# Patient Record
Sex: FEMALE | Race: WHITE | NOT HISPANIC OR LATINO | Employment: FULL TIME | URBAN - METROPOLITAN AREA
[De-identification: names, ages, dates, MRNs, and addresses within clinical notes are randomized per-mention and may not be internally consistent; named-entity substitution may affect disease eponyms.]

---

## 2017-01-22 NOTE — PROGRESS NOTES
HPI  Cinthya Waite is a 60-year-old woman with a history of stage IIA, T2 N0 M0, ER positive, IL positive and HER-2 negative invasive ductal carcinoma of the left breast diagnosed in 2007.   She is status post lumpectomy 08/05/2007 followed by 4 cycles of Taxotere and Cyclophosphamide. Patient completed radiation therapy in 01/2008. She initiated Tamoxifen and was on it for one year and when she became post-menopausal, she was changed to Arimidex in 08/2008There is a small seroma at the site of the lumpectomy just above the incision.  It is also possible there could be some fat necrosis there.  This area of firmness has been stable.  She has been followed by our clinic every 6 months and we will alternate followup with Michelle Cole.  She has been followed by yearly mammographic surveillance.         URI No gynecological symptoms.    INTERVAL HISTORY:  Cinthya Waite returns to clinic for routine followup.  She has been doing quite well.  She has no pain, fatigue, depression or anxiety.        REVIEW OF SYSTEMS:  She has a 10-point review of systems that is entirely negative.      She did have an allergy with some hives on her breasts about 2 months ago, but this was not accompanied by any upper respiratory or eye redness and may be food related.  This has not repeated, but I discussed with her that it may be food related and she could see an allergist if it recurs.  She is off hormonal therapy, having completed 7-1/2 years.  By her choice, she wanted to stop because she felt she was intolerant of aromatase inhibitors with joint discomfort and also had some fatigue on tamoxifen.      PHYSICAL EXAMINATION:   VITAL SIGNS:  Blood pressure 137/66, temperature 98.1, pulse 85, respirations 18, O2 sat 97% on room air, height 1.6 meters and weight 79.9 kg, which is up by a kilogram since over the past year.   GENERAL:  Cinthya appeared generally well.     HEENT:  She has no alopecia.  Examination of the oropharynx is  without lesions.   LYMPH:  There is no palpable cervical, supraclavicular, subclavicular or axillary lymphadenopathy.   BREASTS:  The right breast has an inverted nipple, no masses in the right breast.  The left breast has an inverted nipple, no masses in the left breast.  There is a left breast incision located about 5 fingerbreadths above the nipple-areolar complex at the 12 o'clock position, which is well-healed without erythema or masses.  There is some slight firmness above the incision, which may be related to a seroma which is, if anything, less than on previous visit.  The left nipple is inverted.  The left axillary incision is well-healed without erythema or masses.   LUNGS:  Clear to percussion and auscultation.   HEART:  There is a regular rate and rhythm, S1, S2.   ABDOMEN:  Soft and nontender, without hepatosplenomegaly.   EXTREMITIES:  Without edema.   PSYCHIATRIC:  Mood and affect were normal.      LABORATORY DATA:  The CBC and CMP were within normal limits except for a nonfasting glucose of 129.  DEXA scan is pending.  Mammogram from 08/25/2016 was BI-RADS 2, scattered fibroglandular densities and breast-conserving changes on the left.      ASSESSMENT AND PLAN:   1.  Cinthya Waite is a 61-year-old woman with a history of a stage IIA, T2 N0 M0, ER positive, SD positive and HER-2 negative invasive ductal carcinoma of the left breast diagnosed in 2007.  There is a small seroma at the site of the lumpectomy just above the incision.  There also could be some fat necrosis of the site.  This area of firmness has been stable; if anything, is a little bit decreased compared to previous visit.  Our plan is to continue every 6-month followup alternating with SOLANGE Saleem.  We will continue with surveillance screening mammography only.  She is due for a mammogram this August.  She did have a mammogram in 08/2016, which was BI-RADS 2.   2.  Hormonal therapy. Cinthya decided that she wanted to discontinue  hormonal therapy last February.  She had completed 7-1/2 years of hormonal therapy.  She was concerned about joint discomfort related to the aromatase inhibitors and did have some vaginal bleeding on tamoxifen, which resolved.  She did have some loss of energy on tamoxifen.  She decided that overall she felt better off tamoxifen and wanted to discontinue hormonal therapy.  Given that she had had 7-1/2 years, I think this is a reasonable personal decision on her part.   3.  Osteopenia.  DEXA scan is pending.  Last DEXA scan showed a most valid negative T-score of -1.5.  We will contact her with results.   4.  Continue with calcium and vitamin D.   5.  We do recommend 150 minutes of exercise a week and she has been exercising very vigorously.  We commended her on these efforts.   6.  Followup.  She will see Michelle Cole in followup in 6 months and me in a year.  Mammogram will be in 08/2017. Follow up with Michelle Cole July 25 with tomomammogram, CBC, CMP.         Thank you for allowing us to continue to participate in Cinthya Waite's care.   Sincerely,    Johnathan Herrera M.D.      Division of Hematology, Oncology, Transplant   Department of Medicine   [employer]   [phone]   ADDENDUM:  New diagnosis of osteoporosis.  We will call.  Prolia could be an option.     I spent 20 minutes with the patient more than 50% of which was in counseling and coordination of care.

## 2017-01-24 ENCOUNTER — ONCOLOGY VISIT (OUTPATIENT)
Dept: ONCOLOGY | Facility: CLINIC | Age: 62
End: 2017-01-24
Attending: INTERNAL MEDICINE
Payer: COMMERCIAL

## 2017-01-24 ENCOUNTER — APPOINTMENT (OUTPATIENT)
Dept: LAB | Facility: CLINIC | Age: 62
End: 2017-01-24
Attending: INTERNAL MEDICINE
Payer: COMMERCIAL

## 2017-01-24 ENCOUNTER — RECORDS - HEALTHEAST (OUTPATIENT)
Dept: ADMINISTRATIVE | Facility: OTHER | Age: 62
End: 2017-01-24

## 2017-01-24 VITALS
OXYGEN SATURATION: 97 % | DIASTOLIC BLOOD PRESSURE: 66 MMHG | HEIGHT: 64 IN | WEIGHT: 176.15 LBS | RESPIRATION RATE: 18 BRPM | SYSTOLIC BLOOD PRESSURE: 137 MMHG | HEART RATE: 85 BPM | BODY MASS INDEX: 30.07 KG/M2 | TEMPERATURE: 98.1 F

## 2017-01-24 DIAGNOSIS — C50.912 INFILTRATING DUCTAL CARCINOMA OF BREAST, LEFT (H): ICD-10-CM

## 2017-01-24 LAB
ALBUMIN SERPL-MCNC: 3.5 G/DL (ref 3.4–5)
ALP SERPL-CCNC: 83 U/L (ref 40–150)
ALT SERPL W P-5'-P-CCNC: 23 U/L (ref 0–50)
ANION GAP SERPL CALCULATED.3IONS-SCNC: 6 MMOL/L (ref 3–14)
AST SERPL W P-5'-P-CCNC: 16 U/L (ref 0–45)
BASOPHILS # BLD AUTO: 0.1 10E9/L (ref 0–0.2)
BASOPHILS NFR BLD AUTO: 0.7 %
BILIRUB SERPL-MCNC: 1 MG/DL (ref 0.2–1.3)
BUN SERPL-MCNC: 9 MG/DL (ref 7–30)
CALCIUM SERPL-MCNC: 8.7 MG/DL (ref 8.5–10.1)
CHLORIDE SERPL-SCNC: 108 MMOL/L (ref 94–109)
CO2 SERPL-SCNC: 28 MMOL/L (ref 20–32)
CREAT SERPL-MCNC: 0.82 MG/DL (ref 0.52–1.04)
DIFFERENTIAL METHOD BLD: NORMAL
EOSINOPHIL # BLD AUTO: 0.3 10E9/L (ref 0–0.7)
EOSINOPHIL NFR BLD AUTO: 4 %
ERYTHROCYTE [DISTWIDTH] IN BLOOD BY AUTOMATED COUNT: 12.5 % (ref 10–15)
GFR SERPL CREATININE-BSD FRML MDRD: 70 ML/MIN/1.7M2
GLUCOSE SERPL-MCNC: 129 MG/DL (ref 70–99)
HCT VFR BLD AUTO: 41.8 % (ref 35–47)
HGB BLD-MCNC: 14.5 G/DL (ref 11.7–15.7)
IMM GRANULOCYTES # BLD: 0 10E9/L (ref 0–0.4)
IMM GRANULOCYTES NFR BLD: 0.2 %
LYMPHOCYTES # BLD AUTO: 1 10E9/L (ref 0.8–5.3)
LYMPHOCYTES NFR BLD AUTO: 12.9 %
MCH RBC QN AUTO: 31.7 PG (ref 26.5–33)
MCHC RBC AUTO-ENTMCNC: 34.7 G/DL (ref 31.5–36.5)
MCV RBC AUTO: 91 FL (ref 78–100)
MONOCYTES # BLD AUTO: 0.6 10E9/L (ref 0–1.3)
MONOCYTES NFR BLD AUTO: 7.8 %
NEUTROPHILS # BLD AUTO: 6 10E9/L (ref 1.6–8.3)
NEUTROPHILS NFR BLD AUTO: 74.4 %
NRBC # BLD AUTO: 0 10*3/UL
NRBC BLD AUTO-RTO: 0 /100
PLATELET # BLD AUTO: 191 10E9/L (ref 150–450)
POTASSIUM SERPL-SCNC: 3.4 MMOL/L (ref 3.4–5.3)
PROT SERPL-MCNC: 6.6 G/DL (ref 6.8–8.8)
RBC # BLD AUTO: 4.58 10E12/L (ref 3.8–5.2)
SODIUM SERPL-SCNC: 141 MMOL/L (ref 133–144)
WBC # BLD AUTO: 8 10E9/L (ref 4–11)

## 2017-01-24 PROCEDURE — 80053 COMPREHEN METABOLIC PANEL: CPT | Performed by: PHYSICIAN ASSISTANT

## 2017-01-24 PROCEDURE — 99212 OFFICE O/P EST SF 10 MIN: CPT

## 2017-01-24 PROCEDURE — 36415 COLL VENOUS BLD VENIPUNCTURE: CPT

## 2017-01-24 PROCEDURE — 99213 OFFICE O/P EST LOW 20 MIN: CPT | Mod: ZP | Performed by: INTERNAL MEDICINE

## 2017-01-24 PROCEDURE — 85025 COMPLETE CBC W/AUTO DIFF WBC: CPT | Performed by: PHYSICIAN ASSISTANT

## 2017-01-24 ASSESSMENT — ENCOUNTER SYMPTOMS
POOR WOUND HEALING: 0
SKIN CHANGES: 0
NAIL CHANGES: 0

## 2017-01-24 ASSESSMENT — PAIN SCALES - GENERAL: PAINLEVEL: NO PAIN (0)

## 2017-01-24 NOTE — Clinical Note
1/24/2017       RE: Cinthya Waite  [address]  [place] [address]     Dear Colleague,    Thank you for referring your patient, Cinthya Waite, to the [facility]. Please see a copy of my visit note below.    HPI  Cinthya Waite is a 60-year-old woman with a history of stage IIA, T2 N0 M0, ER positive, [place] positive and HER-2 negative invasive ductal carcinoma of the left breast diagnosed in 2007.   She is status post lumpectomy 08/05/2007 followed by 4 cycles of Taxotere and Cyclophosphamide. Patient completed radiation therapy in 01/2008. She initiated Tamoxifen and was on it for one year and when she became post-menopausal, she was changed to Arimidex in 08/2008There is a small seroma at the site of the lumpectomy just above the incision.  It is also possible there could be some fat necrosis there.  This area of firmness has been stable.  She has been followed by our clinic every 6 months and we will alternate followup with Michelle Cole.  She has been followed by yearly mammographic surveillance.         URI No gynecological symptoms.    INTERVAL HISTORY:  Cinthya Waite returns to clinic for routine followup.  She has been doing quite well.  She has no pain, fatigue, depression or anxiety.        REVIEW OF SYSTEMS:  She has a 10-point review of systems that is entirely negative.      She did have an allergy with some hives on her breasts about 2 months ago, but this was not accompanied by any upper respiratory or eye redness and may be food related.  This has not repeated, but I discussed with her that it may be food related and she could see an allergist if it recurs.  She is off hormonal therapy, having completed 7-1/2 years.  By her choice, she wanted to stop because she felt she was intolerant of aromatase inhibitors with joint discomfort and also had some fatigue on tamoxifen.      PHYSICAL EXAMINATION:   VITAL SIGNS:  Blood pressure 137/66, temperature  98.1, pulse 85, respirations 18, O2 sat 97% on room air, height 1.6 meters and weight 79.9 kg, which is up by a kilogram since over the past year.   GENERAL:  Cinthya appeared generally well.     HEENT:  She has no alopecia.  Examination of the oropharynx is without lesions.   LYMPH:  There is no palpable cervical, supraclavicular, subclavicular or axillary lymphadenopathy.   BREASTS:  The right breast has an inverted nipple, no masses in the right breast.  The left breast has an inverted nipple, no masses in the left breast.  There is a left breast incision located about 5 fingerbreadths above the nipple-areolar complex at the 12 o'clock position, which is well-healed without erythema or masses.  There is some slight firmness above the incision, which may be related to a seroma which is, if anything, less than on previous visit.  The left nipple is inverted.  The left axillary incision is well-healed without erythema or masses.   LUNGS:  Clear to percussion and auscultation.   HEART:  There is a regular rate and rhythm, S1, S2.   ABDOMEN:  Soft and nontender, without hepatosplenomegaly.   EXTREMITIES:  Without edema.   PSYCHIATRIC:  Mood and affect were normal.      LABORATORY DATA:  The CBC and CMP were within normal limits except for a nonfasting glucose of 129.  DEXA scan is pending.  Mammogram from 08/25/2016 was BI-RADS 2, scattered fibroglandular densities and breast-conserving changes on the left.      ASSESSMENT AND PLAN:   1.  Cinthya Waite is a 61-year-old woman with a history of a stage IIA, T2 N0 M0, ER positive, [place] positive and HER-2 negative invasive ductal carcinoma of the left breast diagnosed in 2007.  There is a small seroma at the site of the lumpectomy just above the incision.  There also could be some fat necrosis of the site.  This area of firmness has been stable; if anything, is a little bit decreased compared to previous visit.  Our plan is to continue every 6-month followup alternating  with SOLANGE Saleem.  We will continue with surveillance screening mammography only.  She is due for a mammogram this August.  She did have a mammogram in 08/2016, which was BI-RADS 2.   2.  Hormonal therapy. Cinthya decided that she wanted to discontinue hormonal therapy last February.  She had completed 7-1/2 years of hormonal therapy.  She was concerned about joint discomfort related to the aromatase inhibitors and did have some vaginal bleeding on tamoxifen, which resolved.  She did have some loss of energy on tamoxifen.  She decided that overall she felt better off tamoxifen and wanted to discontinue hormonal therapy.  Given that she had had 7-1/2 years, I think this is a reasonable personal decision on her part.   3.  Osteopenia.  DEXA scan is pending.  Last DEXA scan showed a most valid negative T-score of -1.5.  We will contact her with results.   4.  Continue with calcium and vitamin D.   5.  We do recommend 150 minutes of exercise a week and she has been exercising very vigorously.  We commended her on these efforts.   6.  Followup.  She will see Michelle Cole in followup in 6 months and me in a year.  Mammogram will be in 08/2017. Follow up with Michelle Cole July 25 with tomomammogram, CBC, CMP.         Thank you for allowing us to continue to participate in Cinthya Waite's care.   Sincerely,    Johnathan Herrera M.D.      Division of Hematology, Oncology, Transplant   Department of Medicine   [employer] [facility] [employer]   [phone]   ADDENDUM:  New diagnosis of osteoporosis.  We will call.  Prolia could be an option.     I spent 20 minutes with the patient more than 50% of which was in counseling and coordination of care.     Again, thank you for allowing me to participate in the care of your patient.      Sincerely,    Johnathan Herrera MD

## 2017-01-24 NOTE — NURSING NOTE
"Cinthya Waite is a 61 year old female who presents for:  Chief Complaint   Patient presents with     Blood Draw     labs drawn from right arm and vitals taken by [facility]      Oncology Clinic Visit     Patient is seeing provider for post op        Initial Vitals:  /66 mmHg  Pulse 85  Temp(Src) 98.1  F (36.7  C) (Oral)  Resp 18  Ht 1.626 m (5' 4\")  Wt 79.9 kg (176 lb 2.4 oz)  BMI 30.22 kg/m2  SpO2 97%  LMP 06/07/2007 Estimated body mass index is 30.22 kg/(m^2) as calculated from the following:    Height as of this encounter: 1.626 m (5' 4\").    Weight as of this encounter: 79.9 kg (176 lb 2.4 oz).. Body surface area is 1.90 meters squared. BP completed using cuff size: NA (Not Taken)  No Pain (0) Patient's last menstrual period was 06/07/2007. Allergies and medications reviewed.     Medications: Medication refills not needed today.  Pharmacy name entered into [employer]:    [id number] [place], [place] - [address] [facility] - [place], [place] - [address]    Comments: Patient is not in any pain today. Vitals taken in lab today    6 minutes for nursing intake (face to face time)   Megha Mcpherson LPN        "

## 2017-01-24 NOTE — NURSING NOTE
Chief Complaint   Patient presents with     Blood Draw     labs drawn from right arm and vitals taken by [facility]      Regular size cuff was used for blood pressure check.   Carmen Rosario CMA January 24, 2017

## 2017-02-16 ENCOUNTER — TELEPHONE (OUTPATIENT)
Dept: ONCOLOGY | Facility: CLINIC | Age: 62
End: 2017-02-16

## 2017-02-16 NOTE — TELEPHONE ENCOUNTER
Patient called to discuss DEXA scan result and Dr Herrera's recommendation to start denosumab. Patient requesting result to be released to [facility] and was done as requested to take to patient's PMD to discuss and manage. Will send message to Dr Herrera for update of patient's request.  Answered all patient's questions and verbalized understanding. Kaleigh Mccray RN, BSN.

## 2017-02-19 ENCOUNTER — COMMUNICATION - HEALTHEAST (OUTPATIENT)
Dept: INTERNAL MEDICINE | Facility: CLINIC | Age: 62
End: 2017-02-19

## 2017-02-19 DIAGNOSIS — E03.9 UNSPECIFIED HYPOTHYROIDISM: ICD-10-CM

## 2017-02-24 ENCOUNTER — COMMUNICATION - HEALTHEAST (OUTPATIENT)
Dept: INTERNAL MEDICINE | Facility: CLINIC | Age: 62
End: 2017-02-24

## 2017-02-24 DIAGNOSIS — I10 UNSPECIFIED ESSENTIAL HYPERTENSION: ICD-10-CM

## 2017-03-14 ENCOUNTER — OFFICE VISIT - HEALTHEAST (OUTPATIENT)
Dept: INTERNAL MEDICINE | Facility: CLINIC | Age: 62
End: 2017-03-14

## 2017-03-14 ENCOUNTER — AMBULATORY - HEALTHEAST (OUTPATIENT)
Dept: INTERNAL MEDICINE | Facility: CLINIC | Age: 62
End: 2017-03-14

## 2017-03-14 ENCOUNTER — COMMUNICATION - HEALTHEAST (OUTPATIENT)
Dept: INTERNAL MEDICINE | Facility: CLINIC | Age: 62
End: 2017-03-14

## 2017-03-14 DIAGNOSIS — Z51.81 MEDICATION MONITORING ENCOUNTER: ICD-10-CM

## 2017-03-14 DIAGNOSIS — M81.8 OSTEOPOROSIS DUE TO AROMATASE INHIBITOR: ICD-10-CM

## 2017-03-14 DIAGNOSIS — C50.919 BREAST CANCER (H): ICD-10-CM

## 2017-03-14 DIAGNOSIS — E78.00 HYPERCHOLESTEROLEMIA: ICD-10-CM

## 2017-03-14 DIAGNOSIS — E03.9 ACQUIRED HYPOTHYROIDISM: ICD-10-CM

## 2017-03-14 DIAGNOSIS — T38.6X5A OSTEOPOROSIS DUE TO AROMATASE INHIBITOR: ICD-10-CM

## 2017-03-14 DIAGNOSIS — Z00.00 PREVENTATIVE HEALTH CARE: ICD-10-CM

## 2017-03-14 DIAGNOSIS — I10 ESSENTIAL HYPERTENSION WITH GOAL BLOOD PRESSURE LESS THAN 140/90: ICD-10-CM

## 2017-03-14 DIAGNOSIS — M81.0 OSTEOPOROSIS: ICD-10-CM

## 2017-03-14 DIAGNOSIS — E03.9 UNSPECIFIED HYPOTHYROIDISM: ICD-10-CM

## 2017-03-14 LAB
CHOLEST SERPL-MCNC: 233 MG/DL
FASTING STATUS PATIENT QL REPORTED: YES
HDLC SERPL-MCNC: 54 MG/DL
LDLC SERPL CALC-MCNC: 142 MG/DL
TRIGL SERPL-MCNC: 187 MG/DL

## 2017-03-14 ASSESSMENT — MIFFLIN-ST. JEOR: SCORE: 1318.9

## 2017-03-15 ENCOUNTER — COMMUNICATION - HEALTHEAST (OUTPATIENT)
Dept: INTERNAL MEDICINE | Facility: CLINIC | Age: 62
End: 2017-03-15

## 2017-03-16 ENCOUNTER — COMMUNICATION - HEALTHEAST (OUTPATIENT)
Dept: INTERNAL MEDICINE | Facility: CLINIC | Age: 62
End: 2017-03-16

## 2017-03-17 LAB
HPV INTERPRETATION - HISTORICAL: NORMAL
HPV INTERPRETER - HISTORICAL: NORMAL

## 2017-03-20 ENCOUNTER — AMBULATORY - HEALTHEAST (OUTPATIENT)
Dept: INTERNAL MEDICINE | Facility: CLINIC | Age: 62
End: 2017-03-20

## 2017-03-20 ENCOUNTER — COMMUNICATION - HEALTHEAST (OUTPATIENT)
Dept: INTERNAL MEDICINE | Facility: CLINIC | Age: 62
End: 2017-03-20

## 2017-03-20 ENCOUNTER — AMBULATORY - HEALTHEAST (OUTPATIENT)
Dept: LAB | Facility: CLINIC | Age: 62
End: 2017-03-20

## 2017-03-20 DIAGNOSIS — M81.0 OSTEOPOROSIS: ICD-10-CM

## 2017-03-20 DIAGNOSIS — E21.3 HYPERPARATHYROIDISM (H): ICD-10-CM

## 2017-03-22 LAB
BKR LAB AP ABNORMAL BLEEDING: NO
BKR LAB AP BIRTH CONTROL/HORMONES: NORMAL
BKR LAB AP CERVICAL APPEARANCE: NORMAL
BKR LAB AP GYN ADEQUACY: NORMAL
BKR LAB AP GYN INTERPRETATION: NORMAL
BKR LAB AP HPV REFLEX: NORMAL
BKR LAB AP LMP: NORMAL
BKR LAB AP PATIENT STATUS: NORMAL
BKR LAB AP PREVIOUS ABNORMAL: NORMAL
BKR LAB AP PREVIOUS NORMAL: 2014
HIGH RISK?: NO
PATH REPORT.COMMENTS IMP SPEC: NORMAL
RESULT FLAG (HE HISTORICAL CONVERSION): NORMAL

## 2017-03-23 ENCOUNTER — COMMUNICATION - HEALTHEAST (OUTPATIENT)
Dept: INTERNAL MEDICINE | Facility: CLINIC | Age: 62
End: 2017-03-23

## 2017-04-04 ENCOUNTER — TELEPHONE (OUTPATIENT)
Dept: ONCOLOGY | Facility: CLINIC | Age: 62
End: 2017-04-04

## 2017-04-04 NOTE — TELEPHONE ENCOUNTER
Patient called in reporting new swollen lymph under left armpit noticed a few days ago. Pt describes it as about a pea size with some bruising surrounding the area. No redness, warmth or tenderness noted to the area. Pt has had no noted fevers or recent illnesses. No swollen lymphs noted elsewhere. Per Kaleigh Mccray RN pt to see PA in clinic for follow up. Pt scheduled to see SOLANGE Saleem on 4/11/17.

## 2017-04-06 ENCOUNTER — ONCOLOGY VISIT (OUTPATIENT)
Dept: ONCOLOGY | Facility: CLINIC | Age: 62
End: 2017-04-06
Attending: PHYSICIAN ASSISTANT
Payer: COMMERCIAL

## 2017-04-06 VITALS
BODY MASS INDEX: 30.4 KG/M2 | WEIGHT: 177.1 LBS | OXYGEN SATURATION: 98 % | RESPIRATION RATE: 16 BRPM | DIASTOLIC BLOOD PRESSURE: 80 MMHG | SYSTOLIC BLOOD PRESSURE: 131 MMHG | TEMPERATURE: 97.6 F | HEART RATE: 92 BPM

## 2017-04-06 DIAGNOSIS — C50.912 INFILTRATING DUCTAL CARCINOMA OF BREAST, LEFT (H): Primary | ICD-10-CM

## 2017-04-06 DIAGNOSIS — R59.1 LYMPHADENOPATHY: ICD-10-CM

## 2017-04-06 DIAGNOSIS — R22.30 AXILLARY FULLNESS: ICD-10-CM

## 2017-04-06 DIAGNOSIS — R91.1 PULMONARY NODULE: ICD-10-CM

## 2017-04-06 PROCEDURE — 99214 OFFICE O/P EST MOD 30 MIN: CPT | Mod: ZP | Performed by: PHYSICIAN ASSISTANT

## 2017-04-06 PROCEDURE — 99212 OFFICE O/P EST SF 10 MIN: CPT

## 2017-04-06 ASSESSMENT — PAIN SCALES - GENERAL: PAINLEVEL: NO PAIN (0)

## 2017-04-06 NOTE — PROGRESS NOTES
DIAGNOSIS:  Stage IIB (T2N0M0), ER positive, MI positive, HER-2/[place] negative, left breast carcinoma. Status post lumpectomy on 08/13/2007 followed by 4 cycles of Taxotere and Cyclophosphamide. Patient completed radiation therapy with all treatment completed 01/2008. She was initiated on Tamoxifen but then when she became post-menopausal, changed to Arimidex in 10/2008 and took until 08/2013. She was restarted on the Tamoxifen in 08/2013 with a plan to take for 4 years to complete a total of 10 years of a hormonal therapy. She stopped the Tamoxifen in 09/2015 with an episode of vaginal bleeding. She had a D&C in October 2015 with benign pathology. Decision was made to not restart the Tamoxifen.    INTERVAL HISTORY:  Ms. Waite comes into the clinic today for an unscheduled visit.  About a week ago, she noted a swollen area in her left armpit region.  She states that it felt achy with any kind of movement, such as raising her arm.  She denies any trauma or injury to the area.  She denies any change in her breast exam.  She is planning on seeing an endocrinologist on Monday for apparent hyperparathyroidism.  She is otherwise eating and drinking okay.  She denies any chest pain, shortness of breath, cough, nasal congestion, nausea, vomiting, abdominal pain, new bone pains or change in her headaches.     MEDICATIONS:   Current Outpatient Prescriptions   Medication Sig Dispense Refill     aspirin 81 MG tablet Take 81 mg by mouth daily       VITAMIN D, CHOLECALCIFEROL, PO Take 5,000 Units by mouth daily       buPROPion (BUPROBAN) 150 MG 12 hr tablet Take 150 mg by mouth 2 times daily.        busPIRone (BUSPAR) 10 MG tablet Take 10 mg by mouth daily.       atorvastatin (LIPITOR) 40 MG tablet Take 40 mg by mouth daily.        hydrochlorothiazide (HYDRODIURIL) 25 MG tablet Take 1 tablet by mouth daily.       levothyroxine (SYNTHROID, LEVOTHROID) 125 MCG tablet 165 mcg daily            ALLERGIES:    Allergies   Allergen  Reactions     Diclofenac      Elevation of LFTs.       PHYSICAL EXAMINATION:  Vitals: /80  Pulse 92  Temp 97.6  F (36.4  C) (Oral)  Resp 16  Wt 80.3 kg (177 lb 1.6 oz)  LMP 06/07/2007  SpO2 98%  BMI 30.4 kg/m2  GENERAL:  A pleasant person in no acute distress.   HEENT:  Sclerae are nonicteric.     NECK:  Supple.   LYMPH NODES:  1 cm left supraclavicular lymph node.  No peripheral lymphadenopathy noted in the axillary, right supraclavicular, or cervical regions.   LUNGS:  Clear to auscultation bilaterally.   HEART:  Regular rate and rhythm, with no murmur appreciated.   ABDOMEN:  Bowel sounds are active.  Soft and nontender.  No hepatosplenomegaly or other masses appreciated.  LOWER EXTREMITIES:  Without pitting edema to the knees bilaterally.   NEUROLOGICAL:  Alert/orientated/able to answer all questions.  CN grossly intact.  BREAST: Bilateral nipple inversion. stable. Right breast, no dominant masses. Left breast notable for well healed surgical incision in upper inner quadrant. There is a palpable firmness in the area of the incision (known seroma) about 2 cm. No other masses.  Left axillary fullness, no adenopathy.  Proximal LUE lymphedema.       IMPRESSION/PLAN:   1.  Stage IIB (T2 N0 M0) left breast cancer, ER/MI-positive and HER2/[place]-negative.  Ms. Waite came into the clinic today for an unscheduled visit secondary to left axillary edema.  I am unable to palpate any axillary adenopathy, but she does have fullness in the area, and I question whether this could be lymphedema.  On exam, she does have a 1 cm left supraclavicular lymph node that is palpable.  No current upper respiratory infection symptoms.  We will plan further evaluation with a CT scan of the chest and neck.  Ms. Waite is in agreement with this plan, and we will obtain the CT scans as soon as possible and contact her with those results.  We will plan to keep her already scheduled appointment, and she will see Dr. Herrera with  her bilateral mammogram in August.   2.  Left axillary fullness/left supraclavicular lymph node.  Please see above.  We will plan further evaluation with a CT scan of the neck and chest.  We will plan to contact the patient with those results.      If there are no interval concerns, the patient will follow up in August.

## 2017-04-06 NOTE — NURSING NOTE
"Cinthya Waite is a 61 year old female who presents for:  Chief Complaint   Patient presents with     Oncology Clinic Visit     Breast Cancer        Initial Vitals:  /80  Pulse 92  Temp 97.6  F (36.4  C) (Oral)  Resp 16  Wt 80.3 kg (177 lb 1.6 oz)  LMP 06/07/2007  SpO2 98%  BMI 30.4 kg/m2 Estimated body mass index is 30.4 kg/(m^2) as calculated from the following:    Height as of 1/24/17: 1.626 m (5' 4\").    Weight as of this encounter: 80.3 kg (177 lb 1.6 oz).. Body surface area is 1.9 meters squared. BP completed using cuff size: regular  No Pain (0) Patient's last menstrual period was 06/07/2007. Allergies and medications reviewed.     Medications: Medication refills not needed today.  Pharmacy name entered into [employer]:    [id number] [place], [place] - [address] [facility] - [place], [place] - [address]    Comments: Patient is not having any pain today.     6 minutes for nursing intake (face to face time)   Delilah Mcgowan CMA       "

## 2017-04-06 NOTE — MR AVS SNAPSHOT
After Visit Summary   4/6/2017    Cinthya Waite    MRN: [id number]           Patient Information     Date Of Birth          1955        Visit Information        Provider Department      4/6/2017 9:30 AM Michelle Cole PA-C [facility]        Today's Diagnoses     Infiltrating ductal carcinoma of breast, left (H)    -  1    Lymphadenopathy        Axillary fullness           Follow-ups after your visit        Your next 10 appointments already scheduled     Apr 06, 2017 12:40 PM CDT   (Arrive by 12:25 PM)   CT SOFT TISSUE NECK W CONTRAST with UCCT1   [facility] [place] [facility] ([facility])    [address]  1st Floor  [place] [facility] [address] [phone]           Please bring any scans or X-rays taken at other hospitals, if similar tests were done. Also bring a list of your medicines, including vitamins, minerals and over-the-counter drugs. It is safest to leave personal items at home.  Be sure to tell your doctor:   If you have any allergies.   If there s any chance you are pregnant.   If you are breastfeeding.   If you have any special needs.  You will have contrast for this exam. To prepare:   Do not eat or drink for 2 hours before your exam. If you need to take medicine, you may take it with small sips of water. (We may ask you to take liquid medicine as well.)   The day before your exam, drink extra fluids at least six 8-ounce glasses (unless your doctor tells you to restrict your fluids).  Patients over 70 or patients with diabetes or kidney problems:   If you haven t had a blood test (creatinine test) within the last 30 days, go to your clinic or Diagnostic Imaging Department for this test.  If you have diabetes:   If your kidney function is normal, continue taking your metformin (Avandamet, Glucophage, Glucovance, Metaglip) on the day of your exam.   If your kidney function is abnormal, wait 48 hours before restarting this  medicine.  Please wear loose clothing, such as a sweat suit or jogging clothes. Avoid snaps, zippers and other metal. We may ask you to undress and put on a hospital gown.  If you have any questions, please call the Imaging Department where you will have your exam.            Apr 06, 2017  1:00 PM CDT   (Arrive by 12:45 PM)   CT CHEST W CONTRAST with UCCT1   [facility] CT ([facility] [place])    [address]  1st Floor  [place] [facility] [address] [phone]           Please bring any scans or X-rays taken at other hospitals, if similar tests were done. Also bring a list of your medicines, including vitamins, minerals and over-the-counter drugs. It is safest to leave personal items at home.  Be sure to tell your doctor:   If you have any allergies.   If there s any chance you are pregnant.   If you are breastfeeding.   If you have any special needs.  You will have contrast for this exam. To prepare:   Do not eat or drink for 2 hours before your exam. If you need to take medicine, you may take it with small sips of water. (We may ask you to take liquid medicine as well.)   The day before your exam, drink extra fluids at least six 8-ounce glasses (unless your doctor tells you to restrict your fluids).  Patients over 70 or patients with diabetes or kidney problems:   If you haven t had a blood test (creatinine test) within the last 30 days, go to your clinic or Diagnostic Imaging Department for this test.  If you have diabetes:   If your kidney function is normal, continue taking your metformin (Avandamet, Glucophage, Glucovance, Metaglip) on the day of your exam.   If your kidney function is abnormal, wait 48 hours before restarting this medicine.  Please wear loose clothing, such as a sweat suit or jogging clothes. Avoid snaps, zippers and other metal. We may ask you to undress and put on a hospital gown.  If you have any questions, please call the Imaging Department where you  will have your exam.            Aug 01, 2017 10:00 AM CDT   [facility] Lab Draw with  [facility] LAB DRAW   [facility] ([place] [facility])    [address] [place] [address] [phone]            Aug 01, 2017 10:30 AM CDT   (Arrive by 10:15 AM)   MA DIAGNOSTIC DIGITAL BILATERAL with [email or web address]   [facility] [place] [facility] ([place] [facility])    [address] [place] [address] [phone]           Do not use any powder, lotion or deodorant under your arms or on your breast. If you do, we will ask you to remove it before your exam.  Wear comfortable, two-piece clothing.  If you have any allergies, tell your care team.  Bring any previous mammograms from other facilities or have them mailed to the breast center.            Aug 01, 2017 11:00 AM CDT   (Arrive by 10:45 AM)   Return Visit with Michelle Cole PA-C   [facility] ([place] [facility])    [address] [place] [address] [phone]              Future tests that were ordered for you today     Open Future Orders        Priority Expected Expires Ordered    CT Soft tissue neck w contrast* ASAP  4/6/2018 4/6/2017    CT Chest w contrast ASAP  4/6/2018 4/6/2017            Who to contact     If you have questions or need follow up information about today's clinic visit or your schedule please contact [facility] directly at [phone].  Normal or non-critical lab and imaging results will be communicated to you by MyChart, letter or phone within 4 business days after the clinic has received the results. If you do not hear from us within 7 days, please contact the clinic through MyChart or phone. If you have a critical or abnormal lab result, we will notify you by phone as soon as possible.  Submit refill requests through [employer] or call your pharmacy and they will forward  the refill request to us. Please allow 3 business days for your refill to be completed.          Additional Information About Your Visit        [employer] Information     [employer] gives you secure access to your electronic health record. If you see a primary care provider, you can also send messages to your care team and make appointments. If you have questions, please call your primary care clinic.  If you do not have a primary care provider, please call [phone] and they will assist you.        Care EveryWhere ID     This is your Care EveryWhere ID. This could be used by other organizations to access your [place] medical records  [id number]        Your Vitals Were     Pulse Temperature Respirations Last Period Pulse Oximetry BMI (Body Mass Index)    92 97.6  F (36.4  C) (Oral) 16 06/07/2007 98% 30.4 kg/m2       Blood Pressure from Last 3 Encounters:   04/06/17 131/80   01/24/17 137/66   08/25/16 126/78    Weight from Last 3 Encounters:   04/06/17 80.3 kg (177 lb 1.6 oz)   01/24/17 79.9 kg (176 lb 2.4 oz)   08/25/16 78.3 kg (172 lb 11.2 oz)               Primary Care Provider Office Phone # Fax #    Heidi Valencia [phone]       [place] [id number]        Thank you!     Thank you for choosing [facility]  for your care. Our goal is always to provide you with excellent care. Hearing back from our patients is one way we can continue to improve our services. Please take a few minutes to complete the written survey that you may receive in the mail after your visit with us. Thank you!             Your Updated Medication List - Protect others around you: Learn how to safely use, store and throw away your medicines at www.disposemymeds.org.          This list is accurate as of: 4/6/17 10:35 AM.  Always use your most recent med list.                   Brand Name Dispense Instructions for use    aspirin 81 MG tablet      Take 81 mg by  mouth daily       BUPROBAN 150 MG 12 hr tablet   Generic drug:  buPROPion      Take 150 mg by mouth 2 times daily.       BUSPAR 10 MG tablet   Generic drug:  busPIRone      Take 10 mg by mouth daily.       hydrochlorothiazide 25 MG tablet    HYDRODIURIL     Take 1 tablet by mouth daily.       levothyroxine 125 MCG tablet    SYNTHROID/LEVOTHROID     165 mcg daily       LIPITOR 40 MG tablet   Generic drug:  atorvastatin      Take 40 mg by mouth daily.       VITAMIN D (CHOLECALCIFEROL) PO      Take 5,000 Units by mouth daily

## 2017-04-10 ENCOUNTER — HOSPITAL ENCOUNTER (OUTPATIENT)
Dept: ULTRASOUND IMAGING | Facility: CLINIC | Age: 62
Discharge: HOME | End: 2017-04-10

## 2017-04-10 ENCOUNTER — RECORDS - HEALTHEAST (OUTPATIENT)
Dept: ADMINISTRATIVE | Facility: OTHER | Age: 62
End: 2017-04-10

## 2017-04-10 DIAGNOSIS — E03.9 HYPOTHYROID: ICD-10-CM

## 2017-04-10 DIAGNOSIS — E21.0 PRIMARY HYPERPARATHYROIDISM (H): ICD-10-CM

## 2017-04-11 ENCOUNTER — COMMUNICATION - HEALTHEAST (OUTPATIENT)
Dept: INTERNAL MEDICINE | Facility: CLINIC | Age: 62
End: 2017-04-11

## 2017-04-11 ENCOUNTER — AMBULATORY - HEALTHEAST (OUTPATIENT)
Dept: LAB | Facility: CLINIC | Age: 62
End: 2017-04-11

## 2017-04-11 DIAGNOSIS — E21.0 HYPERPARATHYROIDISM, PRIMARY (H): ICD-10-CM

## 2017-04-12 ENCOUNTER — AMBULATORY - HEALTHEAST (OUTPATIENT)
Dept: LAB | Facility: CLINIC | Age: 62
End: 2017-04-12

## 2017-04-12 DIAGNOSIS — E21.0 HYPERPARATHYROIDISM, PRIMARY (H): ICD-10-CM

## 2017-04-14 ENCOUNTER — COMMUNICATION - HEALTHEAST (OUTPATIENT)
Dept: INTERNAL MEDICINE | Facility: CLINIC | Age: 62
End: 2017-04-14

## 2017-05-05 ENCOUNTER — COMMUNICATION - HEALTHEAST (OUTPATIENT)
Dept: INTERNAL MEDICINE | Facility: CLINIC | Age: 62
End: 2017-05-05

## 2017-05-25 ENCOUNTER — COMMUNICATION - HEALTHEAST (OUTPATIENT)
Dept: INTERNAL MEDICINE | Facility: CLINIC | Age: 62
End: 2017-05-25

## 2017-05-25 DIAGNOSIS — I10 UNSPECIFIED ESSENTIAL HYPERTENSION: ICD-10-CM

## 2017-06-30 ENCOUNTER — RECORDS - HEALTHEAST (OUTPATIENT)
Dept: ADMINISTRATIVE | Facility: OTHER | Age: 62
End: 2017-06-30

## 2017-07-06 ENCOUNTER — TELEPHONE (OUTPATIENT)
Dept: ONCOLOGY | Facility: CLINIC | Age: 62
End: 2017-07-06

## 2017-07-07 ENCOUNTER — TELEPHONE (OUTPATIENT)
Dept: ONCOLOGY | Facility: CLINIC | Age: 62
End: 2017-07-07

## 2017-07-08 NOTE — TELEPHONE ENCOUNTER
I called Cinthya and reassured her that there was a recommendation of no biopsy now, follow up CT at 3 months which could be spiral CT.  I discussed that while she's committed to a 2 year follow up with CT, the risk is low and we are optimistic that this is not lung or metastatic breast cancer.  Her smoking history is about 1 pk year in 1975.  All of her questions were answered.     Johnathan Herrera MD

## 2017-07-29 ENCOUNTER — HEALTH MAINTENANCE LETTER (OUTPATIENT)
Age: 62
End: 2017-07-29

## 2017-08-01 ENCOUNTER — RADIANT APPOINTMENT (OUTPATIENT)
Dept: MAMMOGRAPHY | Facility: CLINIC | Age: 62
End: 2017-08-01
Attending: INTERNAL MEDICINE

## 2017-08-01 ENCOUNTER — APPOINTMENT (OUTPATIENT)
Dept: LAB | Facility: CLINIC | Age: 62
End: 2017-08-01
Attending: INTERNAL MEDICINE
Payer: COMMERCIAL

## 2017-08-01 ENCOUNTER — ONCOLOGY VISIT (OUTPATIENT)
Dept: ONCOLOGY | Facility: CLINIC | Age: 62
End: 2017-08-01
Attending: INTERNAL MEDICINE
Payer: COMMERCIAL

## 2017-08-01 ENCOUNTER — RECORDS - HEALTHEAST (OUTPATIENT)
Dept: ADMINISTRATIVE | Facility: OTHER | Age: 62
End: 2017-08-01

## 2017-08-01 VITALS
BODY MASS INDEX: 30.3 KG/M2 | WEIGHT: 176.5 LBS | SYSTOLIC BLOOD PRESSURE: 129 MMHG | OXYGEN SATURATION: 98 % | TEMPERATURE: 97.5 F | HEART RATE: 79 BPM | RESPIRATION RATE: 16 BRPM | DIASTOLIC BLOOD PRESSURE: 84 MMHG

## 2017-08-01 DIAGNOSIS — C50.912 INFILTRATING DUCTAL CARCINOMA OF BREAST, LEFT (H): ICD-10-CM

## 2017-08-01 DIAGNOSIS — R91.1 PULMONARY NODULE: Primary | ICD-10-CM

## 2017-08-01 DIAGNOSIS — Z85.3 HX OF BREAST CANCER: ICD-10-CM

## 2017-08-01 LAB
ALBUMIN SERPL-MCNC: 3.7 G/DL (ref 3.4–5)
ALP SERPL-CCNC: 67 U/L (ref 40–150)
ALT SERPL W P-5'-P-CCNC: 27 U/L (ref 0–50)
ANION GAP SERPL CALCULATED.3IONS-SCNC: 8 MMOL/L (ref 3–14)
AST SERPL W P-5'-P-CCNC: 17 U/L (ref 0–45)
BASOPHILS # BLD AUTO: 0.1 10E9/L (ref 0–0.2)
BASOPHILS NFR BLD AUTO: 0.7 %
BILIRUB SERPL-MCNC: 0.8 MG/DL (ref 0.2–1.3)
BUN SERPL-MCNC: 14 MG/DL (ref 7–30)
CALCIUM SERPL-MCNC: 8.7 MG/DL (ref 8.5–10.1)
CHLORIDE SERPL-SCNC: 106 MMOL/L (ref 94–109)
CO2 SERPL-SCNC: 27 MMOL/L (ref 20–32)
CREAT SERPL-MCNC: 0.87 MG/DL (ref 0.52–1.04)
DIFFERENTIAL METHOD BLD: NORMAL
EOSINOPHIL # BLD AUTO: 0.4 10E9/L (ref 0–0.7)
EOSINOPHIL NFR BLD AUTO: 5.2 %
ERYTHROCYTE [DISTWIDTH] IN BLOOD BY AUTOMATED COUNT: 12.3 % (ref 10–15)
GFR SERPL CREATININE-BSD FRML MDRD: 66 ML/MIN/1.7M2
GLUCOSE SERPL-MCNC: 88 MG/DL (ref 70–99)
HCT VFR BLD AUTO: 45.5 % (ref 35–47)
HGB BLD-MCNC: 15.2 G/DL (ref 11.7–15.7)
IMM GRANULOCYTES # BLD: 0 10E9/L (ref 0–0.4)
IMM GRANULOCYTES NFR BLD: 0.4 %
LYMPHOCYTES # BLD AUTO: 1.6 10E9/L (ref 0.8–5.3)
LYMPHOCYTES NFR BLD AUTO: 23.5 %
MCH RBC QN AUTO: 30.8 PG (ref 26.5–33)
MCHC RBC AUTO-ENTMCNC: 33.4 G/DL (ref 31.5–36.5)
MCV RBC AUTO: 92 FL (ref 78–100)
MONOCYTES # BLD AUTO: 0.6 10E9/L (ref 0–1.3)
MONOCYTES NFR BLD AUTO: 9.3 %
NEUTROPHILS # BLD AUTO: 4.1 10E9/L (ref 1.6–8.3)
NEUTROPHILS NFR BLD AUTO: 60.9 %
NRBC # BLD AUTO: 0 10*3/UL
NRBC BLD AUTO-RTO: 0 /100
PLATELET # BLD AUTO: 253 10E9/L (ref 150–450)
POTASSIUM SERPL-SCNC: 3.7 MMOL/L (ref 3.4–5.3)
PROT SERPL-MCNC: 6.9 G/DL (ref 6.8–8.8)
RBC # BLD AUTO: 4.94 10E12/L (ref 3.8–5.2)
SODIUM SERPL-SCNC: 141 MMOL/L (ref 133–144)
WBC # BLD AUTO: 6.8 10E9/L (ref 4–11)

## 2017-08-01 PROCEDURE — 85025 COMPLETE CBC W/AUTO DIFF WBC: CPT | Performed by: INTERNAL MEDICINE

## 2017-08-01 PROCEDURE — 36415 COLL VENOUS BLD VENIPUNCTURE: CPT

## 2017-08-01 PROCEDURE — 99214 OFFICE O/P EST MOD 30 MIN: CPT | Mod: ZP | Performed by: PHYSICIAN ASSISTANT

## 2017-08-01 PROCEDURE — 99212 OFFICE O/P EST SF 10 MIN: CPT | Mod: ZF

## 2017-08-01 PROCEDURE — 80053 COMPREHEN METABOLIC PANEL: CPT | Performed by: INTERNAL MEDICINE

## 2017-08-01 ASSESSMENT — PAIN SCALES - GENERAL: PAINLEVEL: NO PAIN (0)

## 2017-08-01 NOTE — NURSING NOTE
Chief Complaint   Patient presents with     Blood Draw     Labs drawn by RN from VPT. VS taken.      Labs drawn from  AC.  Serina Friend RN

## 2017-08-01 NOTE — MR AVS SNAPSHOT
After Visit Summary   8/1/2017    Cinthya Waite    MRN: [id number]           Patient Information     Date Of Birth          1955        Visit Information        Provider Department      8/1/2017 11:00 AM Michelle Cole PA-C [facility]        Today's Diagnoses     Pulmonary nodule    -  1    Infiltrating ductal carcinoma of breast, left (H)           Follow-ups after your visit        Your next 10 appointments already scheduled     Sep 28, 2017  7:40 AM CDT   (Arrive by 7:25 AM)   CT CHEST W CONTRAST with UCCT1   [facility] [place] [facility] ([facility])    [address] [address] [phone]           Please bring any scans or X-rays taken at other hospitals, if similar tests were done. Also bring a list of your medicines, including vitamins, minerals and over-the-counter drugs. It is safest to leave personal items at home.  Be sure to tell your doctor:   If you have any allergies.   If there s any chance you are pregnant.   If you are breastfeeding.   If you have any special needs.  You will have contrast for this exam. To prepare:   Do not eat or drink for 2 hours before your exam. If you need to take medicine, you may take it with small sips of water. (We may ask you to take liquid medicine as well.)   The day before your exam, drink extra fluids at least six 8-ounce glasses (unless your doctor tells you to restrict your fluids).  Patients over 70 or patients with diabetes or kidney problems:   If you haven t had a blood test (creatinine test) within the last 30 days, go to your clinic or Diagnostic Imaging Department for this test.  If you have diabetes:   If your kidney function is normal, continue taking your metformin (Avandamet, Glucophage, Glucovance, Metaglip) on the day of your exam.   If your kidney function is abnormal, wait 48 hours before restarting this medicine.  Please wear loose clothing,  such as a sweat suit or jogging clothes. Avoid snaps, zippers and other metal. We may ask you to undress and put on a hospital gown.  If you have any questions, please call the Imaging Department where you will have your exam.            Sep 28, 2017 10:00 AM CDT   (Arrive by 9:45 AM)   Return Visit with Johnathan Herrera MD   [facility] ([facility] [place])    [address]  2nd [address]  [facility] [address] [phone]              Future tests that were ordered for you today     Open Future Orders        Priority Expected Expires Ordered    CT Chest w contrast Routine 8/1/2017 8/1/2018 8/1/2017            Who to contact     If you have questions or need follow up information about today's clinic visit or your schedule please contact [facility] directly at [phone].  Normal or non-critical lab and imaging results will be communicated to you by MyChart, letter or phone within 4 business days after the clinic has received the results. If you do not hear from us within 7 days, please contact the clinic through [employer] or phone. If you have a critical or abnormal lab result, we will notify you by phone as soon as possible.  Submit refill requests through [employer] or call your pharmacy and they will forward the refill request to us. Please allow 3 business days for your refill to be completed.          Additional Information About Your Visit        [employer] Information     [employer] gives you secure access to your electronic health record. If you see a primary care provider, you can also send messages to your care team and make appointments. If you have questions, please call your primary care clinic.  If you do not have a primary care provider, please call [phone] and they will assist you.        Care EveryWhere ID     This is your Care EveryWhere ID. This could be used by other organizations to access your [facility]  records  [id number]        Your Vitals Were     Pulse Temperature Respirations Last Period Pulse Oximetry BMI (Body Mass Index)    79 97.5  F (36.4  C) (Oral) 16 06/07/2007 98% 30.3 kg/m2       Blood Pressure from Last 3 Encounters:   08/01/17 129/84   04/06/17 131/80   01/24/17 137/66    Weight from Last 3 Encounters:   08/01/17 80.1 kg (176 lb 8 oz)   04/06/17 80.3 kg (177 lb 1.6 oz)   01/24/17 79.9 kg (176 lb 2.4 oz)              We Performed the Following     CBC with platelets differential     Comprehensive metabolic panel        Primary Care Provider Office Phone # Fax #    Heidi Valencia [phone]       [facility] [address] [place] [id number]        Equal Access to Services     YASEMIN [facility] CenterFELIBERTO : Hadii gurdeep Taveras, waaxda luqadaha, qaybta kaalmada adejean pierre, veda valentin . So [facility] [phone].    ATENCIÓN: Si habla español, tiene a ames disposición servicios gratuitos de asistencia lingüística. Llame al [phone].    We comply with applicable federal civil rights laws and [place] laws. We do not discriminate on the basis of race, color, national origin, age, disability sex, sexual orientation or gender identity.            Thank you!     Thank you for choosing [facility]  for your care. Our goal is always to provide you with excellent care. Hearing back from our patients is one way we can continue to improve our services. Please take a few minutes to complete the written survey that you may receive in the mail after your visit with us. Thank you!             Your Updated Medication List - Protect others around you: Learn how to safely use, store and throw away your medicines at www.disposemymeds.org.          This list is accurate as of: 8/1/17 11:59 PM.  Always use your most recent med list.                   Brand Name Dispense Instructions for use Diagnosis    aspirin 81 MG tablet      Take 81 mg  by mouth daily        BUPROBAN 150 MG 12 hr tablet   Generic drug:  buPROPion      Take 150 mg by mouth 2 times daily.        BUSPAR 10 MG tablet   Generic drug:  busPIRone      Take 10 mg by mouth daily.        hydrochlorothiazide 25 MG tablet    HYDRODIURIL     Take 1 tablet by mouth daily.        levothyroxine 125 MCG tablet    SYNTHROID/LEVOTHROID     165 mcg daily        LIPITOR 40 MG tablet   Generic drug:  atorvastatin      Take 40 mg by mouth daily.        VITAMIN D (CHOLECALCIFEROL) PO      Take 5,000 Units by mouth daily

## 2017-08-01 NOTE — LETTER
8/1/2017      RE: Cinthya Waite  [address]  [place] [address]       DIAGNOSIS:  Stage IIB (T2N0M0), ER positive, CO positive, HER-2/[place] negative, left breast carcinoma. Status post lumpectomy on 08/13/2007 followed by 4 cycles of Taxotere and Cyclophosphamide. Patient completed radiation therapy with all treatment completed 01/2008. She was initiated on Tamoxifen but then when she became post-menopausal, changed to Arimidex in 10/2008 and took until 08/2013. She was restarted on the Tamoxifen in 08/2013 with a plan to take for 4 years to complete a total of 10 years of a hormonal therapy. She stopped the Tamoxifen in 09/2015 with an episode of vaginal bleeding. She had a D&C in October 2015 with benign pathology. Decision was made to not restart the Tamoxifen.    INTERVAL HISTORY:  Cinthya returns to the clinic today for followup of her breast carcinoma.  She continues to do well at this time.  She has an intermittent dry cough.  She is eating and drinking well.  She is trying to lose weight.  She is exercising 120 minutes of cardiovascular exercise per week.  She denies any chest pain, shortness of breath, nausea, vomiting, abdominal pain, new bone pains, change in vision or headaches.     MEDICATIONS:   Current Outpatient Prescriptions   Medication Sig Dispense Refill     aspirin 81 MG tablet Take 81 mg by mouth daily       VITAMIN D, CHOLECALCIFEROL, PO Take 5,000 Units by mouth daily       buPROPion (BUPROBAN) 150 MG 12 hr tablet Take 150 mg by mouth 2 times daily.        busPIRone (BUSPAR) 10 MG tablet Take 10 mg by mouth daily.       atorvastatin (LIPITOR) 40 MG tablet Take 40 mg by mouth daily.        hydrochlorothiazide (HYDRODIURIL) 25 MG tablet Take 1 tablet by mouth daily.       levothyroxine (SYNTHROID, LEVOTHROID) 125 MCG tablet 165 mcg daily            ALLERGIES:    Allergies   Allergen Reactions     Diclofenac Unknown     Elevation of LFTs.  Elevation of LFTs.       PHYSICAL  EXAMINATION:  Vitals: /84 (BP Location: Right arm, Patient Position: Chair, Cuff Size: Adult Regular)  Pulse 79  Temp 97.5  F (36.4  C) (Oral)  Resp 16  Wt 80.1 kg (176 lb 8 oz)  LMP 06/07/2007  SpO2 98%  BMI 30.3 kg/m2  GENERAL:  A pleasant person in no acute distress.   HEENT:  Sclerae are nonicteric.    NECK:  Supple.   LYMPH NODES:  No peripheral lymphadenopathy noted in the axillary, supraclavicular, or cervical regions.   LUNGS:  Clear to auscultation bilaterally.   HEART:  Regular rate and rhythm, with no murmur appreciated.   ABDOMEN:  Bowel sounds are active.  Soft and nontender.  No hepatosplenomegaly or other masses appreciated.  LOWER EXTREMITIES:  Without pitting edema to the knees bilaterally.   NEUROLOGICAL:  Alert/orientated/able to answer all questions.  CN grossly intact.  BREAST: Bilateral nipple inversion, stable. Right breast, no dominant masses. Left breast notable for well healed surgical incision in upper inner quadrant. There is a palpable firmness in the area of the incision (known seroma) about 2 cm. No other masses.     LAB:      8/1/2017 10:32   Sodium 141   Potassium 3.7   Chloride 106   Carbon Dioxide 27   Urea Nitrogen 14   Creatinine 0.87   GFR Estimate 66   GFR Estimate If Black 79   Calcium 8.7   Anion Gap 8   Albumin 3.7   Protein Total 6.9   Bilirubin Total 0.8   Alkaline Phosphatase 67   ALT 27   AST 17   Glucose 88   WBC 6.8   Hemoglobin 15.2   Hematocrit 45.5   Platelet Count 253   RBC Count 4.94   MCV 92   MCH 30.8   MCHC 33.4   RDW 12.3   Diff Method Automated Method   % Neutrophils 60.9   % Lymphocytes 23.5   % Monocytes 9.3   % Eosinophils 5.2   % Basophils 0.7   % Immature Granulocytes 0.4   Nucleated RBCs 0   Absolute Neutrophil 4.1   Absolute Lymphocytes 1.6   Absolute Monocytes 0.6   Absolute Eosinophils 0.4   Absolute Basophils 0.1   Abs Immature Granulocytes 0.0   Absolute Nucleated RBC 0.0       RADIOLOGY:  Bilateral mammogram today: preliminary, no  cancer.    IMPRESSION/PLAN:   1.  Stage IIB (T2 N0 M0) left breast carcinoma, ER/MT-positive, HER2/[place]-negative.  Cinthya continues to do well at this time.  She is not have any signs or symptoms that would suggest recurrence of her breast carcinoma.  Her bilateral mammogram results are pending, but preliminary shows no cancer.  We will plan to review those results and release them to the patient.  I will plan for short interval followup due to the pulmonary nodule; see below.   2.  Pulmonary nodule.  CT scan of the chest done in 04/2017 showed a patchy nodular consolidation in the right lobe.  Recommendations were made for a followup CT.  Followup CT scan on 06/30 had shown a slightly increased area in the right lobe.  I did have the CT reviewed at the [facility], and recommendations were for a followup CT scan in 3 months.  I will plan for the patient to have the CT scan in the end of September or early October and will see Dr. Herrera at that same visit.   3.  Bone health.  She had a bone density in 01/2017 which was consistent with a T-score of -2.6 and osteoporosis.  She did see her endocrinologist locally and there is questionable parathyroid dysfunction.  She continues to follow with Endocrinology and made the decision to start the Prolia, which she is now getting every 6 months locally through their office.      If there are no interval concerns, the patient will follow up as stated above.     ADDENDUM:    Examination: MA DIAGNOSTIC BILATERAL with tomosynthesis, 8/1/2017  10:57 AM      Comparison: 8/25/2016, 8/20/2015, 8/18/2014, 8/15/2013, 9/17/2012     History: History of left breast cancer status post conservation  therapy.     Breast Density: Scattered fibroglandular densities     Findings: No significant change. Posttreatment changes on the left.         Impression: BI-RADS CATEGORY: 2 - Benign Finding(s).     Recommended Follow-up: Annual Mammography.         Michelle Cole PA-C

## 2017-08-01 NOTE — PROGRESS NOTES
DIAGNOSIS:  Stage IIB (T2N0M0), ER positive, AR positive, HER-2/[place] negative, left breast carcinoma. Status post lumpectomy on 08/13/2007 followed by 4 cycles of Taxotere and Cyclophosphamide. Patient completed radiation therapy with all treatment completed 01/2008. She was initiated on Tamoxifen but then when she became post-menopausal, changed to Arimidex in 10/2008 and took until 08/2013. She was restarted on the Tamoxifen in 08/2013 with a plan to take for 4 years to complete a total of 10 years of a hormonal therapy. She stopped the Tamoxifen in 09/2015 with an episode of vaginal bleeding. She had a D&C in October 2015 with benign pathology. Decision was made to not restart the Tamoxifen.    INTERVAL HISTORY:  Cinthya returns to the clinic today for followup of her breast carcinoma.  She continues to do well at this time.  She has an intermittent dry cough.  She is eating and drinking well.  She is trying to lose weight.  She is exercising 120 minutes of cardiovascular exercise per week.  She denies any chest pain, shortness of breath, nausea, vomiting, abdominal pain, new bone pains, change in vision or headaches.     MEDICATIONS:   Current Outpatient Prescriptions   Medication Sig Dispense Refill     aspirin 81 MG tablet Take 81 mg by mouth daily       VITAMIN D, CHOLECALCIFEROL, PO Take 5,000 Units by mouth daily       buPROPion (BUPROBAN) 150 MG 12 hr tablet Take 150 mg by mouth 2 times daily.        busPIRone (BUSPAR) 10 MG tablet Take 10 mg by mouth daily.       atorvastatin (LIPITOR) 40 MG tablet Take 40 mg by mouth daily.        hydrochlorothiazide (HYDRODIURIL) 25 MG tablet Take 1 tablet by mouth daily.       levothyroxine (SYNTHROID, LEVOTHROID) 125 MCG tablet 165 mcg daily            ALLERGIES:    Allergies   Allergen Reactions     Diclofenac Unknown     Elevation of LFTs.  Elevation of LFTs.       PHYSICAL EXAMINATION:  Vitals: /84 (BP Location: Right arm, Patient Position: Chair, Cuff Size:  Adult Regular)  Pulse 79  Temp 97.5  F (36.4  C) (Oral)  Resp 16  Wt 80.1 kg (176 lb 8 oz)  LMP 06/07/2007  SpO2 98%  BMI 30.3 kg/m2  GENERAL:  A pleasant person in no acute distress.   HEENT:  Sclerae are nonicteric.    NECK:  Supple.   LYMPH NODES:  No peripheral lymphadenopathy noted in the axillary, supraclavicular, or cervical regions.   LUNGS:  Clear to auscultation bilaterally.   HEART:  Regular rate and rhythm, with no murmur appreciated.   ABDOMEN:  Bowel sounds are active.  Soft and nontender.  No hepatosplenomegaly or other masses appreciated.  LOWER EXTREMITIES:  Without pitting edema to the knees bilaterally.   NEUROLOGICAL:  Alert/orientated/able to answer all questions.  CN grossly intact.  BREAST: Bilateral nipple inversion, stable. Right breast, no dominant masses. Left breast notable for well healed surgical incision in upper inner quadrant. There is a palpable firmness in the area of the incision (known seroma) about 2 cm. No other masses.     LAB:      8/1/2017 10:32   Sodium 141   Potassium 3.7   Chloride 106   Carbon Dioxide 27   Urea Nitrogen 14   Creatinine 0.87   GFR Estimate 66   GFR Estimate If Black 79   Calcium 8.7   Anion Gap 8   Albumin 3.7   Protein Total 6.9   Bilirubin Total 0.8   Alkaline Phosphatase 67   ALT 27   AST 17   Glucose 88   WBC 6.8   Hemoglobin 15.2   Hematocrit 45.5   Platelet Count 253   RBC Count 4.94   MCV 92   MCH 30.8   MCHC 33.4   RDW 12.3   Diff Method Automated Method   % Neutrophils 60.9   % Lymphocytes 23.5   % Monocytes 9.3   % Eosinophils 5.2   % Basophils 0.7   % Immature Granulocytes 0.4   Nucleated RBCs 0   Absolute Neutrophil 4.1   Absolute Lymphocytes 1.6   Absolute Monocytes 0.6   Absolute Eosinophils 0.4   Absolute Basophils 0.1   Abs Immature Granulocytes 0.0   Absolute Nucleated RBC 0.0       RADIOLOGY:  Bilateral mammogram today: preliminary, no cancer.    IMPRESSION/PLAN:   1.  Stage IIB (T2 N0 M0) left breast carcinoma, ER/NC-positive,  HER2/[place]-negative.  Cinthya continues to do well at this time.  She is not have any signs or symptoms that would suggest recurrence of her breast carcinoma.  Her bilateral mammogram results are pending, but preliminary shows no cancer.  We will plan to review those results and release them to the patient.  I will plan for short interval followup due to the pulmonary nodule; see below.   2.  Pulmonary nodule.  CT scan of the chest done in 04/2017 showed a patchy nodular consolidation in the right lobe.  Recommendations were made for a followup CT.  Followup CT scan on 06/30 had shown a slightly increased area in the right lobe.  I did have the CT reviewed at [facility], and recommendations were for a followup CT scan in 3 months.  I will plan for the patient to have the CT scan in the end of September or early October and will see Dr. Herrera at that same visit.   3.  Bone health.  She had a bone density in 01/2017 which was consistent with a T-score of -2.6 and osteoporosis.  She did see her endocrinologist locally and there is questionable parathyroid dysfunction.  She continues to follow with Endocrinology and made the decision to start the Prolia, which she is now getting every 6 months locally through their office.      If there are no interval concerns, the patient will follow up as stated above.     ADDENDUM:    Examination: MA DIAGNOSTIC BILATERAL with tomosynthesis, 8/1/2017  10:57 AM      Comparison: 8/25/2016, 8/20/2015, 8/18/2014, 8/15/2013, 9/17/2012     History: History of left breast cancer status post conservation  therapy.     Breast Density: Scattered fibroglandular densities     Findings: No significant change. Posttreatment changes on the left.         Impression: BI-RADS CATEGORY: 2 - Benign Finding(s).     Recommended Follow-up: Annual Mammography.

## 2017-08-01 NOTE — NURSING NOTE
"Oncology Rooming Note    August 1, 2017 11:25 AM   Cinthya Waite is a 61 year old female who presents for:    Chief Complaint   Patient presents with     Blood Draw     Labs drawn by RN from VPT. VS taken.      Oncology Clinic Visit     Return-Breast Ca     Initial Vitals: /84 (BP Location: Right arm, Patient Position: Chair, Cuff Size: Adult Regular)  Pulse 79  Temp 97.5  F (36.4  C) (Oral)  Resp 16  Wt 80.1 kg (176 lb 8 oz)  LMP 06/07/2007  SpO2 98%  BMI 30.3 kg/m2 Estimated body mass index is 30.3 kg/(m^2) as calculated from the following:    Height as of 1/24/17: 1.626 m (5' 4\").    Weight as of this encounter: 80.1 kg (176 lb 8 oz). Body surface area is 1.9 meters squared.  No Pain (0) Comment: Data Unavailable   Patient's last menstrual period was 06/07/2007.  Allergies reviewed: Yes  Medications reviewed: Yes    Medications: Medication refills not needed today.  Pharmacy name entered into [employer]:    [id number] [place], [place] - [address] [facility] - [place], [place] - [address]    Clinical concerns: no provider was NOT notified.    6 minutes for nursing intake (face to face time)     Alie Fox LPN            "

## 2017-08-09 ENCOUNTER — COMMUNICATION - HEALTHEAST (OUTPATIENT)
Dept: INTERNAL MEDICINE | Facility: CLINIC | Age: 62
End: 2017-08-09

## 2017-09-18 ENCOUNTER — COMMUNICATION - HEALTHEAST (OUTPATIENT)
Dept: INTERNAL MEDICINE | Facility: CLINIC | Age: 62
End: 2017-09-18

## 2017-09-18 DIAGNOSIS — E03.9 UNSPECIFIED HYPOTHYROIDISM: ICD-10-CM

## 2017-09-27 ENCOUNTER — COMMUNICATION - HEALTHEAST (OUTPATIENT)
Dept: INTERNAL MEDICINE | Facility: CLINIC | Age: 62
End: 2017-09-27

## 2017-09-27 DIAGNOSIS — F34.1 DYSTHYMIA: ICD-10-CM

## 2017-10-01 NOTE — PROGRESS NOTES
BOOKER Waite is a 60-year-old woman with a history of stage IIA, T2 N0 M0, ER positive, NV positive and HER-2 negative invasive ductal carcinoma of the left breast diagnosed in 2007.   She is status post lumpectomy 08/05/2007 followed by 4 cycles of Taxotere and Cyclophosphamide. Patient completed radiation therapy in 01/2008. She initiated Tamoxifen and was on it for one year and when she became post-menopausal, she was changed to Arimidex in 08/2008There is a small seroma at the site of the lumpectomy just above the incision.  It is also possible there could be some fat necrosis there.  This area of firmness has been stable.  She has been followed by our clinic every 6 months and we will alternate followup with Michelle Cole.  She has been followed by yearly mammographic surveillance.            URI No gynecological       INTERVAL HISTORY:  Cinthya returns to clinic.  She has been feeling entirely well.  She has no pain, no fatigue, no depression and no anxiety.      REVIEW OF SYSTEMS:  A 10-point review of systems is entirely negative.  She rode her bike to clinic and has been exercising more than 150 minutes per week and I commended her on her exercise.  She has been eating a healthful diet.  She consumes very little alcohol.  She is taking calcium and vitamin D. although I did recommend that she cut back the vitamin D from 5000 units a day to 2000.  She has been started on Prolia for osteoporosis and she is getting that every 6 months.      PHYSICAL EXAMINATION:   VITAL SIGNS:  Blood pressure 120/79, temperature 97, pulse 98, respirations 16, O2 sat 97% on room air, height 1.6 meters and weight 80 kg.   GENERAL:  Cinthya appeared generally well.  She has no alopecia.   HEENT:  No lesions in the oropharynx.   LYMPH:  There is no palpable cervical, supraclavicular, subclavicular or axillary lymphadenopathy.   BREASTS:  Examination of the right breast is without masses.  There was some fungal infection at  the 5 o'clock position under the right breast and I recommended nystatin powder.  Examination of the left breast revealed a well-healed incision at the 11 o'clock position, 4 fingerbreadths from the nipple-areolar complex, without erythema or masses or seroma.  No masses in the left breast.  The left axillary incision is well-healed without erythema or masses.  Both nipples were inverted.   LUNGS:  Clear to percussion and auscultation.   HEART:  There is a regular rate and rhythm, S1, S2.   ABDOMEN:  Soft and nontender, without hepatosplenomegaly.   EXTREMITIES:  Without edema.   PSYCHIATRIC:  Mood and affect were normal.        LABORATORY DATA:  CBC and CMP were not obtained today, but were within normal limits on 08/01/2017.  She did have a CT scan of the chest with contrast which showed decreased size of the ground-glass nodular opacities in the right lower lobe favoring resolving infectious or inflammatory etiology.  A followup CT scan in 6 months was recommended.  Moderate hiatal hernia and unchanged soft tissue focus of the left lumpectomy site.  Continued yearly mammography was recommended.      ASSESSMENT AND PLAN:     1.  Cinthya Waite is a 61-year-old woman with a history of a stage IIA, T2 N0 M0, ER positive, [place] positive and HER-2 negative invasive ductal carcinoma of the left breast diagnosed in 2007.  There is a small seroma at the lumpectomy site just above the incision, which is nearly completely resolved.  Our plan is to continue with every 6-month followup alternating with an HALEY.  We will continue with surveillance screening mammography only and she is due for a mammogram in a year.   2.  Hormonal therapy.  Cinthya is now off hormonal therapy after having completed 7-1/2 years.   3.  Small ground-glass nodular opacities in the right lower lobe, favoring resolving infection.  It is recommended to obtain a followup chest CT scan at 6 months.   4.  Continue with calcium and vitamin D, but decreased  the vitamin D dose to 2000 international units daily.   5.  Continue with 150 minutes of exercise a week.   6.  Continue with a diet that is low in saturated fat.   7.  Followup.  She will see an HALEY in followup in 6 months with a chest CT and me in 6 months with a chest CT and a mammogram. Follow up with HALEY 4-5 with chest CT 4-4 and with me 10-4-18 with tomomammogram with CBC, CMP both visits.        Thank you for allowing us to continue to participate in Cinthya Ravindra's care.     Sincerely,    Johnathan Herrera M.D.      Division of Hematology, Oncology, Transplant   Department of Medicine   [employer] [facility] [employer]   [phone]           I spent 20 minutes with the patient more than 50% of which was in counseling and coordination of care.

## 2017-10-03 ENCOUNTER — COMMUNICATION - HEALTHEAST (OUTPATIENT)
Dept: INTERNAL MEDICINE | Facility: CLINIC | Age: 62
End: 2017-10-03

## 2017-10-03 DIAGNOSIS — I10 HYPERTENSION: ICD-10-CM

## 2017-10-05 ENCOUNTER — ONCOLOGY VISIT (OUTPATIENT)
Dept: ONCOLOGY | Facility: CLINIC | Age: 62
End: 2017-10-05
Attending: INTERNAL MEDICINE
Payer: COMMERCIAL

## 2017-10-05 ENCOUNTER — RECORDS - HEALTHEAST (OUTPATIENT)
Dept: ADMINISTRATIVE | Facility: OTHER | Age: 62
End: 2017-10-05

## 2017-10-05 VITALS
TEMPERATURE: 97 F | DIASTOLIC BLOOD PRESSURE: 79 MMHG | HEART RATE: 98 BPM | RESPIRATION RATE: 16 BRPM | BODY MASS INDEX: 29.94 KG/M2 | WEIGHT: 175.4 LBS | SYSTOLIC BLOOD PRESSURE: 120 MMHG | HEIGHT: 64 IN | OXYGEN SATURATION: 97 %

## 2017-10-05 DIAGNOSIS — C50.212 MALIGNANT NEOPLASM OF UPPER-INNER QUADRANT OF LEFT BREAST IN FEMALE, ESTROGEN RECEPTOR POSITIVE (H): Primary | ICD-10-CM

## 2017-10-05 DIAGNOSIS — Z17.0 MALIGNANT NEOPLASM OF UPPER-INNER QUADRANT OF LEFT BREAST IN FEMALE, ESTROGEN RECEPTOR POSITIVE (H): Primary | ICD-10-CM

## 2017-10-05 PROCEDURE — 99213 OFFICE O/P EST LOW 20 MIN: CPT | Mod: ZP | Performed by: INTERNAL MEDICINE

## 2017-10-05 PROCEDURE — 99212 OFFICE O/P EST SF 10 MIN: CPT | Mod: ZF

## 2017-10-05 ASSESSMENT — PAIN SCALES - GENERAL: PAINLEVEL: NO PAIN (0)

## 2017-10-05 NOTE — NURSING NOTE
"Oncology Rooming Note    October 5, 2017 12:16 PM   Cinthya Waite is a 61 year old female who presents for:    Chief Complaint   Patient presents with     Oncology Clinic Visit     Return: Breast CA     Initial Vitals: /79  Pulse 98  Temp 97  F (36.1  C) (Oral)  Resp 16  Ht 1.626 m (5' 4.02\")  Wt 79.6 kg (175 lb 6.4 oz)  LMP 06/07/2007  SpO2 97%  BMI 30.09 kg/m2 Estimated body mass index is 30.09 kg/(m^2) as calculated from the following:    Height as of this encounter: 1.626 m (5' 4.02\").    Weight as of this encounter: 79.6 kg (175 lb 6.4 oz). Body surface area is 1.9 meters squared.  No Pain (0) Comment: Data Unavailable   Patient's last menstrual period was 06/07/2007.  Allergies reviewed: Yes  Medications reviewed: Yes    Medications: Medication refills not needed today.  Pharmacy name entered into [employer]:    [id number] [place], [place] - [address] [facility] - [place], [place] - [address] [facility]    Clinical concerns: no new concerns     6 minutes for nursing intake (face to face time)     Kat Harvey CMA    Injectable Influenza Immunization Documentation    1.  Has the patient received the information for the injectable influenza vaccine? YES     2. Is the patient 6 months of age or older? YES     3. Does the patient have any of the following contraindications?         Severe allergy to eggs?  No     Severe allergic reaction to previous influenza vaccines?  No   Severe allergy to latex?  No       History of Guillain-[place] syndrome?  No     Currently have a temperature greater than 100.4F?  No        4.  Severely egg allergic patients should have flu vaccine eligibility assessed by an MD, RN, or pharmacist, and those who received flu vaccine should be observed for 15 min by an MD, RN, Pharmacist, Medical Technician, or member of clinic staff.\": NO    5. Latex-allergic patients should be given latex-free influenza vaccine No. Please reference the " Vaccine latex table to determine if your clinic s product is latex-containing.

## 2017-10-05 NOTE — LETTER
10/5/2017       RE: Cinthya Waite  [address]  [place] [address]     Dear Colleague,    Thank you for referring your patient, Cinthya Waite, to the [facility]. Please see a copy of my visit note below.    HPI  Cinthya Waite is a 60-year-old woman with a history of stage IIA, T2 N0 M0, ER positive, [place] positive and HER-2 negative invasive ductal carcinoma of the left breast diagnosed in 2007.   She is status post lumpectomy 08/05/2007 followed by 4 cycles of Taxotere and Cyclophosphamide. Patient completed radiation therapy in 01/2008. She initiated Tamoxifen and was on it for one year and when she became post-menopausal, she was changed to Arimidex in 08/2008There is a small seroma at the site of the lumpectomy just above the incision.  It is also possible there could be some fat necrosis there.  This area of firmness has been stable.  She has been followed by our clinic every 6 months and we will alternate followup with Michelle Cole.  She has been followed by yearly mammographic surveillance.            URI No gynecological       INTERVAL HISTORY:  Cinthya returns to clinic.  She has been feeling entirely well.  She has no pain, no fatigue, no depression and no anxiety.      REVIEW OF SYSTEMS:  A 10-point review of systems is entirely negative.  She rode her bike to clinic and has been exercising more than 150 minutes per week and I commended her on her exercise.  She has been eating a healthful diet.  She consumes very little alcohol.  She is taking calcium and vitamin D. although I did recommend that she cut back the vitamin D from 5000 units a day to 2000.  She has been started on Prolia for osteoporosis and she is getting that every 6 months.      PHYSICAL EXAMINATION:   VITAL SIGNS:  Blood pressure 120/79, temperature 97, pulse 98, respirations 16, O2 sat 97% on room air, height 1.6 meters and weight 80 kg.   GENERAL:  Cinthya appeared generally well.  She  has no alopecia.   HEENT:  No lesions in the oropharynx.   LYMPH:  There is no palpable cervical, supraclavicular, subclavicular or axillary lymphadenopathy.   BREASTS:  Examination of the right breast is without masses.  There was some fungal infection at the 5 o'clock position under the right breast and I recommended nystatin powder.  Examination of the left breast revealed a well-healed incision at the 11 o'clock position, 4 fingerbreadths from the nipple-areolar complex, without erythema or masses or seroma.  No masses in the left breast.  The left axillary incision is well-healed without erythema or masses.  Both nipples were inverted.   LUNGS:  Clear to percussion and auscultation.   HEART:  There is a regular rate and rhythm, S1, S2.   ABDOMEN:  Soft and nontender, without hepatosplenomegaly.   EXTREMITIES:  Without edema.   PSYCHIATRIC:  Mood and affect were normal.        LABORATORY DATA:  CBC and CMP were not obtained today, but were within normal limits on 08/01/2017.  She did have a CT scan of the chest with contrast which showed decreased size of the ground-glass nodular opacities in the right lower lobe favoring resolving infectious or inflammatory etiology.  A followup CT scan in 6 months was recommended.  Moderate hiatal hernia and unchanged soft tissue focus of the left lumpectomy site.  Continued yearly mammography was recommended.      ASSESSMENT AND PLAN:     1.  Cinthya Waite is a 61-year-old woman with a history of a stage IIA, T2 N0 M0, ER positive, [place] positive and HER-2 negative invasive ductal carcinoma of the left breast diagnosed in 2007.  There is a small seroma at the lumpectomy site just above the incision, which is nearly completely resolved.  Our plan is to continue with every 6-month followup alternating with an HALEY.  We will continue with surveillance screening mammography only and she is due for a mammogram in a year.   2.  Hormonal therapy.  Cinthya is now off hormonal therapy  after having completed 7-1/2 years.   3.  Small ground-glass nodular opacities in the right lower lobe, favoring resolving infection.  It is recommended to obtain a followup chest CT scan at 6 months.   4.  Continue with calcium and vitamin D, but decreased the vitamin D dose to 2000 international units daily.   5.  Continue with 150 minutes of exercise a week.   6.  Continue with a diet that is low in saturated fat.   7.  Followup.  She will see an HALEY in followup in 6 months with a chest CT and me in 6 months with a chest CT and a mammogram. Follow up with HALEY 4-5 with chest CT 4-4 and with me 10-4-18 with tomomammogram with CBC, CMP both visits.        Thank you for allowing us to continue to participate in Cinthya Waite's care.     Sincerely,    Johnathan Herrera M.D.      Division of Hematology, Oncology, Transplant   Department of Medicine   [employer] [facility] [employer]   [phone]           I spent 20 minutes with the patient more than 50% of which was in counseling and coordination of care.     Again, thank you for allowing me to participate in the care of your patient.      Sincerely,    Johnathan Herrera MD

## 2017-10-05 NOTE — MR AVS SNAPSHOT
After Visit Summary   10/5/2017    Cinthya Waite    MRN: [id number]           Patient Information     Date Of Birth          1955        Visit Information        Provider Department      10/5/2017 12:30 PM Johnathan Herrera MD [facility]        Today's Diagnoses     Malignant neoplasm of upper-inner quadrant of left breast in female, estrogen receptor positive (H)    -  1       Follow-ups after your visit        Follow-up notes from your care team     Return in about 1 year (around 10/4/2018).      Who to contact     If you have questions or need follow up information about today's clinic visit or your schedule please contact [facility] directly at [phone].  Normal or non-critical lab and imaging results will be communicated to you by [employer], letter or phone within 4 business days after the clinic has received the results. If you do not hear from us within 7 days, please contact the clinic through [employer] or phone. If you have a critical or abnormal lab result, we will notify you by phone as soon as possible.  Submit refill requests through [employer] or call your pharmacy and they will forward the refill request to us. Please allow 3 business days for your refill to be completed.          Additional Information About Your Visit        MyChart Information     [employer] gives you secure access to your electronic health record. If you see a primary care provider, you can also send messages to your care team and make appointments. If you have questions, please call your primary care clinic.  If you do not have a primary care provider, please call [phone] and they will assist you.        Care EveryWhere ID     This is your Care EveryWhere ID. This could be used by other organizations to access your [place] medical records  [id number]        Your Vitals Were     Pulse Temperature Respirations Height Last Period Pulse Oximetry    98 97  F (36.1  " C) (Oral) 16 1.626 m (5' 4.02\") 06/07/2007 97%    BMI (Body Mass Index)                   30.09 kg/m2            Blood Pressure from Last 3 Encounters:   10/05/17 120/79   08/01/17 129/84   04/06/17 131/80    Weight from Last 3 Encounters:   10/05/17 79.6 kg (175 lb 6.4 oz)   08/01/17 80.1 kg (176 lb 8 oz)   04/06/17 80.3 kg (177 lb 1.6 oz)              Today, you had the following     No orders found for display       Primary Care Provider Office Phone # Fax #    Heidi Valencia [phone]       [facility] [address] [address]        Equal Access to Services     ABELINO REYEZ : Makeda Taveras, waaxlamont luqadaha, qaybta kaalmada belle, veda valentin . So [facility] [phone].    ATENCIÓN: Si habla español, tiene a ames disposición servicios gratuitos de asistencia lingüística. Trevon al [phone].    We comply with applicable federal civil rights laws and [place] laws. We do not discriminate on the basis of race, color, national origin, age, disability, sex, sexual orientation, or gender identity.            Thank you!     Thank you for choosing [facility]  for your care. Our goal is always to provide you with excellent care. Hearing back from our patients is one way we can continue to improve our services. Please take a few minutes to complete the written survey that you may receive in the mail after your visit with us. Thank you!             Your Updated Medication List - Protect others around you: Learn how to safely use, store and throw away your medicines at www.disposemymeds.org.          This list is accurate as of: 10/5/17 11:59 PM.  Always use your most recent med list.                   Brand Name Dispense Instructions for use Diagnosis    aspirin 81 MG tablet      Take 81 mg by mouth daily        BUPROBAN 150 MG 12 hr tablet   Generic drug:  buPROPion      Take 150 mg by mouth 2 times " daily.        BUSPAR 10 MG tablet   Generic drug:  busPIRone      Take 10 mg by mouth daily.        hydrochlorothiazide 25 MG tablet    HYDRODIURIL     Take 1 tablet by mouth daily.        levothyroxine 125 MCG tablet    SYNTHROID/LEVOTHROID     165 mcg daily        LIPITOR 40 MG tablet   Generic drug:  atorvastatin      Take 40 mg by mouth daily.        PROLIA SC           VITAMIN D (CHOLECALCIFEROL) PO      Take 5,000 Units by mouth daily

## 2017-10-07 PROBLEM — Z17.0 MALIGNANT NEOPLASM OF UPPER-INNER QUADRANT OF LEFT BREAST IN FEMALE, ESTROGEN RECEPTOR POSITIVE (H): Status: ACTIVE | Noted: 2017-10-07

## 2017-10-07 PROBLEM — C50.212 MALIGNANT NEOPLASM OF UPPER-INNER QUADRANT OF LEFT BREAST IN FEMALE, ESTROGEN RECEPTOR POSITIVE (H): Status: ACTIVE | Noted: 2017-10-07

## 2017-10-18 ENCOUNTER — COMMUNICATION - HEALTHEAST (OUTPATIENT)
Dept: LAB | Facility: CLINIC | Age: 62
End: 2017-10-18

## 2017-10-18 ENCOUNTER — AMBULATORY - HEALTHEAST (OUTPATIENT)
Dept: INTERNAL MEDICINE | Facility: CLINIC | Age: 62
End: 2017-10-18

## 2017-10-18 DIAGNOSIS — Z51.81 MEDICATION MONITORING ENCOUNTER: ICD-10-CM

## 2017-10-20 ENCOUNTER — AMBULATORY - HEALTHEAST (OUTPATIENT)
Dept: LAB | Facility: CLINIC | Age: 62
End: 2017-10-20

## 2017-10-20 DIAGNOSIS — Z23 NEED FOR INFLUENZA VACCINATION: ICD-10-CM

## 2017-10-20 DIAGNOSIS — Z51.81 MEDICATION MONITORING ENCOUNTER: ICD-10-CM

## 2017-10-20 LAB
CHOLEST SERPL-MCNC: 231 MG/DL
FASTING STATUS PATIENT QL REPORTED: YES
HDLC SERPL-MCNC: 56 MG/DL
LDLC SERPL CALC-MCNC: 146 MG/DL
TRIGL SERPL-MCNC: 143 MG/DL

## 2017-10-24 ENCOUNTER — COMMUNICATION - HEALTHEAST (OUTPATIENT)
Dept: INTERNAL MEDICINE | Facility: CLINIC | Age: 62
End: 2017-10-24

## 2017-10-27 ENCOUNTER — COMMUNICATION - HEALTHEAST (OUTPATIENT)
Dept: INTERNAL MEDICINE | Facility: CLINIC | Age: 62
End: 2017-10-27

## 2017-10-27 ENCOUNTER — AMBULATORY - HEALTHEAST (OUTPATIENT)
Dept: INTERNAL MEDICINE | Facility: CLINIC | Age: 62
End: 2017-10-27

## 2017-10-27 DIAGNOSIS — E03.9 ACQUIRED HYPOTHYROIDISM: ICD-10-CM

## 2018-01-16 DIAGNOSIS — R91.8 PULMONARY NODULES: Primary | ICD-10-CM

## 2018-02-28 ENCOUNTER — COMMUNICATION - HEALTHEAST (OUTPATIENT)
Dept: INTERNAL MEDICINE | Facility: CLINIC | Age: 63
End: 2018-02-28

## 2018-03-08 ENCOUNTER — COMMUNICATION - HEALTHEAST (OUTPATIENT)
Dept: INTERNAL MEDICINE | Facility: CLINIC | Age: 63
End: 2018-03-08

## 2018-03-08 DIAGNOSIS — I10 HYPERTENSION: ICD-10-CM

## 2018-03-09 ENCOUNTER — RECORDS - HEALTHEAST (OUTPATIENT)
Dept: ADMINISTRATIVE | Facility: OTHER | Age: 63
End: 2018-03-09

## 2018-03-10 ENCOUNTER — COMMUNICATION - HEALTHEAST (OUTPATIENT)
Dept: INTERNAL MEDICINE | Facility: CLINIC | Age: 63
End: 2018-03-10

## 2018-03-14 ENCOUNTER — OFFICE VISIT - HEALTHEAST (OUTPATIENT)
Dept: INTERNAL MEDICINE | Facility: CLINIC | Age: 63
End: 2018-03-14

## 2018-03-14 DIAGNOSIS — E03.9 ACQUIRED HYPOTHYROIDISM: ICD-10-CM

## 2018-03-14 DIAGNOSIS — D25.9 UTERINE FIBROID: ICD-10-CM

## 2018-03-14 DIAGNOSIS — E78.00 HYPERCHOLESTEROLEMIA: ICD-10-CM

## 2018-03-14 DIAGNOSIS — Z01.818 PREOP EXAM FOR INTERNAL MEDICINE: ICD-10-CM

## 2018-03-14 DIAGNOSIS — I44.7 LEFT BUNDLE BRANCH BLOCK (LBBB) ON ELECTROCARDIOGRAM: ICD-10-CM

## 2018-03-14 DIAGNOSIS — M81.0 OSTEOPOROSIS: ICD-10-CM

## 2018-03-14 LAB
ALBUMIN SERPL-MCNC: 3.9 G/DL (ref 3.5–5)
ALP SERPL-CCNC: 60 U/L (ref 45–120)
ALT SERPL W P-5'-P-CCNC: 25 U/L (ref 0–45)
ANION GAP SERPL CALCULATED.3IONS-SCNC: 12 MMOL/L (ref 5–18)
AST SERPL W P-5'-P-CCNC: 20 U/L (ref 0–40)
ATRIAL RATE - MUSE: 74 BPM
BILIRUB SERPL-MCNC: 1 MG/DL (ref 0–1)
BUN SERPL-MCNC: 13 MG/DL (ref 8–22)
CALCIUM SERPL-MCNC: 9.4 MG/DL (ref 8.5–10.5)
CALCIUM SERPL-MCNC: 9.5 MG/DL (ref 8.5–10.5)
CHLORIDE BLD-SCNC: 105 MMOL/L (ref 98–107)
CHOLEST SERPL-MCNC: 219 MG/DL
CO2 SERPL-SCNC: 26 MMOL/L (ref 22–31)
CREAT SERPL-MCNC: 0.81 MG/DL (ref 0.6–1.1)
CREAT SERPL-MCNC: 0.82 MG/DL (ref 0.6–1.1)
DIASTOLIC BLOOD PRESSURE - MUSE: NORMAL MMHG
ERYTHROCYTE [DISTWIDTH] IN BLOOD BY AUTOMATED COUNT: 11.5 % (ref 11–14.5)
FASTING STATUS PATIENT QL REPORTED: ABNORMAL
GFR SERPL CREATININE-BSD FRML MDRD: >60 ML/MIN/1.73M2
GFR SERPL CREATININE-BSD FRML MDRD: >60 ML/MIN/1.73M2
GLUCOSE BLD-MCNC: 90 MG/DL (ref 70–125)
HCT VFR BLD AUTO: 45 % (ref 35–47)
HDLC SERPL-MCNC: 53 MG/DL
HGB BLD-MCNC: 15.4 G/DL (ref 12–16)
INTERPRETATION ECG - MUSE: NORMAL
LDLC SERPL CALC-MCNC: 135 MG/DL
MCH RBC QN AUTO: 31.6 PG (ref 27–34)
MCHC RBC AUTO-ENTMCNC: 34.3 G/DL (ref 32–36)
MCV RBC AUTO: 92 FL (ref 80–100)
P AXIS - MUSE: 48 DEGREES
PHOSPHATE SERPL-MCNC: 2.6 MG/DL (ref 2.5–4.5)
PLATELET # BLD AUTO: 202 THOU/UL (ref 140–440)
PMV BLD AUTO: 8.2 FL (ref 7–10)
POTASSIUM BLD-SCNC: 4.3 MMOL/L (ref 3.5–5)
PR INTERVAL - MUSE: 126 MS
PROT SERPL-MCNC: 6.6 G/DL (ref 6–8)
PTH-INTACT SERPL-MCNC: 77 PG/ML (ref 10–86)
QRS DURATION - MUSE: 132 MS
QT - MUSE: 432 MS
QTC - MUSE: 479 MS
R AXIS - MUSE: -17 DEGREES
RBC # BLD AUTO: 4.88 MILL/UL (ref 3.8–5.4)
SODIUM SERPL-SCNC: 143 MMOL/L (ref 136–145)
SYSTOLIC BLOOD PRESSURE - MUSE: NORMAL MMHG
T AXIS - MUSE: 89 DEGREES
TRIGL SERPL-MCNC: 154 MG/DL
TSH SERPL DL<=0.005 MIU/L-ACNC: 0.4 UIU/ML (ref 0.3–5)
VENTRICULAR RATE- MUSE: 74 BPM
WBC: 6.3 THOU/UL (ref 4–11)

## 2018-03-14 ASSESSMENT — MIFFLIN-ST. JEOR: SCORE: 1328.53

## 2018-03-15 LAB
25(OH)D3 SERPL-MCNC: 55.5 NG/ML (ref 30–80)
25(OH)D3 SERPL-MCNC: 55.5 NG/ML (ref 30–80)

## 2018-03-16 ENCOUNTER — COMMUNICATION - HEALTHEAST (OUTPATIENT)
Dept: INTERNAL MEDICINE | Facility: CLINIC | Age: 63
End: 2018-03-16

## 2018-03-20 ENCOUNTER — AMBULATORY - HEALTHEAST (OUTPATIENT)
Dept: INTERNAL MEDICINE | Facility: CLINIC | Age: 63
End: 2018-03-20

## 2018-03-20 DIAGNOSIS — E03.9 ACQUIRED HYPOTHYROIDISM: ICD-10-CM

## 2018-03-23 ENCOUNTER — RECORDS - HEALTHEAST (OUTPATIENT)
Dept: ADMINISTRATIVE | Facility: OTHER | Age: 63
End: 2018-03-23

## 2018-03-30 ENCOUNTER — HOSPITAL ENCOUNTER (OUTPATIENT)
Dept: ULTRASOUND IMAGING | Facility: CLINIC | Age: 63
Discharge: HOME | End: 2018-03-30
Attending: INTERNAL MEDICINE

## 2018-03-30 ENCOUNTER — RECORDS - HEALTHEAST (OUTPATIENT)
Dept: ADMINISTRATIVE | Facility: OTHER | Age: 63
End: 2018-03-30

## 2018-03-30 DIAGNOSIS — D25.9 UTERINE FIBROID: ICD-10-CM

## 2018-04-02 ENCOUNTER — AMBULATORY - HEALTHEAST (OUTPATIENT)
Dept: INTERNAL MEDICINE | Facility: CLINIC | Age: 63
End: 2018-04-02

## 2018-04-02 DIAGNOSIS — R93.89 ENDOMETRIAL THICKENING ON ULTRA SOUND: ICD-10-CM

## 2018-04-03 ENCOUNTER — COMMUNICATION - HEALTHEAST (OUTPATIENT)
Dept: INTERNAL MEDICINE | Facility: CLINIC | Age: 63
End: 2018-04-03

## 2018-04-05 ENCOUNTER — RADIANT APPOINTMENT (OUTPATIENT)
Dept: CT IMAGING | Facility: CLINIC | Age: 63
End: 2018-04-05
Attending: INTERNAL MEDICINE
Payer: COMMERCIAL

## 2018-04-05 ENCOUNTER — ONCOLOGY VISIT (OUTPATIENT)
Dept: ONCOLOGY | Facility: CLINIC | Age: 63
End: 2018-04-05
Attending: PHYSICIAN ASSISTANT
Payer: COMMERCIAL

## 2018-04-05 VITALS
BODY MASS INDEX: 31.29 KG/M2 | RESPIRATION RATE: 18 BRPM | OXYGEN SATURATION: 97 % | TEMPERATURE: 98.1 F | HEIGHT: 64 IN | DIASTOLIC BLOOD PRESSURE: 81 MMHG | WEIGHT: 183.3 LBS | HEART RATE: 86 BPM | SYSTOLIC BLOOD PRESSURE: 125 MMHG

## 2018-04-05 DIAGNOSIS — R91.8 PULMONARY NODULES: ICD-10-CM

## 2018-04-05 DIAGNOSIS — C50.212 MALIGNANT NEOPLASM OF UPPER-INNER QUADRANT OF LEFT BREAST IN FEMALE, ESTROGEN RECEPTOR POSITIVE (H): Primary | ICD-10-CM

## 2018-04-05 DIAGNOSIS — Z17.0 MALIGNANT NEOPLASM OF UPPER-INNER QUADRANT OF LEFT BREAST IN FEMALE, ESTROGEN RECEPTOR POSITIVE (H): Primary | ICD-10-CM

## 2018-04-05 DIAGNOSIS — Z17.0 MALIGNANT NEOPLASM OF UPPER-INNER QUADRANT OF LEFT BREAST IN FEMALE, ESTROGEN RECEPTOR POSITIVE (H): ICD-10-CM

## 2018-04-05 DIAGNOSIS — C50.212 MALIGNANT NEOPLASM OF UPPER-INNER QUADRANT OF LEFT BREAST IN FEMALE, ESTROGEN RECEPTOR POSITIVE (H): ICD-10-CM

## 2018-04-05 LAB
ALBUMIN SERPL-MCNC: 3.4 G/DL (ref 3.4–5)
ALP SERPL-CCNC: 80 U/L (ref 40–150)
ALT SERPL W P-5'-P-CCNC: 26 U/L (ref 0–50)
ANION GAP SERPL CALCULATED.3IONS-SCNC: 5 MMOL/L (ref 3–14)
AST SERPL W P-5'-P-CCNC: 16 U/L (ref 0–45)
BASOPHILS # BLD AUTO: 0.1 10E9/L (ref 0–0.2)
BASOPHILS NFR BLD AUTO: 0.8 %
BILIRUB SERPL-MCNC: 0.6 MG/DL (ref 0.2–1.3)
BUN SERPL-MCNC: 16 MG/DL (ref 7–30)
CALCIUM SERPL-MCNC: 8.8 MG/DL (ref 8.5–10.1)
CHLORIDE SERPL-SCNC: 109 MMOL/L (ref 94–109)
CO2 SERPL-SCNC: 27 MMOL/L (ref 20–32)
CREAT SERPL-MCNC: 0.73 MG/DL (ref 0.52–1.04)
DIFFERENTIAL METHOD BLD: ABNORMAL
EOSINOPHIL # BLD AUTO: 0.9 10E9/L (ref 0–0.7)
EOSINOPHIL NFR BLD AUTO: 11.8 %
ERYTHROCYTE [DISTWIDTH] IN BLOOD BY AUTOMATED COUNT: 12.2 % (ref 10–15)
GFR SERPL CREATININE-BSD FRML MDRD: 80 ML/MIN/1.7M2
GLUCOSE SERPL-MCNC: 103 MG/DL (ref 70–99)
HCT VFR BLD AUTO: 44.1 % (ref 35–47)
HGB BLD-MCNC: 15.2 G/DL (ref 11.7–15.7)
IMM GRANULOCYTES # BLD: 0 10E9/L (ref 0–0.4)
IMM GRANULOCYTES NFR BLD: 0.5 %
LYMPHOCYTES # BLD AUTO: 2 10E9/L (ref 0.8–5.3)
LYMPHOCYTES NFR BLD AUTO: 26.1 %
MCH RBC QN AUTO: 31.7 PG (ref 26.5–33)
MCHC RBC AUTO-ENTMCNC: 34.5 G/DL (ref 31.5–36.5)
MCV RBC AUTO: 92 FL (ref 78–100)
MONOCYTES # BLD AUTO: 0.6 10E9/L (ref 0–1.3)
MONOCYTES NFR BLD AUTO: 7.5 %
NEUTROPHILS # BLD AUTO: 4 10E9/L (ref 1.6–8.3)
NEUTROPHILS NFR BLD AUTO: 53.3 %
NRBC # BLD AUTO: 0 10*3/UL
NRBC BLD AUTO-RTO: 0 /100
PLATELET # BLD AUTO: 228 10E9/L (ref 150–450)
POTASSIUM SERPL-SCNC: 3.5 MMOL/L (ref 3.4–5.3)
PROT SERPL-MCNC: 6.7 G/DL (ref 6.8–8.8)
RBC # BLD AUTO: 4.8 10E12/L (ref 3.8–5.2)
SODIUM SERPL-SCNC: 141 MMOL/L (ref 133–144)
WBC # BLD AUTO: 7.5 10E9/L (ref 4–11)

## 2018-04-05 PROCEDURE — 85025 COMPLETE CBC W/AUTO DIFF WBC: CPT | Performed by: PHYSICIAN ASSISTANT

## 2018-04-05 PROCEDURE — 36415 COLL VENOUS BLD VENIPUNCTURE: CPT

## 2018-04-05 PROCEDURE — 80053 COMPREHEN METABOLIC PANEL: CPT | Performed by: PHYSICIAN ASSISTANT

## 2018-04-05 PROCEDURE — G0463 HOSPITAL OUTPT CLINIC VISIT: HCPCS | Mod: ZF

## 2018-04-05 PROCEDURE — 99213 OFFICE O/P EST LOW 20 MIN: CPT | Mod: ZP | Performed by: PHYSICIAN ASSISTANT

## 2018-04-05 RX ORDER — MULTIVIT-MIN/IRON/FOLIC ACID/K 18-600-40
2000 CAPSULE ORAL DAILY
COMMUNITY
Start: 2018-04-05 | End: 2022-02-16

## 2018-04-05 ASSESSMENT — PAIN SCALES - GENERAL: PAINLEVEL: NO PAIN (0)

## 2018-04-05 NOTE — NURSING NOTE
Chief Complaint   Patient presents with     Blood Draw     Labs only     Vitals done, labs drawn by venipuncture.  See doc flow sheets for details.  Destini Price CMA

## 2018-04-05 NOTE — NURSING NOTE
"Oncology Rooming Note    April 5, 2018 10:24 AM   Cinthya Waite is a 62 year old female who presents for:    Chief Complaint   Patient presents with     Oncology Clinic Visit     f/u Breast ca     Initial Vitals: /81  Pulse 86  Temp 98.1  F (36.7  C) (Oral)  Resp 18  Ht 1.626 m (5' 4.02\")  Wt 83.1 kg (183 lb 4.8 oz)  LMP 06/07/2007  SpO2 97%  BMI 31.45 kg/m2 Estimated body mass index is 31.45 kg/(m^2) as calculated from the following:    Height as of this encounter: 1.626 m (5' 4.02\").    Weight as of this encounter: 83.1 kg (183 lb 4.8 oz). Body surface area is 1.94 meters squared.  No Pain (0) Comment: Data Unavailable   Patient's last menstrual period was 06/07/2007.  Allergies reviewed: Yes  Medications reviewed: Yes    Medications: Medication refills not needed today.  Pharmacy name entered into [facility]:    [id number] - [address] [place], [place] - [address] [facility] - [place], [place] - [address]    Clinical concerns: No new concerns. Provider was notified.    10 minutes for nursing intake (face to face time)     Denia Gimenez LPN            "

## 2018-04-05 NOTE — LETTER
4/5/2018      RE: Cinthya Waite  [address]  [place] [address]       Oncology/Hematology Visit Note  Apr 5, 2018    DIAGNOSIS:  Stage IIB (T2N0M0), ER positive, [place] positive, HER-2/[place] negative, left breast carcinoma. Status post lumpectomy on 08/13/2007 followed by 4 cycles of Taxotere and Cyclophosphamide. Patient completed radiation therapy with all treatment completed 01/2008. She was initiated on Tamoxifen but then when she became post-menopausal, changed to Arimidex in 10/2008 and took until 08/2013. She was restarted on the Tamoxifen in 08/2013 with a plan to take for 4 years to complete a total of 10 years of a hormonal therapy. She stopped the Tamoxifen in 09/2015 with an episode of vaginal bleeding. She had a D&C in October 2015 with benign pathology. Decision was made to not restart the Tamoxifen.    Interval History:  Cinthya returns to clinic today for follow up. She had a follow up chest CT this morning which showed decrease of ground glass opacities which are likely the sequela of a prior infection. She was happy to hear this. She has had no cough, dyspnea or other respiratory symptoms. She did have a URI over the winter, but symptoms have fully resolved. She had surgery of left hand for trigger finger involving the left middle finger and has been recovering. She has not been exercising recently because of this. She also had a vaginal US at end of March at [facility] and will see gynecologist, Dr. Wyman next week for follow up of fibroids and thickened endometrium. She has not had any vaginal bleeding since 2015. She has otherwise been feeling well.     ROS: Denies pain, fatigue, headaches, dizziness, SOB, chest pain, N/V, abdominal pain, changes in bowel or bladder habits. She has a PCP and tells me she is up to date on immunizations and colonoscopy.    Current Outpatient Prescriptions   Medication Sig Dispense Refill     Calcium Citrate 200 MG TABS Take 1 tablet by mouth  "daily       Denosumab (PROLIA SC)        aspirin 81 MG tablet Take 81 mg by mouth daily       VITAMIN D, CHOLECALCIFEROL, PO Take 5,000 Units by mouth daily       buPROPion (BUPROBAN) 150 MG 12 hr tablet Take 150 mg by mouth 2 times daily.        busPIRone (BUSPAR) 10 MG tablet Take 10 mg by mouth daily.       atorvastatin (LIPITOR) 40 MG tablet Take 40 mg by mouth daily.        hydrochlorothiazide (HYDRODIURIL) 25 MG tablet Take 1 tablet by mouth daily.       levothyroxine (SYNTHROID, LEVOTHROID) 125 MCG tablet 165 mcg daily          Physical Examination:  General: The patient is a pleasant female in no acute distress.  /81  Pulse 86  Temp 98.1  F (36.7  C) (Oral)  Resp 18  Ht 1.626 m (5' 4.02\")  Wt 83.1 kg (183 lb 4.8 oz)  LMP 06/07/2007  SpO2 97%  BMI 31.45 kg/m2  Wt Readings from Last 10 Encounters:   04/05/18 83.1 kg (183 lb 4.8 oz)   10/05/17 79.6 kg (175 lb 6.4 oz)   08/01/17 80.1 kg (176 lb 8 oz)   04/06/17 80.3 kg (177 lb 1.6 oz)   01/24/17 79.9 kg (176 lb 2.4 oz)   08/25/16 78.3 kg (172 lb 11.2 oz)   02/23/16 77.7 kg (171 lb 3.2 oz)   11/11/15 78 kg (172 lb)   10/28/15 79.6 kg (175 lb 7.8 oz)   10/06/15 78.9 kg (174 lb)     HEENT: EOMI, PERRL. Sclerae are anicteric. Oral mucosa is pink and moist with no lesions or thrush.   Lymph: No lymphadenopathy in the cervical, supraclavicular, subclavicular or axillary areas.   Heart: Regular rate and rhythm.   Lungs: Clear to auscultation bilaterally.   Breast: Bilateral nipple inversion. Right breast with no suspicious masses or lesions, no skin changes. Left breast with well healed surgical incision in upper inner quadrant with palpable scar tissue. No suspicious masses or lesions.  Abdomen: Bowel sounds present, soft, nontender with no palpable hepatosplenomegaly or masses.   Extremities: No lower extremity edema noted bilaterally.   Neuro: Cranial nerves II through XII are grossly intact.  Skin: No rashes, petechiae, or bruising noted on exposed " skin.    Laboratory Data:  Results for orders placed or performed in visit on 04/05/18 (from the past 24 hour(s))   *CBC with platelets differential   Result Value Ref Range    WBC 7.5 4.0 - 11.0 10e9/L    RBC Count 4.80 3.8 - 5.2 10e12/L    Hemoglobin 15.2 11.7 - 15.7 g/dL    Hematocrit 44.1 35.0 - 47.0 %    MCV 92 78 - 100 fl    MCH 31.7 26.5 - 33.0 pg    MCHC 34.5 31.5 - 36.5 g/dL    RDW 12.2 10.0 - 15.0 %    Platelet Count 228 150 - 450 10e9/L    Diff Method Automated Method     % Neutrophils 53.3 %    % Lymphocytes 26.1 %    % Monocytes 7.5 %    % Eosinophils 11.8 %    % Basophils 0.8 %    % Immature Granulocytes 0.5 %    Nucleated RBCs 0 0 /100    Absolute Neutrophil 4.0 1.6 - 8.3 10e9/L    Absolute Lymphocytes 2.0 0.8 - 5.3 10e9/L    Absolute Monocytes 0.6 0.0 - 1.3 10e9/L    Absolute Eosinophils 0.9 (H) 0.0 - 0.7 10e9/L    Absolute Basophils 0.1 0.0 - 0.2 10e9/L    Abs Immature Granulocytes 0.0 0 - 0.4 10e9/L    Absolute Nucleated RBC 0.0    Comprehensive metabolic panel   Result Value Ref Range    Sodium 141 133 - 144 mmol/L    Potassium 3.5 3.4 - 5.3 mmol/L    Chloride 109 94 - 109 mmol/L    Carbon Dioxide 27 20 - 32 mmol/L    Anion Gap 5 3 - 14 mmol/L    Glucose 103 (H) 70 - 99 mg/dL    Urea Nitrogen 16 7 - 30 mg/dL    Creatinine 0.73 0.52 - 1.04 mg/dL    GFR Estimate 80 >60 mL/min/1.7m2    GFR Estimate If Black >90 >60 mL/min/1.7m2    Calcium 8.8 8.5 - 10.1 mg/dL    Bilirubin Total 0.6 0.2 - 1.3 mg/dL    Albumin 3.4 3.4 - 5.0 g/dL    Protein Total 6.7 (L) 6.8 - 8.8 g/dL    Alkaline Phosphatase 80 40 - 150 U/L    ALT 26 0 - 50 U/L    AST 16 0 - 45 U/L      IMAGING:     EXAMINATION: Chest CT  4/5/2018 9:28 AM     CLINICAL HISTORY: ; Pulmonary nodules     COMPARISON: 10/4/2017.     TECHNIQUE: CT imaging obtained through the chest without contrast.  Coronal and axial MIP reformatted images obtained.      FINDINGS:  Heart size is normal. No pericardial effusion.  Normal thoracic  vasculature. No thoracic  lymphadenopathy. Central tracheobronchial  tree is patent.      Azygos lobe present. Mild reticulation in the superior right lower  lobe and the medial right upper lobe. Progressive decrease in ground  glass opacities in the superior aspect of the right upper lobe. There  is a 6 mm nodular opacity in the lingula along the major fissure  (series 4 image 212), unchanged. There is a 3 mm pulmonary nodule in  the left lower lobe along the major fissure (series 4 image 127),  stable no new or enlarging pulmonary nodules.. No pleural effusion or  pneumothorax.     Bones and soft tissues: No suspicious bone findings. Unchanged 1.7 cm  nodule in the left breast with biopsy clip present.     Partially imaged upper abdomen: Paraesophageal hiatal hernia.         IMPRESSION:   1. Progressive decrease in groundglass opacities in the superior  aspect of the right upper lobe with continued mild reticulation.  Likely sequela of prior infection.  2. Moderate paraesophageal hiatal hernia.  3. Unchanged nodular soft tissue in the left breast with biopsy marker  present. Continue annual mammography.     I have personally reviewed the examination and initial interpretation  and I agree with the findings.     PAL POLO MD    Assessment and Plan:    1.  Cinthya Waite is a 62-year-old woman with a history of a stage IIA, T2 N0 M0, ER positive, [place] positive and HER-2 negative invasive ductal carcinoma of the left breast diagnosed in 2007.  No signs or symptoms that suggest recurrence of her breast carcinoma.   --She will see Dr. Herrera in October 2018 with annual surveillance screening mammography.   2.  Hormonal therapy.  Cinthya is now off hormonal therapy after having completed 7-1/2 years. She is following up with gynecologist next week regarding thickened endometrium on US 3/30/18.  3.  Small ground-glass nodular opacities in the right lower lobe, favoring resolving infection.   Followup chest CT scan at 6 months with decrease in  opacities as above. Likely sequela of prior infection.  4.  Continue with calcium and vitamin D. Decreased the vitamin D dose to 2000 international units daily last visit.   5.   Recommended she resume 150 minutes of exercise a week.   6.  Continue with a diet that is low in saturated fat.        Alba Dewey PA-C  [facility]  [address], [place] [address] [phone]

## 2018-04-05 NOTE — PROGRESS NOTES
Oncology/Hematology Visit Note  Apr 5, 2018    DIAGNOSIS:  Stage IIB (T2N0M0), ER positive, [place] positive, HER-2/[place] negative, left breast carcinoma. Status post lumpectomy on 08/13/2007 followed by 4 cycles of Taxotere and Cyclophosphamide. Patient completed radiation therapy with all treatment completed 01/2008. She was initiated on Tamoxifen but then when she became post-menopausal, changed to Arimidex in 10/2008 and took until 08/2013. She was restarted on the Tamoxifen in 08/2013 with a plan to take for 4 years to complete a total of 10 years of a hormonal therapy. She stopped the Tamoxifen in 09/2015 with an episode of vaginal bleeding. She had a D&C in October 2015 with benign pathology. Decision was made to not restart the Tamoxifen.    Interval History:  Cinthya returns to clinic today for follow up. She had a follow up chest CT this morning which showed decrease of ground glass opacities which are likely the sequela of a prior infection. She was happy to hear this. She has had no cough, dyspnea or other respiratory symptoms. She did have a URI over the winter, but symptoms have fully resolved. She had surgery of left hand for trigger finger involving the left middle finger and has been recovering. She has not been exercising recently because of this. She also had a vaginal US at end of March at [facility] and will see gynecologist, Dr. Wyman next week for follow up of fibroids and thickened endometrium. She has not had any vaginal bleeding since 2015. She has otherwise been feeling well.     ROS: Denies pain, fatigue, headaches, dizziness, SOB, chest pain, N/V, abdominal pain, changes in bowel or bladder habits. She has a PCP and tells me she is up to date on immunizations and colonoscopy.    Current Outpatient Prescriptions   Medication Sig Dispense Refill     Calcium Citrate 200 MG TABS Take 1 tablet by mouth daily       Denosumab (PROLIA SC)        aspirin 81 MG tablet Take 81 mg by mouth daily        "VITAMIN D, CHOLECALCIFEROL, PO Take 5,000 Units by mouth daily       buPROPion (BUPROBAN) 150 MG 12 hr tablet Take 150 mg by mouth 2 times daily.        busPIRone (BUSPAR) 10 MG tablet Take 10 mg by mouth daily.       atorvastatin (LIPITOR) 40 MG tablet Take 40 mg by mouth daily.        hydrochlorothiazide (HYDRODIURIL) 25 MG tablet Take 1 tablet by mouth daily.       levothyroxine (SYNTHROID, LEVOTHROID) 125 MCG tablet 165 mcg daily          Physical Examination:  General: The patient is a pleasant female in no acute distress.  /81  Pulse 86  Temp 98.1  F (36.7  C) (Oral)  Resp 18  Ht 1.626 m (5' 4.02\")  Wt 83.1 kg (183 lb 4.8 oz)  LMP 06/07/2007  SpO2 97%  BMI 31.45 kg/m2  Wt Readings from Last 10 Encounters:   04/05/18 83.1 kg (183 lb 4.8 oz)   10/05/17 79.6 kg (175 lb 6.4 oz)   08/01/17 80.1 kg (176 lb 8 oz)   04/06/17 80.3 kg (177 lb 1.6 oz)   01/24/17 79.9 kg (176 lb 2.4 oz)   08/25/16 78.3 kg (172 lb 11.2 oz)   02/23/16 77.7 kg (171 lb 3.2 oz)   11/11/15 78 kg (172 lb)   10/28/15 79.6 kg (175 lb 7.8 oz)   10/06/15 78.9 kg (174 lb)     HEENT: EOMI, PERRL. Sclerae are anicteric. Oral mucosa is pink and moist with no lesions or thrush.   Lymph: No lymphadenopathy in the cervical, supraclavicular, subclavicular or axillary areas.   Heart: Regular rate and rhythm.   Lungs: Clear to auscultation bilaterally.   Breast: Bilateral nipple inversion. Right breast with no suspicious masses or lesions, no skin changes. Left breast with well healed surgical incision in upper inner quadrant with palpable scar tissue. No suspicious masses or lesions.  Abdomen: Bowel sounds present, soft, nontender with no palpable hepatosplenomegaly or masses.   Extremities: No lower extremity edema noted bilaterally.   Neuro: Cranial nerves II through XII are grossly intact.  Skin: No rashes, petechiae, or bruising noted on exposed skin.    Laboratory Data:  Results for orders placed or performed in visit on 04/05/18 (from the " past 24 hour(s))   *CBC with platelets differential   Result Value Ref Range    WBC 7.5 4.0 - 11.0 10e9/L    RBC Count 4.80 3.8 - 5.2 10e12/L    Hemoglobin 15.2 11.7 - 15.7 g/dL    Hematocrit 44.1 35.0 - 47.0 %    MCV 92 78 - 100 fl    MCH 31.7 26.5 - 33.0 pg    MCHC 34.5 31.5 - 36.5 g/dL    RDW 12.2 10.0 - 15.0 %    Platelet Count 228 150 - 450 10e9/L    Diff Method Automated Method     % Neutrophils 53.3 %    % Lymphocytes 26.1 %    % Monocytes 7.5 %    % Eosinophils 11.8 %    % Basophils 0.8 %    % Immature Granulocytes 0.5 %    Nucleated RBCs 0 0 /100    Absolute Neutrophil 4.0 1.6 - 8.3 10e9/L    Absolute Lymphocytes 2.0 0.8 - 5.3 10e9/L    Absolute Monocytes 0.6 0.0 - 1.3 10e9/L    Absolute Eosinophils 0.9 (H) 0.0 - 0.7 10e9/L    Absolute Basophils 0.1 0.0 - 0.2 10e9/L    Abs Immature Granulocytes 0.0 0 - 0.4 10e9/L    Absolute Nucleated RBC 0.0    Comprehensive metabolic panel   Result Value Ref Range    Sodium 141 133 - 144 mmol/L    Potassium 3.5 3.4 - 5.3 mmol/L    Chloride 109 94 - 109 mmol/L    Carbon Dioxide 27 20 - 32 mmol/L    Anion Gap 5 3 - 14 mmol/L    Glucose 103 (H) 70 - 99 mg/dL    Urea Nitrogen 16 7 - 30 mg/dL    Creatinine 0.73 0.52 - 1.04 mg/dL    GFR Estimate 80 >60 mL/min/1.7m2    GFR Estimate If Black >90 >60 mL/min/1.7m2    Calcium 8.8 8.5 - 10.1 mg/dL    Bilirubin Total 0.6 0.2 - 1.3 mg/dL    Albumin 3.4 3.4 - 5.0 g/dL    Protein Total 6.7 (L) 6.8 - 8.8 g/dL    Alkaline Phosphatase 80 40 - 150 U/L    ALT 26 0 - 50 U/L    AST 16 0 - 45 U/L      IMAGING:     EXAMINATION: Chest CT  4/5/2018 9:28 AM     CLINICAL HISTORY: ; Pulmonary nodules     COMPARISON: 10/4/2017.     TECHNIQUE: CT imaging obtained through the chest without contrast.  Coronal and axial MIP reformatted images obtained.      FINDINGS:  Heart size is normal. No pericardial effusion.  Normal thoracic  vasculature. No thoracic lymphadenopathy. Central tracheobronchial  tree is patent.      Azygos lobe present. Mild  reticulation in the superior right lower  lobe and the medial right upper lobe. Progressive decrease in ground  glass opacities in the superior aspect of the right upper lobe. There  is a 6 mm nodular opacity in the lingula along the major fissure  (series 4 image 212), unchanged. There is a 3 mm pulmonary nodule in  the left lower lobe along the major fissure (series 4 image 127),  stable no new or enlarging pulmonary nodules.. No pleural effusion or  pneumothorax.     Bones and soft tissues: No suspicious bone findings. Unchanged 1.7 cm  nodule in the left breast with biopsy clip present.     Partially imaged upper abdomen: Paraesophageal hiatal hernia.         IMPRESSION:   1. Progressive decrease in groundglass opacities in the superior  aspect of the right upper lobe with continued mild reticulation.  Likely sequela of prior infection.  2. Moderate paraesophageal hiatal hernia.  3. Unchanged nodular soft tissue in the left breast with biopsy marker  present. Continue annual mammography.     I have personally reviewed the examination and initial interpretation  and I agree with the findings.     PAL POLO MD    Assessment and Plan:    1.  Cinthya Waite is a 62-year-old woman with a history of a stage IIA, T2 N0 M0, ER positive, [place] positive and HER-2 negative invasive ductal carcinoma of the left breast diagnosed in 2007.  No signs or symptoms that suggest recurrence of her breast carcinoma.   --She will see Dr. Herrera in October 2018 with annual surveillance screening mammography.   2.  Hormonal therapy.  Cinthya is now off hormonal therapy after having completed 7-1/2 years. She is following up with gynecologist next week regarding thickened endometrium on US 3/30/18.  3.  Small ground-glass nodular opacities in the right lower lobe, favoring resolving infection.  Followup chest CT scan at 6 months with decrease in opacities as above. Likely sequela of prior infection.  4.  Continue with calcium and  vitamin D. Decreased the vitamin D dose to 2000 international units daily last visit.   5.  Recommended she resume 150 minutes of exercise a week.   6.  Continue with a diet that is low in saturated fat.        Alba Dewey PA-C  [facility] [id number] [place] [address] [phone]

## 2018-04-05 NOTE — MR AVS SNAPSHOT
"              After Visit Summary   4/5/2018    Cinthya Waite    MRN: [id number]           Patient Information     Date Of Birth          1955        Visit Information        Provider Department      4/5/2018 10:30 AM Alba Dewey PA [facility]        Today's Diagnoses     Malignant neoplasm of upper-inner quadrant of left breast in female, estrogen receptor positive (H)    -  1       Follow-ups after your visit        Your next 10 appointments already scheduled     Apr 10, 2018  7:45 AM CDT   Return Visit with Cyndee Wyman MD   [facility] ([facility])    [facility] [address]  [address]  [facility] [place] [address]   [phone]            Oct 04, 2018  9:30 AM CDT   [facility] Lab Draw with  [employer] LAB DRAW   [facility] Masonic Lab Draw ([facility])    [address]  [address]  [facility] [place] [address] [phone]            Oct 04, 2018 10:00 AM CDT   (Arrive by 9:45 AM)   MA SCREENING BILATERAL W/ CADE with UCBCMA1   [facility] ([facility])    [address]  2nd Floor  [facility] [place] [address] [phone]           Three-dimensional (3D) mammograms are available at [place] locations in [facility], [place], [place], [facility], [place], [place], and [place]. [facility] locations include [place] and [facility] in [place]. Benefits of 3D mammograms include: - Improved rate of cancer detection - Decreases your chance of having to go back for more tests, which means fewer: - \"False-positive\" results (This means that there is an abnormal area but it isn't cancer.) - Invasive testing procedures, such as a biopsy or surgery - Can provide clearer images of the breast if you have dense breast tissue. 3D mammography is an optional exam that anyone can have with a 2D mammogram. It doesn't replace " "or take the place of a 2D mammogram. 2D mammograms remain an effective screening test for all women.  Not all insurance companies cover the cost of a 3D mammogram. Check with your insurance.            Oct 04, 2018 10:30 AM CDT   (Arrive by 10:15 AM)   Return Visit with Johnathan Herrera MD   [facility] ([facility])    [address]  [address]  [facility] [address] [phone]              Who to contact     If you have questions or need follow up information about today's clinic visit or your schedule please contact [facility] directly at [phone].  Normal or non-critical lab and imaging results will be communicated to you by MyChart, letter or phone within 4 business days after the clinic has received the results. If you do not hear from us within 7 days, please contact the clinic through [employer] or phone. If you have a critical or abnormal lab result, we will notify you by phone as soon as possible.  Submit refill requests through [employer] or call your pharmacy and they will forward the refill request to us. Please allow 3 business days for your refill to be completed.          Additional Information About Your Visit        [employer] Information     [employer] gives you secure access to your electronic health record. If you see a primary care provider, you can also send messages to your care team and make appointments. If you have questions, please call your primary care clinic.  If you do not have a primary care provider, please call [phone] and they will assist you.        Care EveryWhere ID     This is your Care EveryWhere ID. This could be used by other organizations to access your [place] medical records  [id number]        Your Vitals Were     Pulse Temperature Respirations Height Last Period Pulse Oximetry    86 98.1  F (36.7  C) (Oral) 18 1.626 m (5' 4.02\") 06/07/2007 97%    BMI (Body Mass Index)                   " 31.45 kg/m2            Blood Pressure from Last 3 Encounters:   04/05/18 125/81   10/05/17 120/79   08/01/17 129/84    Weight from Last 3 Encounters:   04/05/18 83.1 kg (183 lb 4.8 oz)   10/05/17 79.6 kg (175 lb 6.4 oz)   08/01/17 80.1 kg (176 lb 8 oz)                 Today's Medication Changes          These changes are accurate as of 4/5/18  1:12 PM.  If you have any questions, ask your nurse or doctor.               These medicines have changed or have updated prescriptions.        Dose/Directions    Vitamin D (Cholecalciferol) 1000 UNITS Tabs   This may have changed:    - medication strength  - how much to take   Changed by:  Alba Dewey, PA        Dose:  2000 Units   Take 2,000 Units by mouth daily   Refills:  0                Primary Care Provider Office Phone # Fax #    Heidi Valencia [phone]       [place] [address]        Equal Access to Services     YASEMIN REYEZ : Hadii gurdeep ku hadasho Soomaali, waaxda luqadaha, qaybta kaalmada adeegyada, waxcolton caldwellin aisha valentin . So [facility] [phone].    ATENCIÓN: Si habla español, tiene a ames disposición servicios gratuitos de asistencia lingüística. Llame al [phone].    We comply with applicable federal civil rights laws and [place] laws. We do not discriminate on the basis of race, color, national origin, age, disability, sex, sexual orientation, or gender identity.            Thank you!     Thank you for choosing [facility]  for your care. Our goal is always to provide you with excellent care. Hearing back from our patients is one way we can continue to improve our services. Please take a few minutes to complete the written survey that you may receive in the mail after your visit with us. Thank you!             Your Updated Medication List - Protect others around you: Learn how to safely use, store and throw away your medicines at  www.disposemymeds.org.          This list is accurate as of 4/5/18  1:12 PM.  Always use your most recent med list.                   Brand Name Dispense Instructions for use Diagnosis    aspirin 81 MG tablet      Take 81 mg by mouth daily        BUPROBAN 150 MG 12 hr tablet   Generic drug:  buPROPion      Take 150 mg by mouth 2 times daily.        BUSPAR 10 MG tablet   Generic drug:  busPIRone      Take 10 mg by mouth daily.        Calcium Citrate 200 MG Tabs      Take 1 tablet by mouth daily        hydrochlorothiazide 25 MG tablet    HYDRODIURIL     Take 1 tablet by mouth daily.        levothyroxine 125 MCG tablet    SYNTHROID/LEVOTHROID     165 mcg daily        LIPITOR 40 MG tablet   Generic drug:  atorvastatin      Take 40 mg by mouth daily.        PROLIA SC           Vitamin D (Cholecalciferol) 1000 UNITS Tabs      Take 2,000 Units by mouth daily

## 2018-04-10 ENCOUNTER — OFFICE VISIT (OUTPATIENT)
Dept: OBGYN | Facility: CLINIC | Age: 63
End: 2018-04-10
Attending: OBSTETRICS & GYNECOLOGY
Payer: COMMERCIAL

## 2018-04-10 ENCOUNTER — RECORDS - HEALTHEAST (OUTPATIENT)
Dept: ADMINISTRATIVE | Facility: OTHER | Age: 63
End: 2018-04-10

## 2018-04-10 VITALS
WEIGHT: 185 LBS | HEIGHT: 64 IN | DIASTOLIC BLOOD PRESSURE: 82 MMHG | BODY MASS INDEX: 31.58 KG/M2 | HEART RATE: 80 BPM | SYSTOLIC BLOOD PRESSURE: 122 MMHG

## 2018-04-10 DIAGNOSIS — R93.89 THICKENED ENDOMETRIUM: Primary | ICD-10-CM

## 2018-04-10 ASSESSMENT — PAIN SCALES - GENERAL: PAINLEVEL: NO PAIN (0)

## 2018-04-10 NOTE — LETTER
"4/10/2018       RE: Cinthya Waite  [address]  [place] [address]     Dear Colleague,    Thank you for referring your patient, Cinthya Waite, to the [facility] at [facility]. Please see a copy of my visit note below.    [facility]  Gynecology Visit    HPI:    Cinthya Waite is a 62 year old  with a PMH significant for Stage II B ER+/ID+/HER2 (-) Left breast carcinoma, s/p lumpectomy, chemotherapy and radiation completed in ,presents with thickened EMS.  7 mm on sono on 3/30/18 at [facility]. Patient says her close friend who also had a diagnosis of Breast Ca, recently got diagnosed with endometrial ca. This has caused her a lot of anxiety and concern; and is here to rule out endometrial ca and discuss future options available.   Cinthya denies any h/o vaginal bleed or discharge, abdominal cramping, hot flashes, n/v, night sweats or sleep disturbance.    GYN History  - Menses: Patient's last menstrual period was 2007..       Lab Results   Component Value Date    PAP NIL 2010    PAP NIL 2008    PAP NIL 2007       OBHx  Obstetric History       T2      L2     SAB0   TAB0   Ectopic0   Multiple0   Live Births2       # Outcome Date GA Lbr Franky/2nd Weight Sex Delivery Anes PTL Lv   4  85 40w0d    CS   FAWN   3  10/08/82 40w0d    CS   FAWN   2 Term            1 Term                   ROS: 10-Point ROS negative except as noted in HPI    Physical Exam  /82  Pulse 80  Ht 1.626 m (5' 4\")  Wt 83.9 kg (185 lb)  LMP 2007  Breastfeeding? No  BMI 31.76 kg/m2  Body mass index is 31.76 kg/(m^2).  Gen: Well-appearing, NAD    Assessment/Plan:  Cinthya Waite is a 62 year old  female here for concerns about endometrial carcinoma.   We discussed options regarding Endometrial biopsy vs D&C hysteroscopy vs hysterectomy. Patient expressed interest in getting " a hysterectomy done. This seems like a reasonable option given her h/o fibroids, endothelial thickening of 7mm in last sono, and ER+ Breast carcinoma.  - RTC for hysterectomy consult with one of my colleagues.  -   Roselia Swenson, MS-3 acting as a scribe for Cyndee Wyman MD  Pager # [phone]  The above patient was seen and evaluated with the medical student who acted as my scribe for the above note. Agree with note, changes made as appropriate.    Again, thank you for allowing me to participate in the care of your patient.      Sincerely,    Cyndee Wyman MD

## 2018-04-10 NOTE — MR AVS SNAPSHOT
"              After Visit Summary   4/10/2018    Cinthya Waite    MRN: [id number]           Patient Information     Date Of Birth          1955        Visit Information        Provider Department      4/10/2018 7:45 AM Cyndee Wyman MD [facility]        Today's Diagnoses     Thickened endometrium    -  1       Follow-ups after your visit        Your next 10 appointments already scheduled     Apr 19, 2018  9:15 AM CDT   Return Visit with Cyndee Caballero MD   [facility] ([facility])    [facility] [address]  [address]  [address]  [facility] [address]   [phone]            Oct 04, 2018  9:30 AM CDT   Masonic Lab Draw with  MASONIC LAB DRAW   [facility] Masonic Lab Draw ([facility])    [address]  [address]  [facility] [address] [phone]            Oct 04, 2018 10:00 AM CDT   (Arrive by 9:45 AM)   MA SCREENING BILATERAL W/ CADE with UCBCMA1   [facility] ([facility])    [address]  2nd Floor  [facility] [address] [phone]           Three-dimensional (3D) mammograms are available at [place] locations in [facility], [place], [place], [facility], [place], [place], and [place]. [facility] locations include [place] and [facility] in [place]. Benefits of 3D mammograms include: - Improved rate of cancer detection - Decreases your chance of having to go back for more tests, which means fewer: - \"False-positive\" results (This means that there is an abnormal area but it isn't cancer.) - Invasive testing procedures, such as a biopsy or surgery - Can provide clearer images of the breast if you have dense breast tissue. 3D mammography is an optional exam that anyone can have with a 2D mammogram. It doesn't replace or take the place of a 2D mammogram. 2D mammograms remain an effective " "screening test for all women.  Not all insurance companies cover the cost of a 3D mammogram. Check with your insurance.            Oct 04, 2018 10:30 AM CDT   (Arrive by 10:15 AM)   Return Visit with Johnathan Herrera MD   [facility] ([facility] [place])    [address]  [address]  [facility] [address] [phone]              Who to contact     Please call your clinic at [phone] to:    Ask questions about your health    Make or cancel appointments    Discuss your medicines    Learn about your test results    Speak to your doctor            Additional Information About Your Visit        healthfinch Information     [employer]finch gives you secure access to your electronic health record. If you see a primary care provider, you can also send messages to your care team and make appointments. If you have questions, please call your primary care clinic.  If you do not have a primary care provider, please call [phone] and they will assist you.      healthfinch is an electronic gateway that provides easy, online access to your medical records. With healthfinch, you can request a clinic appointment, read your test results, renew a prescription or communicate with your care team.     To access your existing account, please contact your [facility] or call [phone] for assistance.        Care EveryWhere ID     This is your Care EveryWhere ID. This could be used by other organizations to access your [place] medical records  [id number]        Your Vitals Were     Pulse Height Last Period Breastfeeding? BMI (Body Mass Index)       80 1.626 m (5' 4\") 06/07/2007 No 31.76 kg/m2        Blood Pressure from Last 3 Encounters:   04/10/18 122/82   04/05/18 125/81   10/05/17 120/79    Weight from Last 3 Encounters:   04/10/18 83.9 kg (185 lb)   04/05/18 83.1 kg (183 lb 4.8 oz)   10/05/17 79.6 kg (175 lb 6.4 oz)              Today, you had the following  "    No orders found for display       Primary Care Provider Office Phone # Fax #    Heidi Valencia [phone]       [facility] [address] [place] [address]        Equal Access to Services     ABELINO REYEZ : Hadii aad ku haddio Taveras, waaxda luqadaha, qaybta kaalmada aderadhada, veda varghese barbie richards. So [facility] [phone].    ATENCIÓN: Si habla español, tiene a ames disposición servicios gratuitos de asistencia lingüística. Llame al [phone].    We comply with applicable federal civil rights laws and [place] laws. We do not discriminate on the basis of race, color, national origin, age, disability, sex, sexual orientation, or gender identity.            Thank you!     Thank you for choosing [facility]  for your care. Our goal is always to provide you with excellent care. Hearing back from our patients is one way we can continue to improve our services. Please take a few minutes to complete the written survey that you may receive in the mail after your visit with us. Thank you!             Your Updated Medication List - Protect others around you: Learn how to safely use, store and throw away your medicines at www.disposemymeds.org.          This list is accurate as of 4/10/18 12:12 PM.  Always use your most recent med list.                   Brand Name Dispense Instructions for use Diagnosis    aspirin 81 MG tablet      Take 81 mg by mouth daily        BUPROBAN 150 MG 12 hr tablet   Generic drug:  buPROPion      Take 150 mg by mouth 2 times daily.        BUSPAR 10 MG tablet   Generic drug:  busPIRone      Take 10 mg by mouth daily.        Calcium Citrate 200 MG Tabs      Take 1 tablet by mouth daily        hydrochlorothiazide 25 MG tablet    HYDRODIURIL     Take 1 tablet by mouth daily.        levothyroxine 125 MCG tablet    SYNTHROID/LEVOTHROID     165 mcg daily        LIPITOR 40 MG tablet   Generic drug:  atorvastatin       Take 40 mg by mouth daily.        PROLIA SC           Vitamin D (Cholecalciferol) 1000 UNITS Tabs      Take 2,000 Units by mouth daily

## 2018-04-10 NOTE — PROGRESS NOTES
"[facility]  Gynecology Visit    HPI:    Cinthya Waite is a 62 year old  with a PMH significant for Stage II B ER+/MD+/HER2 (-) Left breast carcinoma, s/p lumpectomy, chemotherapy and radiation completed in ,presents with thickened EMS.  7 mm on sono on 3/30/18 at [facility]. Patient says her close friend who also had a diagnosis of Breast Ca, recently got diagnosed with endometrial ca. This has caused her a lot of anxiety and concern; and is here to rule out endometrial ca and discuss future options available.   Cinthya denies any h/o vaginal bleed or discharge, abdominal cramping, hot flashes, n/v, night sweats or sleep disturbance.    GYN History  - Menses: Patient's last menstrual period was 2007..       Lab Results   Component Value Date    PAP NIL 2010    PAP NIL 2008    PAP NIL 2007       OBHx  Obstetric History       T2      L2     SAB0   TAB0   Ectopic0   Multiple0   Live Births2       # Outcome Date GA Lbr Franky/2nd Weight Sex Delivery Anes PTL Lv   4  85 40w0d    CS   FAWN   3  10/08/82 40w0d    CS   FAWN   2 Term            1 Term                   ROS: 10-Point ROS negative except as noted in HPI    Physical Exam  /82  Pulse 80  Ht 1.626 m (5' 4\")  Wt 83.9 kg (185 lb)  LMP 2007  Breastfeeding? No  BMI 31.76 kg/m2  Body mass index is 31.76 kg/(m^2).  Gen: Well-appearing, NAD    Assessment/Plan:  Cinthya Waite is a 62 year old  female here for concerns about endometrial carcinoma.   We discussed options regarding Endometrial biopsy vs D&C hysteroscopy vs hysterectomy. Patient expressed interest in getting a hysterectomy done. This seems like a reasonable option given her h/o fibroids, endothelial thickening of 7mm in last sono, and ER+ Breast carcinoma.  - RTC for hysterectomy consult with one of my colleagues.  -   Roselia Swenson, MS-3 acting as a scribe for Cynede Wyman MD  Pager # [phone]  The above patient " was seen and evaluated with the medical student who acted as my scribe for the above note. Agree with note, changes made as appropriate.    Cyndee Wyman MD

## 2018-04-10 NOTE — NURSING NOTE
Chief Complaint   Patient presents with     RECHECK     Follow-up Endometriosis   Carline Parks LPN

## 2018-04-19 ENCOUNTER — OFFICE VISIT (OUTPATIENT)
Dept: OBGYN | Facility: CLINIC | Age: 63
End: 2018-04-19
Attending: OBSTETRICS & GYNECOLOGY
Payer: COMMERCIAL

## 2018-04-19 ENCOUNTER — RECORDS - HEALTHEAST (OUTPATIENT)
Dept: ADMINISTRATIVE | Facility: OTHER | Age: 63
End: 2018-04-19

## 2018-04-19 VITALS
WEIGHT: 183 LBS | BODY MASS INDEX: 31.24 KG/M2 | HEIGHT: 64 IN | HEART RATE: 76 BPM | DIASTOLIC BLOOD PRESSURE: 86 MMHG | SYSTOLIC BLOOD PRESSURE: 129 MMHG

## 2018-04-19 DIAGNOSIS — R93.89 THICKENED ENDOMETRIUM: Primary | ICD-10-CM

## 2018-04-19 DIAGNOSIS — Z85.3 PERSONAL HISTORY OF MALIGNANT NEOPLASM OF BREAST: ICD-10-CM

## 2018-04-19 PROCEDURE — 88305 TISSUE EXAM BY PATHOLOGIST: CPT | Performed by: OBSTETRICS & GYNECOLOGY

## 2018-04-19 PROCEDURE — 58100 BIOPSY OF UTERUS LINING: CPT | Mod: ZF | Performed by: OBSTETRICS & GYNECOLOGY

## 2018-04-19 ASSESSMENT — PAIN SCALES - GENERAL: PAINLEVEL: NO PAIN (0)

## 2018-04-19 NOTE — LETTER
4/19/2018       RE: Cinthya Waite  [address]  [facility] [place] [address]     Dear Colleague,    Thank you for referring your patient, Cinthya Waite, to the [facility] at [facility]. Please see a copy of my visit note below.    61 yo  with h/o Stage IIB ER+/UT+/HER2- left breast cancer with thickened endometrial stripe on ultrasound presents to discuss hysterectomy.  The patient has a friend with a similar medical history who went on to develop endometrial cancer.  This prompted her to see Dr. Wyman last week to review her risks.  A pelvic ultrasound showed and endometrial thickness of 7mm (done at [facility]).  She has had no vaginal bleeding.    The patient does have a history of postmenopausal bleeding and had a hysteroscopy D&C in October, 2015.  Pathology from that surgery showed a benign endocervical polyp and benign atrophic endometrium.  She has not had bleeding since and is not currently on any hormonal affecting medications such as aromatase inhibitors or tamoxifen.  She has thought about the options Dr. Wyman present and she has decided she wishes to proceed with hysterectomy.      Patient denies other  symptoms.  She denies bladder symptoms or bowel symptoms.  Denies urinary/fecal incontinence.     ROS: 10 point ROS neg other than the symptoms noted above in the HPI.    Past Medical History:   Diagnosis Date     Abnormal Pap smear      Breast cancer (H) 7/2007    T1N0 left breast cancer     Depressive disorder, not elsewhere classified     Buspar, wellbutrin zoloft  sees Dr. Lim      Lichen sclerosus et atrophicus      Pure hypercholesterolemia     Lipitor     Raynaud's disease      Unspecified essential hypertension      Unspecified hypothyroidism     synthroid     Past Surgical History:   Procedure Laterality Date     ABDOMEN SURGERY      c- section x 2     APPENDECTOMY       BIOPSY       BREAST  "LUMPECTOMY, RT/LT  2007    Breast Lumpectomy /LT     C NONSPECIFIC PROCEDURE  10/82,     x2     C NONSPECIFIC PROCEDURE      Appendicitis     C NONSPECIFIC PROCEDURE      Hysteroscopy, D & C     COLONOSCOPY       DILATION AND CURETTAGE, OPERATIVE HYSTEROSCOPY WITH MORCELLATOR, COMBINED N/A 10/28/2015    Procedure: COMBINED DILATION AND CURETTAGE, OPERATIVE HYSTEROSCOPY WITH MORCELLATOR;  Surgeon: Cyndee Wyman MD;  Location: [facility] OR     Family History   Problem Relation Age of Onset     HEART DISEASE Father      HEART DISEASE Sister      HEART DISEASE Brother      Neurologic Disorder Mother      migraines     Genitourinary Problems Sister      gallstones     Depression Sister      Depression Brother      Depression Sister      Depression Brother      DIABETES Father      DIABETES Brother      Hypertension Father      CEREBROVASCULAR DISEASE Father      Thyroid Disease Father      Breast Cancer Mother      Physical exam:  /86  Pulse 76  Ht 1.626 m (5' 4\")  Wt 83 kg (183 lb)  LMP 2007  Breastfeeding? No  BMI 31.41 kg/m2  Gen'l: appears well, no distress  CV: RRR  Lungs: CTA bilaterally  Abd: soft, NT, ND no palpable masses, well healed incisional scars  Vulva: lichenified appearance with agglutination of bilateral labia minora and clitoral valencia, introitus allow Nora speculum.  Urethra: normal  Vagina: pink, normal rugae, phsyiologic discharge present  Cervix: no lesion, no bleeding, small  Uterus: mobile, NT, midpositioned, normal in size  Adnexa: no palpable masses, NT  Rectum: Anus normal, no rectovaginal exam done    U/s: 3/30/18  Uterus 8.4 x 3.6 x 5.3 cm, 2 myomas: 0.8x1x1.1 cm left intramural, 1x1.4 x1.6 cm in upper uterus,  Endometrium 7mm, smooth  Right ov 3.1 x 2.2 x 3 cm, Left ov 2.6 x 2.1 x 2.8 cm    Assessment/Plan  61 yo with h/o ER/[place] + breast CA  Thickened endometrium in setting of no current PMB    - We discussed the risks and benefits of " "hysterectomy vs biopsy/observation.  Lining is thickened for post-menopausal status but not exceedingly so and given less than 11mm with no bleeding it may be reasonable to observe as well.  Patient is aware of her options and given her anxiety and her friend's experience she wishes to proceed with hysterectomy and she is hoping for a minimally invasive route.  - We discussed routes of hysterectomy and given prior surgical history including 2 c/s and our desire to remove both tubes and ovaries recommend robotic assisted laparoscopic approach.  Risk, benefits and alternatives of surgery including bleeding, infection, damage to nearby structures including but not limited to bladder, ureter, bowel, blood vessels and nerves were all reviewed with the patient.  We also reviewed the details of robotic surgery, the length of the procedure and the positioning required.  -Recommend EMB prior to surgery to evaluate for endometrial malignancy which would require onc referral and patient agreed today in clinic.  - Orders placed for RA-TLH/BSO. Patient agrees to removal of both tubes, ovaries and cervix.  - Patient aware will need pre-op exam with PCP.    Cyndee Caballero MD    Endometrial Biopsy                                                                       Time Out - \"Pause for the Cause\"  Just before the procedure begins, through verbal and active participation of team members, verify:    Initials   Patient Name    mip   Patient Date of Birth mip   Procedure to be performed  mip   Site, laterality, level or multiples, noting patient position   mip   Relevant images or diagnostics available/displayed   mip   Implants, special equipment or special requirements        mip     Consent:  Verbal consent obtained from patient. , Risks, benefits of treatment, and no treatment were discussed.  Patient's questions were elicited and answered.  and Written consent signed and scanned into medical record.    Indication: thickened " endometrium  Faculty: Federico    Using a medium Nora speculum, the cervix was visualized. The cervix was prepped with Betadine.  A single tooth tenaculum was applied to the anterior lip of the cervix. The endometrial pipelle was advanced through the cervix without difficulty and a sample collected. One additional pass was made.  The tenaculum was removed from the cervix and the tenaculum site made hemostatic with pressure.    EBL: scant  Complications:  None apparent, although scant sample  Pathology: EMB sample was sent to pathology.  Tolerance of Procedure:  Patient did tolerate the procedure well.     Patient was instructed to call if she experiences any heavy bleeding, severe cramping, or abnormal vaginal discharge.  May take ibuprofen 400-800 mg PO TID PRN or naproxen 500 mg PO BID for cramping.    Will notify patient of results.    Cyndee Caballero MD

## 2018-04-19 NOTE — MR AVS SNAPSHOT
"              After Visit Summary   4/19/2018    Cinthya Waite    MRN: [id number]           Patient Information     Date Of Birth          1955        Visit Information        Provider Department      4/19/2018 9:15 AM Cyndee Caballero MD [facility]        Today's Diagnoses     Thickened endometrium    -  1    Personal history of malignant neoplasm of breast           Follow-ups after your visit        Your next 10 appointments already scheduled     Oct 04, 2018  9:30 AM CDT   [facility] with  [facility]   [facility] ([facility])    [address]  [address]  [place] [address]   [phone]            Oct 04, 2018 10:00 AM CDT   (Arrive by 9:45 AM)   MA SCREENING BILATERAL W/ CADE with [id number]   [facility] ([facility])    [address]  [address]  [place] [address]   [phone]           Three-dimensional (3D) mammograms are available at [place] locations in [facility] [place], [place], [place], [facility], [place], [place], and [place]. [facility] locations include [place] and [facility] in [place]. Benefits of 3D mammograms include: - Improved rate of cancer detection - Decreases your chance of having to go back for more tests, which means fewer: - \"False-positive\" results (This means that there is an abnormal area but it isn't cancer.) - Invasive testing procedures, such as a biopsy or surgery - Can provide clearer images of the breast if you have dense breast tissue. 3D mammography is an optional exam that anyone can have with a 2D mammogram. It doesn't replace or take the place of a 2D mammogram. 2D mammograms remain an effective screening test for all women.  Not all insurance companies cover the cost of a 3D mammogram. Check with your insurance.            Oct 04, 2018 10:30 AM CDT   (Arrive by 10:15 AM)   Return Visit " "with Johnathan Herrera MD   [facility] ([facility])    [address]  [address]  [place] [address] [phone]              Future tests that were ordered for you today     Open Future Orders        Priority Expected Expires Ordered    CBC with platelets Routine  4/20/2019 4/20/2018            Who to contact     Please call your clinic at [phone] to:    Ask questions about your health    Make or cancel appointments    Discuss your medicines    Learn about your test results    Speak to your doctor            Additional Information About Your Visit        [employer] Information     [employer] gives you secure access to your electronic health record. If you see a primary care provider, you can also send messages to your care team and make appointments. If you have questions, please call your primary care clinic.  If you do not have a primary care provider, please call [phone] and they will assist you.      [employer] is an electronic gateway that provides easy, online access to your medical records. With [employer], you can request a clinic appointment, read your test results, renew a prescription or communicate with your care team.     To access your existing account, please contact your [facility] or call [phone] for assistance.        Care EveryWhere ID     This is your Care EveryWhere ID. This could be used by other organizations to access your [place] medical records  [id number]        Your Vitals Were     Pulse Height Last Period Breastfeeding? BMI (Body Mass Index)       76 1.626 m (5' 4\") 06/07/2007 No 31.41 kg/m2        Blood Pressure from Last 3 Encounters:   04/19/18 129/86   04/10/18 122/82   04/05/18 125/81    Weight from Last 3 Encounters:   04/19/18 83 kg (183 lb)   04/10/18 83.9 kg (185 lb)   04/05/18 83.1 kg (183 lb 4.8 oz)              We Performed the Following     ENDOMETRIAL BIOPSY w/o CERVICAL " DILATION     Domonique-Operative Worksheet (DaVinci Total Laparoscopic Hysterectomy with Bilateral Salpingo-Oophorectomy)     Surgical pathology exam        Primary Care Provider Office Phone # Fax #    Heidi Valencia [phone]       [facility] [address] [id number]        Equal Access to Services     ABELINO REYEZ : Hadii aad ku hadasho Soomaali, waaxda luqadaha, qaybta kaalmada adeegyada, waxay idiin hayaan adeeg kharash la'aan . So [facility] [phone].    ATENCIÓN: Si habla español, tiene a ames disposición servicios gratuitos de asistencia lingüística. Trevon al [phone].    We comply with applicable federal civil rights laws and [place] laws. We do not discriminate on the basis of race, color, national origin, age, disability, sex, sexual orientation, or gender identity.            Thank you!     Thank you for choosing [facility]  for your care. Our goal is always to provide you with excellent care. Hearing back from our patients is one way we can continue to improve our services. Please take a few minutes to complete the written survey that you may receive in the mail after your visit with us. Thank you!             Your Updated Medication List - Protect others around you: Learn how to safely use, store and throw away your medicines at www.disposemymeds.org.          This list is accurate as of 4/19/18 11:59 PM.  Always use your most recent med list.                   Brand Name Dispense Instructions for use Diagnosis    aspirin 81 MG tablet      Take 81 mg by mouth daily        BUPROBAN 150 MG 12 hr tablet   Generic drug:  buPROPion      Take 150 mg by mouth 2 times daily.        BUSPAR 10 MG tablet   Generic drug:  busPIRone      Take 10 mg by mouth daily.        Calcium Citrate 200 MG Tabs      Take 1 tablet by mouth daily        hydrochlorothiazide 25 MG tablet    HYDRODIURIL     Take 1 tablet by mouth daily.        levothyroxine 125  MCG tablet    SYNTHROID/LEVOTHROID     165 mcg daily        LIPITOR 40 MG tablet   Generic drug:  atorvastatin      Take 40 mg by mouth daily.        PROLIA SC           Vitamin D (Cholecalciferol) 1000 units Tabs      Take 2,000 Units by mouth daily

## 2018-04-20 ENCOUNTER — RECORDS - HEALTHEAST (OUTPATIENT)
Dept: ADMINISTRATIVE | Facility: OTHER | Age: 63
End: 2018-04-20

## 2018-04-20 RX ORDER — CEFAZOLIN SODIUM 1 G/3ML
1 INJECTION, POWDER, FOR SOLUTION INTRAMUSCULAR; INTRAVENOUS SEE ADMIN INSTRUCTIONS
Status: CANCELLED | OUTPATIENT
Start: 2018-04-20

## 2018-04-20 RX ORDER — PHENAZOPYRIDINE HYDROCHLORIDE 100 MG/1
200 TABLET, FILM COATED ORAL ONCE
Status: CANCELLED | OUTPATIENT
Start: 2018-04-20 | End: 2018-04-20

## 2018-04-20 RX ORDER — ACETAMINOPHEN 325 MG/1
975 TABLET ORAL ONCE
Status: CANCELLED | OUTPATIENT
Start: 2018-04-20 | End: 2018-04-20

## 2018-04-20 NOTE — PROGRESS NOTES
63 yo  with h/o Stage IIB ER+/LA+/HER2- left breast cancer with thickened endometrial stripe on ultrasound presents to discuss hysterectomy.  The patient has a friend with a similar medical history who went on to develop endometrial cancer.  This prompted her to see Dr. Wyman last week to review her risks.  A pelvic ultrasound showed and endometrial thickness of 7mm (done at [facility]).  She has had no vaginal bleeding.    The patient does have a history of postmenopausal bleeding and had a hysteroscopy D&C in .  Pathology from that surgery showed a benign endocervical polyp and benign atrophic endometrium.  She has not had bleeding since and is not currently on any hormonal affecting medications such as aromatase inhibitors or tamoxifen.  She has thought about the options Dr. Wyman present and she has decided she wishes to proceed with hysterectomy.      Patient denies other  symptoms.  She denies bladder symptoms or bowel symptoms.  Denies urinary/fecal incontinence.     ROS: 10 point ROS neg other than the symptoms noted above in the HPI.    Past Medical History:   Diagnosis Date     Abnormal Pap smear      Breast cancer (H) 2007    T1N0 left breast cancer     Depressive disorder, not elsewhere classified     Buspar, wellbutrin zoloft  sees Dr. Lim      Lichen sclerosus et atrophicus      Pure hypercholesterolemia     Lipitor     Raynaud's disease      Unspecified essential hypertension      Unspecified hypothyroidism     synthroid     Past Surgical History:   Procedure Laterality Date     ABDOMEN SURGERY      c- section x 2     APPENDECTOMY       BIOPSY       BREAST LUMPECTOMY, RT/LT  2007    Breast Lumpectomy /LT     C NONSPECIFIC PROCEDURE  10/82,     x2     C NONSPECIFIC PROCEDURE      Appendicitis     C NONSPECIFIC PROCEDURE      Hysteroscopy, D & C     COLONOSCOPY       DILATION AND CURETTAGE, OPERATIVE HYSTEROSCOPY WITH MORCELLATOR, COMBINED N/A  "10/28/2015    Procedure: COMBINED DILATION AND CURETTAGE, OPERATIVE HYSTEROSCOPY WITH MORCELLATOR;  Surgeon: Cyndee Wyman MD;  Location: [facility] OR     Family History   Problem Relation Age of Onset     HEART DISEASE Father      HEART DISEASE Sister      HEART DISEASE Brother      Neurologic Disorder Mother      migraines     Genitourinary Problems Sister      gallstones     Depression Sister      Depression Brother      Depression Sister      Depression Brother      DIABETES Father      DIABETES Brother      Hypertension Father      CEREBROVASCULAR DISEASE Father      Thyroid Disease Father      Breast Cancer Mother      Physical exam:  /86  Pulse 76  Ht 1.626 m (5' 4\")  Wt 83 kg (183 lb)  LMP 06/07/2007  Breastfeeding? No  BMI 31.41 kg/m2  Gen'l: appears well, no distress  CV: RRR  Lungs: CTA bilaterally  Abd: soft, NT, ND no palpable masses, well healed incisional scars  Vulva: lichenified appearance with agglutination of bilateral labia minora and clitoral valencia, introitus allow Nora speculum.  Urethra: normal  Vagina: pink, normal rugae, phsyiologic discharge present  Cervix: no lesion, no bleeding, small  Uterus: mobile, NT, midpositioned, normal in size  Adnexa: no palpable masses, NT  Rectum: Anus normal, no rectovaginal exam done    U/s: 3/30/18  Uterus 8.4 x 3.6 x 5.3 cm, 2 myomas: 0.8x1x1.1 cm left intramural, 1x1.4 x1.6 cm in upper uterus,  Endometrium 7mm, smooth  Right ov 3.1 x 2.2 x 3 cm, Left ov 2.6 x 2.1 x 2.8 cm    Assessment/Plan  61 yo with h/o ER/[place] + breast CA  Thickened endometrium in setting of no current PMB    - We discussed the risks and benefits of hysterectomy vs biopsy/observation.  Lining is thickened for post-menopausal status but not exceedingly so and given less than 11mm with no bleeding it may be reasonable to observe as well.  Patient is aware of her options and given her anxiety and her friend's experience she wishes to proceed with hysterectomy and she is " "hoping for a minimally invasive route.  - We discussed routes of hysterectomy and given prior surgical history including 2 c/s and our desire to remove both tubes and ovaries recommend robotic assisted laparoscopic approach.  Risk, benefits and alternatives of surgery including bleeding, infection, damage to nearby structures including but not limited to bladder, ureter, bowel, blood vessels and nerves were all reviewed with the patient.  We also reviewed the details of robotic surgery, the length of the procedure and the positioning required.  -Recommend EMB prior to surgery to evaluate for endometrial malignancy which would require onc referral and patient agreed today in clinic.  - Orders placed for RA-TLH/BSO. Patient agrees to removal of both tubes, ovaries and cervix.  - Patient aware will need pre-op exam with PCP.    Cyndee Caballero MD    Endometrial Biopsy                                                                       Time Out - \"Pause for the Cause\"  Just before the procedure begins, through verbal and active participation of team members, verify:    Initials   Patient Name    mip   Patient Date of Birth mip   Procedure to be performed  mip   Site, laterality, level or multiples, noting patient position   mip   Relevant images or diagnostics available/displayed   mip   Implants, special equipment or special requirements        mip     Consent:  Verbal consent obtained from patient. , Risks, benefits of treatment, and no treatment were discussed.  Patient's questions were elicited and answered.  and Written consent signed and scanned into medical record.    Indication: thickened endometrium  Faculty: Federico    Using a medium Nora speculum, the cervix was visualized. The cervix was prepped with Betadine.  A single tooth tenaculum was applied to the anterior lip of the cervix. The endometrial pipelle was advanced through the cervix without difficulty and a sample collected. One additional pass was " made.  The tenaculum was removed from the cervix and the tenaculum site made hemostatic with pressure.    EBL: scant  Complications:  None apparent, although scant sample  Pathology: EMB sample was sent to pathology.  Tolerance of Procedure:  Patient did tolerate the procedure well.     Patient was instructed to call if she experiences any heavy bleeding, severe cramping, or abnormal vaginal discharge.  May take ibuprofen 400-800 mg PO TID PRN or naproxen 500 mg PO BID for cramping.    Will notify patient of results.    Cyndee Caballero MD

## 2018-04-23 ENCOUNTER — COMMUNICATION - HEALTHEAST (OUTPATIENT)
Dept: INTERNAL MEDICINE | Facility: CLINIC | Age: 63
End: 2018-04-23

## 2018-04-23 ENCOUNTER — TELEPHONE (OUTPATIENT)
Dept: OBGYN | Facility: CLINIC | Age: 63
End: 2018-04-23

## 2018-04-23 NOTE — TELEPHONE ENCOUNTER
Confirmed surgery date with patient 8/3/18 with arrival time at 5:30a.m with nothing to eat eight hours before scheduled surgery time and clear liquids up to two hours before scheduled surgery time,informed patient that she will need to sched a pre op physical before surgery and that a map and letter will be mailed out.     to complete the following fields:            CHECKLIST     Google Calendar : Yes     Resident notified: Not Applicable     Clinic schedule blocked: Not Applicable    Patient notified:Yes      Pre op information sent: Yes     Given to patient over the phone.Yes    Comments:

## 2018-04-24 LAB — COPATH REPORT: NORMAL

## 2018-06-03 ENCOUNTER — COMMUNICATION - HEALTHEAST (OUTPATIENT)
Dept: INTERNAL MEDICINE | Facility: CLINIC | Age: 63
End: 2018-06-03

## 2018-06-03 DIAGNOSIS — I10 HYPERTENSION: ICD-10-CM

## 2018-06-26 ENCOUNTER — COMMUNICATION - HEALTHEAST (OUTPATIENT)
Dept: INTERNAL MEDICINE | Facility: CLINIC | Age: 63
End: 2018-06-26

## 2018-06-26 DIAGNOSIS — I10 ESSENTIAL HYPERTENSION: ICD-10-CM

## 2018-06-28 ENCOUNTER — MYC MEDICAL ADVICE (OUTPATIENT)
Dept: OBGYN | Facility: CLINIC | Age: 63
End: 2018-06-28

## 2018-07-06 ENCOUNTER — OFFICE VISIT - HEALTHEAST (OUTPATIENT)
Dept: INTERNAL MEDICINE | Facility: CLINIC | Age: 63
End: 2018-07-06

## 2018-07-06 DIAGNOSIS — N85.02 ENDOMETRIAL HYPERPLASIA WITH ATYPIA: ICD-10-CM

## 2018-07-06 DIAGNOSIS — C50.919 BREAST CANCER (H): ICD-10-CM

## 2018-07-06 DIAGNOSIS — Z12.31 VISIT FOR SCREENING MAMMOGRAM: ICD-10-CM

## 2018-07-06 DIAGNOSIS — Z01.818 PREOP GENERAL PHYSICAL EXAM: ICD-10-CM

## 2018-07-06 DIAGNOSIS — E03.9 ACQUIRED HYPOTHYROIDISM: ICD-10-CM

## 2018-07-06 LAB
ANION GAP SERPL CALCULATED.3IONS-SCNC: 10 MMOL/L (ref 5–18)
BUN SERPL-MCNC: 22 MG/DL (ref 8–22)
CALCIUM SERPL-MCNC: 9.9 MG/DL (ref 8.5–10.5)
CHLORIDE BLD-SCNC: 105 MMOL/L (ref 98–107)
CO2 SERPL-SCNC: 28 MMOL/L (ref 22–31)
CREAT SERPL-MCNC: 0.79 MG/DL (ref 0.6–1.1)
ERYTHROCYTE [DISTWIDTH] IN BLOOD BY AUTOMATED COUNT: 11.2 % (ref 11–14.5)
GFR SERPL CREATININE-BSD FRML MDRD: >60 ML/MIN/1.73M2
GLUCOSE BLD-MCNC: 84 MG/DL (ref 70–125)
HCT VFR BLD AUTO: 47.5 % (ref 35–47)
HGB BLD-MCNC: 16.1 G/DL (ref 12–16)
MCH RBC QN AUTO: 31.8 PG (ref 27–34)
MCHC RBC AUTO-ENTMCNC: 33.9 G/DL (ref 32–36)
MCV RBC AUTO: 94 FL (ref 80–100)
PLATELET # BLD AUTO: 248 THOU/UL (ref 140–440)
PMV BLD AUTO: 8.6 FL (ref 7–10)
POTASSIUM BLD-SCNC: 4 MMOL/L (ref 3.5–5)
RBC # BLD AUTO: 5.07 MILL/UL (ref 3.8–5.4)
SODIUM SERPL-SCNC: 143 MMOL/L (ref 136–145)
TSH SERPL DL<=0.005 MIU/L-ACNC: 0.44 UIU/ML (ref 0.3–5)
WBC: 6.9 THOU/UL (ref 4–11)

## 2018-07-06 ASSESSMENT — MIFFLIN-ST. JEOR: SCORE: 1335.34

## 2018-07-07 ENCOUNTER — COMMUNICATION - HEALTHEAST (OUTPATIENT)
Dept: INTERNAL MEDICINE | Facility: CLINIC | Age: 63
End: 2018-07-07

## 2018-08-02 ENCOUNTER — ANESTHESIA EVENT (OUTPATIENT)
Dept: SURGERY | Facility: CLINIC | Age: 63
End: 2018-08-02
Payer: COMMERCIAL

## 2018-08-03 ENCOUNTER — ANESTHESIA (OUTPATIENT)
Dept: SURGERY | Facility: CLINIC | Age: 63
End: 2018-08-03
Payer: COMMERCIAL

## 2018-08-03 ENCOUNTER — HOSPITAL ENCOUNTER (OUTPATIENT)
Facility: CLINIC | Age: 63
Discharge: HOME | End: 2018-08-04
Attending: OBSTETRICS & GYNECOLOGY | Admitting: OBSTETRICS & GYNECOLOGY
Payer: COMMERCIAL

## 2018-08-03 DIAGNOSIS — Z90.710 S/P LAPAROSCOPIC HYSTERECTOMY: Primary | ICD-10-CM

## 2018-08-03 LAB
ABO + RH BLD: NORMAL
ABO + RH BLD: NORMAL
BLD GP AB SCN SERPL QL: NORMAL
BLOOD BANK CMNT PATIENT-IMP: NORMAL
CREAT SERPL-MCNC: 0.86 MG/DL (ref 0.52–1.04)
GFR SERPL CREATININE-BSD FRML MDRD: 66 ML/MIN/1.7M2
GLUCOSE SERPL-MCNC: 93 MG/DL (ref 70–99)
HGB BLD-MCNC: 14.5 G/DL (ref 11.7–15.7)
INTERPRETATION ECG - MUSE: NORMAL
POTASSIUM SERPL-SCNC: 3.8 MMOL/L (ref 3.4–5.3)
SPECIMEN EXP DATE BLD: NORMAL

## 2018-08-03 PROCEDURE — 85018 HEMOGLOBIN: CPT | Performed by: ANESTHESIOLOGY

## 2018-08-03 PROCEDURE — 86900 BLOOD TYPING SEROLOGIC ABO: CPT | Performed by: OBSTETRICS & GYNECOLOGY

## 2018-08-03 PROCEDURE — 82947 ASSAY GLUCOSE BLOOD QUANT: CPT | Performed by: ANESTHESIOLOGY

## 2018-08-03 PROCEDURE — 82565 ASSAY OF CREATININE: CPT | Performed by: ANESTHESIOLOGY

## 2018-08-03 PROCEDURE — 27210794 ZZH OR GENERAL SUPPLY STERILE: Performed by: OBSTETRICS & GYNECOLOGY

## 2018-08-03 PROCEDURE — 25000125 ZZHC RX 250: Performed by: NURSE ANESTHETIST, CERTIFIED REGISTERED

## 2018-08-03 PROCEDURE — 25000132 ZZH RX MED GY IP 250 OP 250 PS 637: Performed by: OBSTETRICS & GYNECOLOGY

## 2018-08-03 PROCEDURE — C1765 ADHESION BARRIER: HCPCS | Performed by: OBSTETRICS & GYNECOLOGY

## 2018-08-03 PROCEDURE — 12000001 ZZH R&B MED SURG/OB UMMC

## 2018-08-03 PROCEDURE — 25000125 ZZHC RX 250: Performed by: OBSTETRICS & GYNECOLOGY

## 2018-08-03 PROCEDURE — 37000008 ZZH ANESTHESIA TECHNICAL FEE, 1ST 30 MIN: Performed by: OBSTETRICS & GYNECOLOGY

## 2018-08-03 PROCEDURE — 25000128 H RX IP 250 OP 636: Performed by: ANESTHESIOLOGY

## 2018-08-03 PROCEDURE — 71000014 ZZH RECOVERY PHASE 1 LEVEL 2 FIRST HR: Performed by: OBSTETRICS & GYNECOLOGY

## 2018-08-03 PROCEDURE — C9399 UNCLASSIFIED DRUGS OR BIOLOG: HCPCS | Performed by: NURSE ANESTHETIST, CERTIFIED REGISTERED

## 2018-08-03 PROCEDURE — 71000015 ZZH RECOVERY PHASE 1 LEVEL 2 EA ADDTL HR: Performed by: OBSTETRICS & GYNECOLOGY

## 2018-08-03 PROCEDURE — 25000128 H RX IP 250 OP 636: Performed by: STUDENT IN AN ORGANIZED HEALTH CARE EDUCATION/TRAINING PROGRAM

## 2018-08-03 PROCEDURE — 25000128 H RX IP 250 OP 636: Performed by: OBSTETRICS & GYNECOLOGY

## 2018-08-03 PROCEDURE — 86850 RBC ANTIBODY SCREEN: CPT | Performed by: OBSTETRICS & GYNECOLOGY

## 2018-08-03 PROCEDURE — 40000170 ZZH STATISTIC PRE-PROCEDURE ASSESSMENT II: Performed by: OBSTETRICS & GYNECOLOGY

## 2018-08-03 PROCEDURE — 36000086 ZZH SURGERY LEVEL 8 1ST 30 MIN UMMC: Performed by: OBSTETRICS & GYNECOLOGY

## 2018-08-03 PROCEDURE — 36415 COLL VENOUS BLD VENIPUNCTURE: CPT | Performed by: ANESTHESIOLOGY

## 2018-08-03 PROCEDURE — 88307 TISSUE EXAM BY PATHOLOGIST: CPT | Performed by: OBSTETRICS & GYNECOLOGY

## 2018-08-03 PROCEDURE — 88307 TISSUE EXAM BY PATHOLOGIST: CPT | Mod: 26 | Performed by: OBSTETRICS & GYNECOLOGY

## 2018-08-03 PROCEDURE — 25000128 H RX IP 250 OP 636: Performed by: NURSE ANESTHETIST, CERTIFIED REGISTERED

## 2018-08-03 PROCEDURE — C9290 INJ, BUPIVACAINE LIPOSOME: HCPCS | Performed by: ANESTHESIOLOGY

## 2018-08-03 PROCEDURE — 86901 BLOOD TYPING SEROLOGIC RH(D): CPT | Performed by: OBSTETRICS & GYNECOLOGY

## 2018-08-03 PROCEDURE — 84132 ASSAY OF SERUM POTASSIUM: CPT | Performed by: ANESTHESIOLOGY

## 2018-08-03 PROCEDURE — 25000566 ZZH SEVOFLURANE, EA 15 MIN: Performed by: OBSTETRICS & GYNECOLOGY

## 2018-08-03 PROCEDURE — 25000132 ZZH RX MED GY IP 250 OP 250 PS 637: Performed by: STUDENT IN AN ORGANIZED HEALTH CARE EDUCATION/TRAINING PROGRAM

## 2018-08-03 PROCEDURE — 93010 ELECTROCARDIOGRAM REPORT: CPT | Performed by: INTERNAL MEDICINE

## 2018-08-03 PROCEDURE — 40000065 ZZH STATISTIC EKG NON-CHARGEABLE

## 2018-08-03 PROCEDURE — 36000088 ZZH SURGERY LEVEL 8 EA 15 ADDTL MIN - UMMC: Performed by: OBSTETRICS & GYNECOLOGY

## 2018-08-03 PROCEDURE — 25000125 ZZHC RX 250: Performed by: ANESTHESIOLOGY

## 2018-08-03 PROCEDURE — 37000009 ZZH ANESTHESIA TECHNICAL FEE, EACH ADDTL 15 MIN: Performed by: OBSTETRICS & GYNECOLOGY

## 2018-08-03 RX ORDER — NALOXONE HYDROCHLORIDE 0.4 MG/ML
.1-.4 INJECTION, SOLUTION INTRAMUSCULAR; INTRAVENOUS; SUBCUTANEOUS
Status: DISCONTINUED | OUTPATIENT
Start: 2018-08-03 | End: 2018-08-03

## 2018-08-03 RX ORDER — PROPOFOL 10 MG/ML
INJECTION, EMULSION INTRAVENOUS PRN
Status: DISCONTINUED | OUTPATIENT
Start: 2018-08-03 | End: 2018-08-03

## 2018-08-03 RX ORDER — FENTANYL CITRATE 50 UG/ML
25-50 INJECTION, SOLUTION INTRAMUSCULAR; INTRAVENOUS
Status: DISCONTINUED | OUTPATIENT
Start: 2018-08-03 | End: 2018-08-03 | Stop reason: HOSPADM

## 2018-08-03 RX ORDER — OXYCODONE HYDROCHLORIDE 5 MG/1
5-10 TABLET ORAL
Status: DISCONTINUED | OUTPATIENT
Start: 2018-08-03 | End: 2018-08-04 | Stop reason: HOSPADM

## 2018-08-03 RX ORDER — BUPROPION HYDROCHLORIDE 150 MG/1
150 TABLET, FILM COATED, EXTENDED RELEASE ORAL DAILY
Status: DISCONTINUED | OUTPATIENT
Start: 2018-08-04 | End: 2018-08-04 | Stop reason: HOSPADM

## 2018-08-03 RX ORDER — LIDOCAINE HYDROCHLORIDE 20 MG/ML
INJECTION, SOLUTION INFILTRATION; PERINEURAL PRN
Status: DISCONTINUED | OUTPATIENT
Start: 2018-08-03 | End: 2018-08-03

## 2018-08-03 RX ORDER — ONDANSETRON 2 MG/ML
4 INJECTION INTRAMUSCULAR; INTRAVENOUS EVERY 6 HOURS PRN
Status: DISCONTINUED | OUTPATIENT
Start: 2018-08-03 | End: 2018-08-04 | Stop reason: HOSPADM

## 2018-08-03 RX ORDER — NALOXONE HYDROCHLORIDE 0.4 MG/ML
.1-.4 INJECTION, SOLUTION INTRAMUSCULAR; INTRAVENOUS; SUBCUTANEOUS
Status: DISCONTINUED | OUTPATIENT
Start: 2018-08-03 | End: 2018-08-04 | Stop reason: HOSPADM

## 2018-08-03 RX ORDER — ONDANSETRON 2 MG/ML
INJECTION INTRAMUSCULAR; INTRAVENOUS PRN
Status: DISCONTINUED | OUTPATIENT
Start: 2018-08-03 | End: 2018-08-03

## 2018-08-03 RX ORDER — PHENAZOPYRIDINE HYDROCHLORIDE 200 MG/1
200 TABLET, FILM COATED ORAL ONCE
Status: COMPLETED | OUTPATIENT
Start: 2018-08-03 | End: 2018-08-03

## 2018-08-03 RX ORDER — CEFAZOLIN SODIUM 2 G/100ML
2 INJECTION, SOLUTION INTRAVENOUS
Status: COMPLETED | OUTPATIENT
Start: 2018-08-03 | End: 2018-08-03

## 2018-08-03 RX ORDER — PROCHLORPERAZINE MALEATE 10 MG
10 TABLET ORAL EVERY 6 HOURS PRN
Status: DISCONTINUED | OUTPATIENT
Start: 2018-08-03 | End: 2018-08-04 | Stop reason: HOSPADM

## 2018-08-03 RX ORDER — AMOXICILLIN 250 MG
2 CAPSULE ORAL 2 TIMES DAILY
Status: DISCONTINUED | OUTPATIENT
Start: 2018-08-03 | End: 2018-08-04 | Stop reason: HOSPADM

## 2018-08-03 RX ORDER — DEXAMETHASONE SODIUM PHOSPHATE 4 MG/ML
INJECTION, SOLUTION INTRA-ARTICULAR; INTRALESIONAL; INTRAMUSCULAR; INTRAVENOUS; SOFT TISSUE PRN
Status: DISCONTINUED | OUTPATIENT
Start: 2018-08-03 | End: 2018-08-03

## 2018-08-03 RX ORDER — SODIUM CHLORIDE, SODIUM LACTATE, POTASSIUM CHLORIDE, CALCIUM CHLORIDE 600; 310; 30; 20 MG/100ML; MG/100ML; MG/100ML; MG/100ML
INJECTION, SOLUTION INTRAVENOUS CONTINUOUS
Status: DISCONTINUED | OUTPATIENT
Start: 2018-08-03 | End: 2018-08-04 | Stop reason: HOSPADM

## 2018-08-03 RX ORDER — HYDROMORPHONE HYDROCHLORIDE 1 MG/ML
.3-.5 INJECTION, SOLUTION INTRAMUSCULAR; INTRAVENOUS; SUBCUTANEOUS EVERY 5 MIN PRN
Status: DISCONTINUED | OUTPATIENT
Start: 2018-08-03 | End: 2018-08-03 | Stop reason: HOSPADM

## 2018-08-03 RX ORDER — DIPHENHYDRAMINE HCL 25 MG
25 CAPSULE ORAL EVERY 6 HOURS PRN
Status: DISCONTINUED | OUTPATIENT
Start: 2018-08-03 | End: 2018-08-04 | Stop reason: HOSPADM

## 2018-08-03 RX ORDER — ACETAMINOPHEN 325 MG/1
975 TABLET ORAL EVERY 8 HOURS
Status: DISCONTINUED | OUTPATIENT
Start: 2018-08-03 | End: 2018-08-04 | Stop reason: HOSPADM

## 2018-08-03 RX ORDER — METOCLOPRAMIDE HYDROCHLORIDE 5 MG/ML
10 INJECTION INTRAMUSCULAR; INTRAVENOUS EVERY 6 HOURS PRN
Status: DISCONTINUED | OUTPATIENT
Start: 2018-08-03 | End: 2018-08-04 | Stop reason: HOSPADM

## 2018-08-03 RX ORDER — DIPHENHYDRAMINE HYDROCHLORIDE 50 MG/ML
25 INJECTION INTRAMUSCULAR; INTRAVENOUS EVERY 6 HOURS PRN
Status: DISCONTINUED | OUTPATIENT
Start: 2018-08-03 | End: 2018-08-04 | Stop reason: HOSPADM

## 2018-08-03 RX ORDER — ONDANSETRON 4 MG/1
4 TABLET, ORALLY DISINTEGRATING ORAL EVERY 6 HOURS PRN
Status: DISCONTINUED | OUTPATIENT
Start: 2018-08-03 | End: 2018-08-04 | Stop reason: HOSPADM

## 2018-08-03 RX ORDER — FENTANYL CITRATE 50 UG/ML
25-50 INJECTION, SOLUTION INTRAMUSCULAR; INTRAVENOUS
Status: DISCONTINUED | OUTPATIENT
Start: 2018-08-03 | End: 2018-08-03

## 2018-08-03 RX ORDER — BUPIVACAINE HYDROCHLORIDE AND EPINEPHRINE 2.5; 5 MG/ML; UG/ML
INJECTION, SOLUTION INFILTRATION; PERINEURAL PRN
Status: DISCONTINUED | OUTPATIENT
Start: 2018-08-03 | End: 2018-08-03

## 2018-08-03 RX ORDER — ACETAMINOPHEN 325 MG/1
975 TABLET ORAL ONCE
Status: COMPLETED | OUTPATIENT
Start: 2018-08-03 | End: 2018-08-03

## 2018-08-03 RX ORDER — EPHEDRINE SULFATE 50 MG/ML
INJECTION, SOLUTION INTRAMUSCULAR; INTRAVENOUS; SUBCUTANEOUS PRN
Status: DISCONTINUED | OUTPATIENT
Start: 2018-08-03 | End: 2018-08-03

## 2018-08-03 RX ORDER — HYDROCHLOROTHIAZIDE 25 MG/1
25 TABLET ORAL DAILY
Status: DISCONTINUED | OUTPATIENT
Start: 2018-08-04 | End: 2018-08-04 | Stop reason: HOSPADM

## 2018-08-03 RX ORDER — AMOXICILLIN 250 MG
1 CAPSULE ORAL 2 TIMES DAILY
Status: DISCONTINUED | OUTPATIENT
Start: 2018-08-03 | End: 2018-08-04 | Stop reason: HOSPADM

## 2018-08-03 RX ORDER — FLUMAZENIL 0.1 MG/ML
0.2 INJECTION, SOLUTION INTRAVENOUS
Status: DISCONTINUED | OUTPATIENT
Start: 2018-08-03 | End: 2018-08-03

## 2018-08-03 RX ORDER — ONDANSETRON 4 MG/1
4 TABLET, ORALLY DISINTEGRATING ORAL EVERY 30 MIN PRN
Status: DISCONTINUED | OUTPATIENT
Start: 2018-08-03 | End: 2018-08-03 | Stop reason: HOSPADM

## 2018-08-03 RX ORDER — ACETAMINOPHEN 325 MG/1
650 TABLET ORAL EVERY 4 HOURS PRN
Status: DISCONTINUED | OUTPATIENT
Start: 2018-08-06 | End: 2018-08-04 | Stop reason: HOSPADM

## 2018-08-03 RX ORDER — BUPROPION HYDROCHLORIDE 150 MG/1
150 TABLET, FILM COATED, EXTENDED RELEASE ORAL ONCE
Status: DISCONTINUED | OUTPATIENT
Start: 2018-08-04 | End: 2018-08-03

## 2018-08-03 RX ORDER — LIDOCAINE 40 MG/G
CREAM TOPICAL
Status: DISCONTINUED | OUTPATIENT
Start: 2018-08-03 | End: 2018-08-03

## 2018-08-03 RX ORDER — LIDOCAINE 40 MG/G
CREAM TOPICAL
Status: DISCONTINUED | OUTPATIENT
Start: 2018-08-03 | End: 2018-08-04 | Stop reason: HOSPADM

## 2018-08-03 RX ORDER — CEFAZOLIN SODIUM 1 G/3ML
1 INJECTION, POWDER, FOR SOLUTION INTRAMUSCULAR; INTRAVENOUS SEE ADMIN INSTRUCTIONS
Status: DISCONTINUED | OUTPATIENT
Start: 2018-08-03 | End: 2018-08-03

## 2018-08-03 RX ORDER — ONDANSETRON 2 MG/ML
4 INJECTION INTRAMUSCULAR; INTRAVENOUS EVERY 30 MIN PRN
Status: DISCONTINUED | OUTPATIENT
Start: 2018-08-03 | End: 2018-08-03 | Stop reason: HOSPADM

## 2018-08-03 RX ORDER — SODIUM CHLORIDE, SODIUM LACTATE, POTASSIUM CHLORIDE, CALCIUM CHLORIDE 600; 310; 30; 20 MG/100ML; MG/100ML; MG/100ML; MG/100ML
INJECTION, SOLUTION INTRAVENOUS CONTINUOUS
Status: DISCONTINUED | OUTPATIENT
Start: 2018-08-03 | End: 2018-08-03 | Stop reason: HOSPADM

## 2018-08-03 RX ORDER — METOCLOPRAMIDE 10 MG/1
10 TABLET ORAL EVERY 6 HOURS PRN
Status: DISCONTINUED | OUTPATIENT
Start: 2018-08-03 | End: 2018-08-04 | Stop reason: HOSPADM

## 2018-08-03 RX ORDER — FENTANYL CITRATE 50 UG/ML
INJECTION, SOLUTION INTRAMUSCULAR; INTRAVENOUS PRN
Status: DISCONTINUED | OUTPATIENT
Start: 2018-08-03 | End: 2018-08-03

## 2018-08-03 RX ADMIN — HYDROMORPHONE HYDROCHLORIDE 0.5 MG: 1 INJECTION, SOLUTION INTRAMUSCULAR; INTRAVENOUS; SUBCUTANEOUS at 11:26

## 2018-08-03 RX ADMIN — PROPOFOL 200 MG: 10 INJECTION, EMULSION INTRAVENOUS at 07:40

## 2018-08-03 RX ADMIN — SODIUM CHLORIDE, POTASSIUM CHLORIDE, SODIUM LACTATE AND CALCIUM CHLORIDE: 600; 310; 30; 20 INJECTION, SOLUTION INTRAVENOUS at 18:03

## 2018-08-03 RX ADMIN — FENTANYL CITRATE 50 MCG: 50 INJECTION, SOLUTION INTRAMUSCULAR; INTRAVENOUS at 08:25

## 2018-08-03 RX ADMIN — HYDROMORPHONE HYDROCHLORIDE 0.2 MG: 1 INJECTION, SOLUTION INTRAMUSCULAR; INTRAVENOUS; SUBCUTANEOUS at 12:55

## 2018-08-03 RX ADMIN — CEFAZOLIN SODIUM 2 G: 2 INJECTION, SOLUTION INTRAVENOUS at 08:17

## 2018-08-03 RX ADMIN — ROCURONIUM BROMIDE 5 MG: 10 INJECTION INTRAVENOUS at 07:42

## 2018-08-03 RX ADMIN — ACETAMINOPHEN 975 MG: 325 TABLET, FILM COATED ORAL at 16:41

## 2018-08-03 RX ADMIN — ACETAMINOPHEN 975 MG: 325 TABLET, FILM COATED ORAL at 22:19

## 2018-08-03 RX ADMIN — FENTANYL CITRATE 50 MCG: 50 INJECTION, SOLUTION INTRAMUSCULAR; INTRAVENOUS at 07:41

## 2018-08-03 RX ADMIN — ROCURONIUM BROMIDE 40 MG: 10 INJECTION INTRAVENOUS at 08:55

## 2018-08-03 RX ADMIN — MIDAZOLAM 1 MG: 1 INJECTION INTRAMUSCULAR; INTRAVENOUS at 07:03

## 2018-08-03 RX ADMIN — CEFAZOLIN SODIUM 1 G: 2 INJECTION, SOLUTION INTRAVENOUS at 10:20

## 2018-08-03 RX ADMIN — OXYCODONE HYDROCHLORIDE 5 MG: 5 TABLET ORAL at 20:02

## 2018-08-03 RX ADMIN — FENTANYL CITRATE 50 MCG: 50 INJECTION, SOLUTION INTRAMUSCULAR; INTRAVENOUS at 08:57

## 2018-08-03 RX ADMIN — Medication 10 MG: at 09:33

## 2018-08-03 RX ADMIN — LIDOCAINE HYDROCHLORIDE 100 MG: 20 INJECTION, SOLUTION INFILTRATION; PERINEURAL at 07:40

## 2018-08-03 RX ADMIN — ROCURONIUM BROMIDE 30 MG: 10 INJECTION INTRAVENOUS at 10:19

## 2018-08-03 RX ADMIN — BUPIVACAINE 20 ML: 13.3 INJECTION, SUSPENSION, LIPOSOMAL INFILTRATION at 07:05

## 2018-08-03 RX ADMIN — SUGAMMADEX 170 MG: 100 INJECTION, SOLUTION INTRAVENOUS at 13:05

## 2018-08-03 RX ADMIN — SENNOSIDES AND DOCUSATE SODIUM 2 TABLET: 8.6; 5 TABLET ORAL at 20:02

## 2018-08-03 RX ADMIN — ROCURONIUM BROMIDE 20 MG: 10 INJECTION INTRAVENOUS at 10:54

## 2018-08-03 RX ADMIN — PHENYLEPHRINE HYDROCHLORIDE 100 MCG: 10 INJECTION, SOLUTION INTRAMUSCULAR; INTRAVENOUS; SUBCUTANEOUS at 09:33

## 2018-08-03 RX ADMIN — ROCURONIUM BROMIDE 10 MG: 10 INJECTION INTRAVENOUS at 12:28

## 2018-08-03 RX ADMIN — Medication 10 MG: at 08:41

## 2018-08-03 RX ADMIN — FENTANYL CITRATE 50 MCG: 50 INJECTION, SOLUTION INTRAMUSCULAR; INTRAVENOUS at 11:30

## 2018-08-03 RX ADMIN — ROCURONIUM BROMIDE 25 MG: 10 INJECTION INTRAVENOUS at 08:21

## 2018-08-03 RX ADMIN — CEFAZOLIN SODIUM 1 G: 2 INJECTION, SOLUTION INTRAVENOUS at 12:20

## 2018-08-03 RX ADMIN — FENTANYL CITRATE 50 MCG: 50 INJECTION, SOLUTION INTRAMUSCULAR; INTRAVENOUS at 08:22

## 2018-08-03 RX ADMIN — PROCHLORPERAZINE EDISYLATE 10 MG: 5 INJECTION INTRAMUSCULAR; INTRAVENOUS at 13:45

## 2018-08-03 RX ADMIN — FENTANYL CITRATE 50 MCG: 50 INJECTION, SOLUTION INTRAMUSCULAR; INTRAVENOUS at 07:03

## 2018-08-03 RX ADMIN — Medication 100 MG: at 07:43

## 2018-08-03 RX ADMIN — PHENAZOPYRIDINE HYDROCHLORIDE 200 MG: 200 TABLET, FILM COATED ORAL at 06:26

## 2018-08-03 RX ADMIN — SODIUM CHLORIDE, POTASSIUM CHLORIDE, SODIUM LACTATE AND CALCIUM CHLORIDE: 600; 310; 30; 20 INJECTION, SOLUTION INTRAVENOUS at 10:05

## 2018-08-03 RX ADMIN — DEXAMETHASONE SODIUM PHOSPHATE 4 MG: 4 INJECTION, SOLUTION INTRAMUSCULAR; INTRAVENOUS at 08:15

## 2018-08-03 RX ADMIN — ACETAMINOPHEN 975 MG: 325 TABLET, FILM COATED ORAL at 06:26

## 2018-08-03 RX ADMIN — BUPIVACAINE HYDROCHLORIDE AND EPINEPHRINE BITARTRATE 20 ML: 2.5; .005 INJECTION, SOLUTION INFILTRATION; PERINEURAL at 07:05

## 2018-08-03 RX ADMIN — ONDANSETRON 4 MG: 2 INJECTION INTRAMUSCULAR; INTRAVENOUS at 12:40

## 2018-08-03 RX ADMIN — SODIUM CHLORIDE, POTASSIUM CHLORIDE, SODIUM LACTATE AND CALCIUM CHLORIDE: 600; 310; 30; 20 INJECTION, SOLUTION INTRAVENOUS at 07:34

## 2018-08-03 RX ADMIN — FENTANYL CITRATE 50 MCG: 50 INJECTION, SOLUTION INTRAMUSCULAR; INTRAVENOUS at 13:50

## 2018-08-03 ASSESSMENT — ACTIVITIES OF DAILY LIVING (ADL)
TRANSFERRING: 0-->INDEPENDENT
DRESS: 0-->INDEPENDENT
SWALLOWING: 0-->SWALLOWS FOODS/LIQUIDS WITHOUT DIFFICULTY
BATHING: 0-->INDEPENDENT
RETIRED_EATING: 0-->INDEPENDENT
COGNITION: 0 - NO COGNITION ISSUES REPORTED
AMBULATION: 0-->INDEPENDENT
ADLS_ACUITY_SCORE: 10
RETIRED_COMMUNICATION: 0-->UNDERSTANDS/COMMUNICATES WITHOUT DIFFICULTY
FALL_HISTORY_WITHIN_LAST_SIX_MONTHS: YES
NUMBER_OF_TIMES_PATIENT_HAS_FALLEN_WITHIN_LAST_SIX_MONTHS: 1
TOILETING: 0-->INDEPENDENT

## 2018-08-03 NOTE — OR NURSING
PACU to Inpatient Nursing Handoff    Patient Cinthya Waite is a 62 year old female who speaks English.   Procedure Procedure(s):  Davinci Total Laparoscopic Hysterectomy, Bilateral Salpingo Oophorectomy, and Cystoscopy  - Wound Class: I-Clean   - Wound Class: II-Clean Contaminated   Surgeon(s) Primary: Cyndee Caballero MD  Resident - Assisting: Lilia Vargas MD     Allergies   Allergen Reactions     Diclofenac Unknown     Elevation of LFTs.  Elevation of LFTs.       Isolation  [unfilled]     Past Medical History   has a past medical history of Abnormal Pap smear; Breast cancer (H) (7/2007); Depressive disorder, not elsewhere classified; Lichen sclerosus et atrophicus; Pure hypercholesterolemia; Raynaud's disease; Unspecified essential hypertension; and Unspecified hypothyroidism.    Anesthesia Combined General with Block   Dermatome Level     Preop Meds acetaminophen (Tylenol) - time given: 0626, pyridium 200mg @ 0630   Nerve block Transversus abdominus plane (TAP).  Location:bilateral. Med:Exparel (liposomal bupivacaine). Time given: 0735   Intraop Meds dexamethasone (Decadron)  fentanyl (Sublimaze): 250 mcg total  hydromorphone (Dilaudid): 0.7 mg total  ondansetron (Zofran): last given at 1240   Local Meds Yes   Antibiotics cefazolin (Ancef) - last given at 1220     Pain Patient Currently in Pain: yes  Comfort: tolerable with discomfort   PACU meds  fentanyl (Sublimaze): 50 mcg (total dose) last given at 1350 , compazine 10mg @ 1345 for nausea   PCA / epidural No   Capnography  yes   Telemetry ECG Rhythm: Left bundle branch block   Inpatient Telemetry Monitor Ordered? No        Labs Glucose Lab Results   Component Value Date    GLC 93 08/03/2018       Hgb Lab Results   Component Value Date    HGB 14.5 08/03/2018       INR Lab Results   Component Value Date    INR 1.03 11/30/2007      PACU Imaging Not applicable     Wound/Incision Incision/Surgical Site 08/03/18 Bilateral Abdomen (Active)    Incision Assessment [facility] 8/3/2018  1:52 PM   Closure Liquid bandage 8/3/2018  1:52 PM   Dressing Intervention Open to air / No Dressing 8/3/2018  1:52 PM   Number of days:0       Packing 08/03/18 Vagina Qty Placed: 1 (Active)   Packing Assessment UTV 8/3/2018  1:52 PM   Number of days:0      CMS Peripheral Neurovascular WDL: WDL (08/03/18 0622)  All Extremities Temperature: warm (08/03/18 1323)  All Extremities Color: no discoloration (08/03/18 1323)  All Extremities Sensation: no numbness;no tingling (08/03/18 1323)   Equipment Abdominal binder   Other LDA       IV Access Peripheral IV 08/03/18 Right Hand (Active)   Site Assessment [facility] 8/3/2018  1:56 PM   Line Status Infusing 8/3/2018  1:23 PM   Phlebitis Scale 0-->no symptoms 8/3/2018  1:23 PM   Infiltration Scale 0 8/3/2018  1:23 PM   Number of days:0       Peripheral IV 08/03/18 Left Lower forearm (Active)   Site Assessment [facility] 8/3/2018  1:56 PM   Line Status Infusing 8/3/2018  1:23 PM   Phlebitis Scale 0-->no symptoms 8/3/2018  1:23 PM   Infiltration Scale 0 8/3/2018  1:23 PM   Number of days:0      Blood Products Not applicable  mL   Intake/Output Date 08/03/18 0700 - 08/04/18 0659   Shift 4770-9283 7414-5521 6080-1231 24 Hour Total   I  N  T  A  K  E   I.V. 1800   1800    Shift Total  (mL/kg) 1800  (21.56)   1800  (21.56)   O  U  T  P  U  T   Urine 500   500    Blood 400   400    Shift Total  (mL/kg) 900  (10.78)   900  (10.78)   Weight (kg) 83.5 83.5 83.5 83.5        Drains / Trpip Urethral Catheter Latex;Straight-tip (Active)   Number of days:0      Time of void PreOp Void Prior to Procedure: 0615 (08/03/18 0622)    PostOp      Diapered? No   Bladder Scan  tripp   PO    tolerating sips     Vitals    B/P: 121/65  T: 98.1  F (36.7  C)    Temp src: Axillary  P:  Pulse: 74 (08/03/18 0559)    Heart Rate: 83 (08/03/18 1400)     R: 13  O2:  SpO2: 97 %    O2 Device: Simple face mask (08/03/18 1400)    Oxygen Delivery: 6 LPM (08/03/18 1400)          Family/support present significant other   Patient belongings Patient Belongings: clothing;shoes  Disposition of Belongings: Other (see comment) (labeled and secured)   Patient transported on cart   DC meds/scripts (obs/outpt) Not applicable   Inpatient Pain Meds Released? Yes       Special needs/considerations 4 lap sites glued in lower abdomen, Vaginal packing.   Tasks needing completion abdominal binder ordered       Michael Marc RN  ASCOM [id number]

## 2018-08-03 NOTE — ANESTHESIA PROCEDURE NOTES
Peripheral Nerve Block Procedure Note    Staff:     Anesthesiologist:  JAYCEE DEMPSEY    Resident/CRNA:  ERICKSON LINDSEY    Block performed by resident/CRNA in the presence of a teaching physician    Location: Pre-op  Procedure Start/Stop TImes:      8/3/2018 6:50 AM     8/3/2018 7:05 AM    patient identified, IV checked, site marked, risks and benefits discussed, informed consent, monitors and equipment checked, pre-op evaluation, at physician/surgeon's request and post-op pain management      Correct Patient: Yes      Correct Position: Yes      Correct Site: Yes      Correct Procedure: Yes      Correct Laterality:  Yes    Site Marked:  Yes  Procedure details:     Procedure:  TAP    ASA:  3    Diagnosis:  Lap oopherectomy, salpingectomy    Laterality:  Bilateral    Position:  Supine    Sterile Prep: chloraprep, mask and sterile gloves      Local skin infiltration:  2% lidocaine    amount (mL):  3    Needle type: tap needle.    Needle gauge:  21    Needle length (inches):  4    Ultrasound: Yes      Ultrasound used to identify targeted nerve, plexus, or vascular structure and placed a needle adjacent to it      Permanent Image entered into patiient's record      Abnormal pain on injection: No      Blood Aspirated: No      Paresthesias:  No    Bleeding at site: No      Test dose negative for signs of intravascular injection: Yes      Bolus via:  Needle    Infusion Method:  Single Shot    Complications:  None  Assessment/Narrative:     Injection made incrementally with aspirations every (mL):  5

## 2018-08-03 NOTE — BRIEF OP NOTE
Brief Operative Note   Name: Cinthya Waite  MRN: [id number]  : 1955  Date of Surgery: 2018    Pre-operative Diagnosis:   Postmenopausal bleeding, benign biopsy  History of ER/[place] positive breast cancer   Post-operative Diagnosis: Same  Procedure(s): Da Citlali assisted robotic hysterectomy, bilateral salpingoophorectomy, cystoscopy, vaginal packimng    Surgeon: Cyndee Caballero MD   Assistants: Lilia Vargas MD PGY1, Berkley Farias MD PGY4, Farzana Deras MS3    Anesthesia: GETA, preoperative TAPS block  EBL: 400 mL   Urine Output: 500 mL clear urine   Fluids: 1800 mL crystalloid  Specimens: Uterus, cervix, bilateral fallopian tubes and ovaries  Complications: None apparent.  Findings:  Obese abdomen with subumbilical vertical midline incision. Vagina is atrophic with signs of lichen sclerosus. Introitus and vagina tight with friable tissue; later at exam bleeding tissue aty stretch sites. After abdominal entry, minimal abdominal adhesions other than left adnexa to descending colon, not obstructing operating field. Right ureter seen to peristalse deep in the pelvis. The left ureter was not seen transperitoneally. The uterus appeared normal other than a 2 cm subserosal left fundal fibroid. The ovaries appeared normal as did the fallopian tubes. On cystoscopy, both ureteral jets seen and no noted bladder injury.       Berkley Farias  2018, 1:04 PM

## 2018-08-03 NOTE — IP AVS SNAPSHOT
[facility] [address]    [address]    [place] [address]    Phone:  [phone]                                       After Visit Summary   8/3/2018    Cinthya Waite    MRN: [id number]           After Visit Summary Signature Page     I have received my discharge instructions, and my questions have been answered. I have discussed any challenges I see with this plan with the nurse or doctor.    ..........................................................................................................................................  Patient/Patient Representative Signature      ..........................................................................................................................................  Patient Representative Print Name and Relationship to Patient    ..................................................               ................................................  Date                                            Time    ..........................................................................................................................................  Reviewed by Signature/Title    ...................................................              ..............................................  Date                                                            Time

## 2018-08-03 NOTE — ANESTHESIA POSTPROCEDURE EVALUATION
Patient: Cinthya Waite    Procedure(s):  Davinci Total Laparoscopic Hysterectomy, Bilateral Salpingo Oophorectomy, and Cystoscopy  - Wound Class: I-Clean   - Wound Class: II-Clean Contaminated    Diagnosis:Thickened Endometrium, History Of Breast Cancer, Post Menopausal Women   Diagnosis Additional Information: No value filed.    Anesthesia Type:  General, ETT    Note:  Anesthesia Post Evaluation    Patient location during evaluation: PACU  Patient participation: Able to fully participate in evaluation  Level of consciousness: awake and alert  Pain management: adequate  Airway patency: patent  Cardiovascular status: acceptable  Respiratory status: acceptable  Hydration status: acceptable  PONV: none     Anesthetic complications: None          Last vitals:  Vitals:    08/03/18 1430 08/03/18 1445 08/03/18 1500   BP: 129/76 132/78    Pulse:      Resp: 12 12 8   Temp:      SpO2: 98% 95% 96%         Electronically Signed By: Grecia Roy MD  August 3, 2018  3:01 PM

## 2018-08-03 NOTE — DISCHARGE SUMMARY
Discharge Summary    Cinthya Waite MRN# [id number]   YOB: 1955 Age: 62 year old     Date of Admission:  8/3/2018  Date of Discharge:  ***  Admitting Physician:  Cyndee Caballero MD  Discharge Physician:  ***  Discharging Service:  Gynecology***     Primary Provider: Heidi Valencia          Admission Diagnoses:   Postmenopausal bleeding, benign biopsy  History of ER/CA positive breast cancer           Discharge Diagnosis:     ***         Discharge Disposition:     ***Home         Procedures:      Da Citlali assisted robotic hysterectomy, bilateral salpingoophorectomy, cystoscopy, vaginal packing placement    ***removal of vaginal packing          Medications Prior to Admission:     Prescriptions Prior to Admission   Medication Sig Dispense Refill Last Dose     aspirin 81 MG tablet Take 81 mg by mouth daily   Past Month at Unknown time     atorvastatin (LIPITOR) 40 MG tablet Take 40 mg by mouth daily.    8/3/2018 at 0500     buPROPion (BUPROBAN) 150 MG 12 hr tablet Take 150 mg by mouth once    8/3/2018 at 0500     busPIRone (BUSPAR) 10 MG tablet Take 10 mg by mouth daily.   8/3/2018 at 0500     Calcium Citrate 200 MG TABS Take 1 tablet by mouth daily   8/1/2018     Denosumab (PROLIA SC)    6/1/2018     hydrochlorothiazide (HYDRODIURIL) 25 MG tablet Take 1 tablet by mouth daily.   8/3/2018 at 0500     levothyroxine (SYNTHROID, LEVOTHROID) 125 MCG tablet 165 mcg daily    8/3/2018 at 0500     Vitamin D, Cholecalciferol, 1000 UNITS TABS Take 2,000 Units by mouth daily   8/1/2018             Discharge Medications:   {                  :[id number]}          Consultations:     Preoperative anesthesia          Brief History of Illness:     Ms Waite is a 62 year old P2with history of ER/CA+ breast cancer s/p Tamoxifen. After multiples episodes of PMB with a thickened stripe, despite negative biopsies, she elected to undergo hysterectomy for definitive management. Robot assisted laparoscopic hysterectomy  "was recommended. After full discussion of risks and benefits the patient consented to the procedure.           Hospital Course:     Procedure was complicated by bleeding from friable vaginal tissue, for which vaginal packing was placed. This was removed on POD#***. A Pastrana catheter was kept in place and also removed ***.     DC Hemoglobin was stable at ***          Discharge Instructions and Follow-Up:   Discharge diet: { :6852251::\"Regular\"}   Discharge activity: { :2539776::\"Activity as tolerated\"}   Discharge follow-up: {:4018139}   Other instructions: { :0090747::\"None\"}      ***sign date meds    "

## 2018-08-03 NOTE — IP AVS SNAPSHOT
MRN:[id number]                      After Visit Summary   8/3/2018    Cinthya Waite    MRN: [id number]           Thank you!     Thank you for choosing [facility] for your care. Our goal is always to provide you with excellent care. Hearing back from our patients is one way we can continue to improve our services. Please take a few minutes to complete the written survey that you may receive in the mail after you visit with us. Thank you!        Patient Information     Date Of Birth          1955        Designated Caregiver       Most Recent Value    Caregiver    Will someone help with your care after discharge? yes    Name of designated caregiver Parth    Phone number of caregiver [phone]    Caregiver address same at Pt      About your hospital stay     You were admitted on:  August 3, 2018 You last received care in the:  [facility]    You were discharged on:  August 4, 2018        Reason for your hospital stay       Hysterectomy                  Who to Call     For medical emergencies, please call 911.  For non-urgent questions about your medical care, please call your primary care provider or clinic, [phone]  For questions related to your surgery, please call your surgery clinic        Attending Provider     Provider Specialty    Cyndee Caballero MD OB/Gyn       Primary Care Provider Office Phone # Fax #    Heidi Valencia [phone]       When to contact your care team       Call your primary doctor if you have any of the following: temperature greater than 100.4,  increased shortness of breath, increased drainage or increased swelling, vaginal bleeding.                  After Care Instructions     Activity       Your activity upon discharge: activity as tolerated            Diet       Follow this diet upon discharge: Orders Placed This Encounter      Advance Diet as Tolerated: Regular Diet Adult            Wound care and dressings       Instructions to  "care for your wound at home: ice to area for comfort and keep wound clean and dry.                  Follow-up Appointments     Follow Up ([facility])       Follow up with ob/gyn in 2-4 weeks for post-op follow-up.    Appointments on [place] and/or [facility] (with [facility] or [facility] provider or service). Call [phone] if you haven't heard regarding these appointments within 7 days of discharge.                  Your next 10 appointments already scheduled     Oct 04, 2018  9:30 AM CDT   [facility] Draw with  [facility]   [facility] ([facility])    [address]  [address]  [facility] [id number] [phone]            Oct 04, 2018 10:00 AM CDT   MA SCREENING BILATERAL W/ CADE with [id number]   [facility] ([facility])    [address]  [facility] [id number] [phone]           Three-dimensional (3D) mammograms are available at [facility] locations in [facility], [place], [facility], [facility], and [place]. [facility] locations include [facility] in [place]. Benefits of 3D mammograms include: - Improved rate of cancer detection - Decreases your chance of having to go back for more tests, which means fewer: - \"False-positive\" results (This means that there is an abnormal area but it isn't cancer.) - Invasive testing procedures, such as a biopsy or surgery - Can provide clearer images of the breast if you have dense breast tissue. 3D mammography is an optional exam that anyone can have with a 2D mammogram. It doesn't replace or take the place of a 2D mammogram. 2D mammograms remain an effective screening test for all women.  Not all insurance companies cover the cost of a 3D mammogram. Check with your insurance.            Oct 04, 2018 10:30 AM CDT   (Arrive by 10:15 AM)   Return Visit with Johnathan Herrera MD   [facility] " "[facility] ([facility])    [address]  [address]  [facility] [address] [phone]              Additional Information     If you use hormonal birth control (such as the pill, patch, ring or implants): You'll need a second form of birth control for 7 days (condoms, a diaphragm or contraceptive foam). While in the hospital, you received a medicine called Bridion. Your normal birth control will not work as well for a week after taking this medicine.          Pending Results     Date and Time Order Name Status Description    8/3/2018 1150 Surgical pathology exam In process             Statement of Approval     Ordered          08/04/18 1117  I have reviewed and agree with all the recommendations and orders detailed in this document.  EFFECTIVE NOW     Approved and electronically signed by:  Cyndee Caballero MD             Admission Information     Date & Time Provider Department Dept. Phone    8/3/2018 Cyndee Caballero MD [facility] [phone]      Your Vitals Were     Blood Pressure Pulse Temperature Respirations Height Weight    114/56 91 98.3  F (36.8  C) (Oral) 18 1.626 m (5' 4\") 83.5 kg (184 lb 1.4 oz)    Last Period Pulse Oximetry BMI (Body Mass Index)             06/07/2007 95% 31.6 kg/m2         MyChart Information     [facility] gives you secure access to your electronic health record. If you see a primary care provider, you can also send messages to your care team and make appointments. If you have questions, please call your primary care clinic.  If you do not have a primary care provider, please call [phone] and they will assist you.        Care EveryWhere ID     This is your Care EveryWhere ID. This could be used by other organizations to access your [facility] medical records  [id number]        Equal Access to Services     ABELINO REYEZ AH: abram Godoy, veda hansen " lareal ahKarli [facility] [phone].    ATENCIÓN: Si habla rick, tiene a ames disposición servicios gratuitos de asistencia lingüística. Trevon al [phone].    We comply with applicable federal civil rights laws and [place] laws. We do not discriminate on the basis of race, color, national origin, age, disability, sex, sexual orientation, or gender identity.               Review of your medicines      START taking        Dose / Directions    acetaminophen 325 MG tablet   Commonly known as:  TYLENOL        Dose:  975 mg   Take 3 tablets (975 mg) by mouth every 6 hours as needed for mild pain   Quantity:  30 tablet   Refills:  1       oxyCODONE IR 5 MG tablet   Commonly known as:  ROXICODONE        Dose:  5 mg   Take 1 tablet (5 mg) by mouth every 6 hours as needed for other (pain control or improvement in physical function. Hold dose for analgesic side effects.)   Quantity:  15 tablet   Refills:  0         CONTINUE these medicines which have NOT CHANGED        Dose / Directions    aspirin 81 MG tablet        Dose:  81 mg   Take 81 mg by mouth daily   Refills:  0       BUPROBAN 150 MG 12 hr tablet   Generic drug:  buPROPion        Dose:  150 mg   Take 150 mg by mouth once   Refills:  0       BUSPAR 10 MG tablet   Generic drug:  busPIRone        Dose:  10 mg   Take 10 mg by mouth daily.   Refills:  0       Calcium Citrate 200 MG Tabs        Dose:  1 tablet   Take 1 tablet by mouth daily   Refills:  0       hydrochlorothiazide 25 MG tablet   Commonly known as:  HYDRODIURIL        Dose:  1 tablet   Take 1 tablet by mouth daily.   Refills:  0       levothyroxine 125 MCG tablet   Commonly known as:  SYNTHROID/LEVOTHROID        Dose:  165 mcg   165 mcg daily   Refills:  0       LIPITOR 40 MG tablet   Generic drug:  atorvastatin        Dose:  40 mg   Take 40 mg by mouth daily.   Refills:  0       PROLIA SC        Refills:  0       Vitamin D (Cholecalciferol) 1000 units Tabs        Dose:  2000 Units   Take 2,000 Units by  "mouth daily   Refills:  0            Where to get your medicines      These medications were sent to [facility] [place], [place] - [address]  [address], [facility] [address]     Phone:  [phone]     acetaminophen 325 MG tablet         Some of these will need a paper prescription and others can be bought over the counter. Ask your nurse if you have questions.     Bring a paper prescription for each of these medications     oxyCODONE IR 5 MG tablet                Protect others around you: Learn how to safely use, store and throw away your medicines at www.disposemymeds.org.        Information about your nerve block     Today you received a block to numb the nerves near your surgery site.    This is a block using local anesthetic or \"numbing\" medication injected around the nerves to anesthetize or \"numb\" the area supplied by those nerves. This block is injected into the muscle layer near your surgical site. The type of anesthesia (Exparel) your anesthesia team used to numb your abdomen may give you relief for up to 72 hours.     Diet: There are no diet restrictions, but you should drink plenty of fluids, unless you are on a fluid-restricted diet.     Activity: If your surgical site is an arm or leg you should be careful with your affected limb, since it is possible to injure your limb without being aware of it due to the numbing. Until full feeling returns, you should guard against bumping or hitting your limb, and avoid extreme hot or cold temperatures on the skin.    Pain Medication: As the block wears off, the feeling will return as a tingling or prickly sensation near your surgical site. You will experience more discomfort from your incisions as the feeling returns. You may want to take a pain pill (a narcotic or Tylenol if this was prescribed by your surgeon) when you start to experience mild pain, before the pain becomes more severe. If your pain medications do not control " your pain, you should notify your surgeon. If you are taking narcotics for pain management, do not drink alcohol, drive a car, or perform hazardous activities.  If you have questions or concerns you may call your surgeon at the number provided with your discharge instructions.     Call your surgeon if you experience blurry vision, ringing in the ears or metallic taste in your mouth.         Information about OPIOIDS     PRESCRIPTION OPIOIDS: WHAT YOU NEED TO KNOW   We gave you an opioid (narcotic) pain medicine. It is important to manage your pain, but opioids are not always the best choice. You should first try all the other options your care team gave you. Take this medicine for as short a time (and as few doses) as possible.     These medicines have risks:    DO NOT drive when on new or higher doses of pain medicine. These medicines can affect your alertness and reaction times, and you could be arrested for driving under the influence (DUI). If you need to use opioids long-term, talk to your care team about driving.    DO NOT operate heave machinery    DO NOT do any other dangerous activities while taking these medicines.     DO NOT drink any alcohol while taking these medicines.      If the opioid prescribed includes acetaminophen, DO NOT take with any other medicines that contain acetaminophen. Read all labels carefully. Look for the word  acetaminophen  or  Tylenol.  Ask your pharmacist if you have questions or are unsure.    You can get addicted to pain medicines, especially if you have a history of addiction (chemical, alcohol or substance dependence). Talk to your care team about ways to reduce this risk.    Store your pills in a secure place, locked if possible. We will not replace any lost or stolen medicine. If you don t finish your medicine, please throw away (dispose) as directed by your pharmacist. The [place] Pollution Control Agency has more information about safe disposal:  https://www.pca.[facility].mn.us/living-green/managing-unwanted-medications.     All opioids tend to cause constipation. Drink plenty of water and eat foods that have a lot of fiber, such as fruits, vegetables, prune juice, apple juice and high-fiber cereal. Take a laxative (Miralax, milk of magnesia, Colace, Senna) if you don t move your bowels at least every other day.              Medication List: This is a list of all your medications and when to take them. Check marks below indicate your daily home schedule. Keep this list as a reference.      Medications           Morning Afternoon Evening Bedtime As Needed    acetaminophen 325 MG tablet   Commonly known as:  TYLENOL   Take 3 tablets (975 mg) by mouth every 6 hours as needed for mild pain   Last time this was given:  975 mg on 8/4/2018  1:19 PM                                aspirin 81 MG tablet   Take 81 mg by mouth daily                                BUPROBAN 150 MG 12 hr tablet   Take 150 mg by mouth once   Last time this was given:  150 mg on 8/4/2018  9:32 AM   Generic drug:  buPROPion                                BUSPAR 10 MG tablet   Take 10 mg by mouth daily.   Generic drug:  busPIRone                                Calcium Citrate 200 MG Tabs   Take 1 tablet by mouth daily                                hydrochlorothiazide 25 MG tablet   Commonly known as:  HYDRODIURIL   Take 1 tablet by mouth daily.   Last time this was given:  25 mg on 8/4/2018  9:32 AM                                levothyroxine 125 MCG tablet   Commonly known as:  SYNTHROID/LEVOTHROID   165 mcg daily                                LIPITOR 40 MG tablet   Take 40 mg by mouth daily.   Generic drug:  atorvastatin                                oxyCODONE IR 5 MG tablet   Commonly known as:  ROXICODONE   Take 1 tablet (5 mg) by mouth every 6 hours as needed for other (pain control or improvement in physical function. Hold dose for analgesic side effects.)   Last time this was given:  5  mg on 8/4/2018  4:18 PM                                PROLIA SC                                Vitamin D (Cholecalciferol) 1000 units Tabs   Take 2,000 Units by mouth daily

## 2018-08-03 NOTE — ANESTHESIA CARE TRANSFER NOTE
Patient: Cinthya Waite    Procedure(s):  Davinci Total Laparoscopic Hysterectomy, Bilateral Salpingo Oophorectomy, and Cystoscopy  - Wound Class: I-Clean   - Wound Class: II-Clean Contaminated    Diagnosis: Thickened Endometrium, History Of Breast Cancer, Post Menopausal Women   Diagnosis Additional Information: No value filed.    Anesthesia Type:   General, ETT     Note:  Airway :Face Mask  Patient transferred to:PACU  Comments: Regular respirations and patent airway. VSS. IV patent and infusing. No complaints of pain. Pt resting comfortably. Report given to RN  Handoff Report: Identifed the Patient, Identified the Reponsible Provider, Reviewed the pertinent medical history, Discussed the surgical course, Reviewed Intra-OP anesthesia mangement and issues during anesthesia, Set expectations for post-procedure period and Allowed opportunity for questions and acknowledgement of understanding      Vitals: (Last set prior to Anesthesia Care Transfer)    CRNA VITALS  8/3/2018 1251 - 8/3/2018 1327      8/3/2018             Pulse: 92    SpO2: 95 %                Electronically Signed By: AXEL Whalen CRNA  August 3, 2018  1:27 PM

## 2018-08-03 NOTE — ANESTHESIA PREPROCEDURE EVALUATION
Anesthesia Evaluation     . Pt has had prior anesthetic. Type: General    No history of anesthetic complications          ROS/MED HX    ENT/Pulmonary:  - neg pulmonary ROS     Neurologic:  - neg neurologic ROS     Cardiovascular:     (+) hypertension----. : . . . :. . Previous cardiac testing date:results:date: results:ECG reviewed date:2018 results:LBBB known for 2 years. date: results:          METS/Exercise Tolerance:  >4 METS   Hematologic:  - neg hematologic  ROS       Musculoskeletal:  - neg musculoskeletal ROS       GI/Hepatic:         Renal/Genitourinary:  - ROS Renal section negative       Endo:     (+) thyroid problem hypothyroidism, Obesity, .      Psychiatric:  - neg psychiatric ROS       Infectious Disease:  - neg infectious disease ROS       Malignancy:   (+) Malignancy History of Breast  Breast CA Remission status post.         Other:    - neg other ROS                 Physical Exam  Normal systems: cardiovascular, pulmonary and dental    Airway   Mallampati: III  TM distance: >3 FB  Neck ROM: full    Dental     Cardiovascular   Rhythm and rate: regular and normal      Pulmonary    breath sounds clear to auscultation                    Anesthesia Plan      History & Physical Review  History and physical reviewed and following examination; no interval change.    ASA Status:  2 .        Plan for General and ETT with Intravenous induction. Maintenance will be Balanced.    PONV prophylaxis:  Ondansetron (or other 5HT-3) and Dexamethasone or Solumedrol  Additional equipment: 2nd IV      Postoperative Care      Consents  Anesthetic plan, risks, benefits and alternatives discussed with:  Patient..           ANESTHESIA PREOP EVALUATION        PMHx/PSHx/ROS:  PAST MEDICAL HISTORY:   Past Medical History:   Diagnosis Date     Abnormal Pap smear      Breast cancer (H) 7/2007    T1N0 left breast cancer     Depressive disorder, not elsewhere classified     Buspar, wellbutrin zoloft  sees Dr. Lim       Lichen sclerosus et atrophicus      Pure hypercholesterolemia     Lipitor     Raynaud's disease      Unspecified essential hypertension      Unspecified hypothyroidism     synthroid       PAST SURGICAL HISTORY:   Past Surgical History:   Procedure Laterality Date     ABDOMEN SURGERY      c- section x 2     APPENDECTOMY       BIOPSY       BREAST LUMPECTOMY, RT/LT  2007    Breast Lumpectomy /LT     C NONSPECIFIC PROCEDURE  10/82,     x2     C NONSPECIFIC PROCEDURE      Appendicitis     C NONSPECIFIC PROCEDURE      Hysteroscopy, D & C     COLONOSCOPY       DILATION AND CURETTAGE, OPERATIVE HYSTEROSCOPY WITH MORCELLATOR, COMBINED N/A 10/28/2015    Procedure: COMBINED DILATION AND CURETTAGE, OPERATIVE HYSTEROSCOPY WITH MORCELLATOR;  Surgeon: Cyndee Wyman MD;  Location: [facility]       FAMILY HISTORY:   Family History   Problem Relation Age of Onset     Neurologic Disorder Mother      migraines     Breast Cancer Mother      HEART DISEASE Father      Diabetes Father      Hypertension Father      Cerebrovascular Disease Father      Thyroid Disease Father      HEART DISEASE Sister      HEART DISEASE Brother      Genitourinary Problems Sister      gallstones     Depression Sister      Depression Brother      Depression Sister      Depression Brother      Diabetes Brother        Allergies:   Allergies   Allergen Reactions     Diclofenac Unknown     Elevation of LFTs.  Elevation of LFTs.       Meds:   No prescriptions prior to admission.       Current Outpatient Prescriptions   Medication Sig Dispense Refill     aspirin 81 MG tablet Take 81 mg by mouth daily       atorvastatin (LIPITOR) 40 MG tablet Take 40 mg by mouth daily.        buPROPion (BUPROBAN) 150 MG 12 hr tablet Take 150 mg by mouth 2 times daily.        busPIRone (BUSPAR) 10 MG tablet Take 10 mg by mouth daily.       Calcium Citrate 200 MG TABS Take 1 tablet by mouth daily       Denosumab (PROLIA SC)        hydrochlorothiazide  (HYDRODIURIL) 25 MG tablet Take 1 tablet by mouth daily.       levothyroxine (SYNTHROID, LEVOTHROID) 125 MCG tablet 165 mcg daily        Vitamin D, Cholecalciferol, 1000 UNITS TABS Take 2,000 Units by mouth daily         Physical Exam:  VS: T Data Unavailable, P Data Unavailable, BP Data Unavailable, R Data Unavailable, SpO2         BMP:  Lab Results   Component Value Date     04/05/2018      Lab Results   Component Value Date    POTASSIUM 3.5 04/05/2018     Lab Results   Component Value Date    CHLORIDE 109 04/05/2018     Lab Results   Component Value Date    CAYLA 8.8 04/05/2018     Lab Results   Component Value Date    CO2 27 04/05/2018     Lab Results   Component Value Date    BUN 16 04/05/2018     Lab Results   Component Value Date    CR 0.73 04/05/2018     Lab Results   Component Value Date     04/05/2018        CBC:  Lab Results   Component Value Date    WBC 7.5 04/05/2018     Lab Results   Component Value Date    HGB 15.2 04/05/2018     Lab Results   Component Value Date    HCT 44.1 04/05/2018     Lab Results   Component Value Date     04/05/2018        Coags/Type and Screen  Lab Results   Component Value Date    INR 1.03 11/30/2007       Grecia Roy M.D.    8/2/2018  8:59 PM

## 2018-08-04 VITALS
BODY MASS INDEX: 31.43 KG/M2 | HEIGHT: 64 IN | WEIGHT: 184.08 LBS | OXYGEN SATURATION: 95 % | TEMPERATURE: 98.3 F | HEART RATE: 91 BPM | DIASTOLIC BLOOD PRESSURE: 56 MMHG | SYSTOLIC BLOOD PRESSURE: 114 MMHG | RESPIRATION RATE: 18 BRPM

## 2018-08-04 PROBLEM — Z90.710 STATUS POST HYSTERECTOMY: Status: ACTIVE | Noted: 2018-08-04

## 2018-08-04 LAB
GLUCOSE BLDC GLUCOMTR-MCNC: 109 MG/DL (ref 70–99)
HGB BLD-MCNC: 12 G/DL (ref 11.7–15.7)

## 2018-08-04 PROCEDURE — 85018 HEMOGLOBIN: CPT | Performed by: OBSTETRICS & GYNECOLOGY

## 2018-08-04 PROCEDURE — 25000125 ZZHC RX 250: Performed by: STUDENT IN AN ORGANIZED HEALTH CARE EDUCATION/TRAINING PROGRAM

## 2018-08-04 PROCEDURE — 25000132 ZZH RX MED GY IP 250 OP 250 PS 637: Performed by: STUDENT IN AN ORGANIZED HEALTH CARE EDUCATION/TRAINING PROGRAM

## 2018-08-04 PROCEDURE — 25000132 ZZH RX MED GY IP 250 OP 250 PS 637: Performed by: OBSTETRICS & GYNECOLOGY

## 2018-08-04 PROCEDURE — 36415 COLL VENOUS BLD VENIPUNCTURE: CPT | Performed by: OBSTETRICS & GYNECOLOGY

## 2018-08-04 PROCEDURE — 25000128 H RX IP 250 OP 636: Performed by: STUDENT IN AN ORGANIZED HEALTH CARE EDUCATION/TRAINING PROGRAM

## 2018-08-04 PROCEDURE — 82962 GLUCOSE BLOOD TEST: CPT

## 2018-08-04 RX ORDER — ACETAMINOPHEN 325 MG/1
975 TABLET ORAL EVERY 6 HOURS PRN
Qty: 30 TABLET | Refills: 1 | Status: SHIPPED | OUTPATIENT
Start: 2018-08-04 | End: 2021-08-19

## 2018-08-04 RX ORDER — OXYCODONE HYDROCHLORIDE 5 MG/1
5 TABLET ORAL EVERY 6 HOURS PRN
Qty: 15 TABLET | Refills: 0 | Status: SHIPPED | OUTPATIENT
Start: 2018-08-04 | End: 2018-09-13

## 2018-08-04 RX ADMIN — SENNOSIDES AND DOCUSATE SODIUM 2 TABLET: 8.6; 5 TABLET ORAL at 09:32

## 2018-08-04 RX ADMIN — OXYCODONE HYDROCHLORIDE 5 MG: 5 TABLET ORAL at 13:19

## 2018-08-04 RX ADMIN — ONDANSETRON 4 MG: 4 TABLET, ORALLY DISINTEGRATING ORAL at 09:39

## 2018-08-04 RX ADMIN — ACETAMINOPHEN 975 MG: 325 TABLET, FILM COATED ORAL at 13:19

## 2018-08-04 RX ADMIN — OXYCODONE HYDROCHLORIDE 5 MG: 5 TABLET ORAL at 01:23

## 2018-08-04 RX ADMIN — SODIUM CHLORIDE, POTASSIUM CHLORIDE, SODIUM LACTATE AND CALCIUM CHLORIDE: 600; 310; 30; 20 INJECTION, SOLUTION INTRAVENOUS at 02:39

## 2018-08-04 RX ADMIN — ACETAMINOPHEN 975 MG: 325 TABLET, FILM COATED ORAL at 06:06

## 2018-08-04 RX ADMIN — ONDANSETRON 4 MG: 4 TABLET, ORALLY DISINTEGRATING ORAL at 16:19

## 2018-08-04 RX ADMIN — BUPROPION HYDROCHLORIDE 150 MG: 150 TABLET, FILM COATED, EXTENDED RELEASE ORAL at 09:32

## 2018-08-04 RX ADMIN — ONDANSETRON 4 MG: 2 INJECTION INTRAMUSCULAR; INTRAVENOUS at 01:30

## 2018-08-04 RX ADMIN — OXYCODONE HYDROCHLORIDE 5 MG: 5 TABLET ORAL at 16:18

## 2018-08-04 RX ADMIN — OXYCODONE HYDROCHLORIDE 5 MG: 5 TABLET ORAL at 06:06

## 2018-08-04 RX ADMIN — HYDROCHLOROTHIAZIDE 25 MG: 25 TABLET ORAL at 09:32

## 2018-08-04 RX ADMIN — LEVOTHYROXINE SODIUM 165 MCG: 300 TABLET ORAL at 09:32

## 2018-08-04 ASSESSMENT — PAIN DESCRIPTION - DESCRIPTORS
DESCRIPTORS: DULL
DESCRIPTORS: SORE

## 2018-08-04 ASSESSMENT — ACTIVITIES OF DAILY LIVING (ADL)
ADLS_ACUITY_SCORE: 10

## 2018-08-04 NOTE — PLAN OF CARE
Problem: Patient Care Overview  Goal: Plan of Care/Patient Progress Review  Outcome: Improving        VS:   Cap[place]- IPI 8-9 EtCO2 33 O2 95% on 2L NC afebrile. LS clear, BS hypoactive. Denies SOB/CP.   Output:   Not passing flatus. Orange colored urine draining into tripp.    Activity:   Pt is independently repositioning as needed. Side lying as needed. Ice/hot packs offered.     Skin: 4 lap sites. Mepilex placed on coccyx in OR for prevention- still in place. Pt denies any skin concerns.   Pain:   Managed w/ tylenol & 5mg oxycodone.   Neuro/CMS:   A&O, denies numbness or tingling.   Dressing(s):   Mepilex on coccyx. 4 lap sites ARELY- liquid bandages.   Diet:   Clear liquid diet- has tolerated sips of water and ginger ale.   LDA:   L PIV SL  R PIV infusing 100mL/hr    Equipment:   [place]  PCDs   Plan:   Vaginal packing to be removed 8/4  Tripp to be removed 8/4   Additional Info:   Pt is able to make needs known.

## 2018-08-04 NOTE — PLAN OF CARE
Problem: Hysterectomy (Adult)  Intervention: Prevent/Manage Postoperative Nausea and Vomiting    VSS. Afebrile. CMS/neuros intact; denies numbness and tingling in all extremities.  Lap sites on abd open to air, intact. BS hypoactive in all quadrants. No flatus yet. Last BM = 8/3  Endorses generalized body aches. Pain managed with 5mg of PRN oxycodone Q3H. States pain is comfortably managed.  PIV infusing LR at 100mL; saline lock when tolerating 500cc PO. Pastrana catheter in place.  Tolerating clear liquid diet and some saltine crackers.  Stood at bedside but was unable to take further steps due to dizziness.  Able to make needs known. Continue POC.

## 2018-08-04 NOTE — PROGRESS NOTES
Gynecologic Postoperative Check Note  8/4/2018    S:  Late to see patient due to more acute patient care. Pt is currently sleeping so I touched base with her Colleen RN. Earlier tolerated crackers and her oxycodone with no nausea/vomiting. Pain is decently well controlled w current PO regimen. Tripp and vag packing still in place, hasn't gotten up yet but has sat up in the bed. Has not passed gas yes.     O:  Vitals:    08/03/18 1745 08/03/18 1800 08/03/18 2100 08/03/18 2213   BP: 118/67 122/69  117/76   Pulse:    91   Resp:    14   Temp:    98.1  F (36.7  C)   TempSrc:    Oral   SpO2: 94% 95% 96% 94%   Weight:       Height:         UOP: 100cc/hr over past 8 hrs    Exam: deferred    A/P: 62 year old POD#1 s/p RA-TLH, BSO for postmenopausal bleeding and a history of hormone receptor positive breast cancer. Recovering appropriately.     FEN: LR at 100cc/hr until adequate PO intake  Pain: Tylenol and oxycodone 5mg Q4h PRN (avoid NSAIDs for bleeding)  Heme: Hgb 14.5 >  > am pending   - no s/s anemia   CV: HTN - PTA hydrochlorothiazide ordered  GI: advance diet as tolerated  : Vag pack in place   - remove in AM    - remove tripp at time of vaginal packing removal   Endo: hypothyroid- continue PTA synthroid  PPX: ambulation  Dispo: likely later today on POD#1    Maile Ocampo MD  8/4/2018 1:45 AM

## 2018-08-04 NOTE — PROGRESS NOTES
Cindy  S- new dc instructions reviewed w pt and spouse. Some nausea w small emesis .  Thinks possibly r/t pain med.   States still feels ready for dc to home.  Dc prescription given.  Home per w/ch w belongings with spouse per car.

## 2018-08-04 NOTE — OP NOTE
INDICATIONS FOR PROCEDURE: 62 year old yo woman with postmenopausal bleeding elects to proceed with Davinci assisted total laparoscopic hysterectomy and bilateral salpingooophorectomy.    PREOPERATIVE DIAGNOSIS:  Post menopausal bleeding, benign biopsy                                                      History of ER/ID positive breast cancer    POSTOPERATIVE DIAGNOSIS:  same     PROCEDURE:  Da Citlali assisted total laparoscopic hysterectomy and bilateral salpingo-oophorectomy       Cystoscopy       Vaginal Packing    SURGEON:  Cyndee Caballero MD     ASSISTANTS:  Berkley Farias MD (PGY4) &  Lilia Vargas MD (PGY1), Farzana Deras MS3.     ANESTHESIA:  General endotracheal, TAPS blocks    ESTIMATED BLOOD LOSS:  400 cc.     IV FLUIDS:  1800 mL of LR, No transfusion was given.     URINE OUTPUT: 500 mL.     DRAINS IN PLACE AT END OF PROCEDURE: Pastrana catheter, vaginal packing.     SPECIMENS:  Included uterus, cervix, bilateral fallopian tubes and bilateral ovaries.     FINDINGS:  She had a 10 cm uterus with 2 cm subserosal left fundal fibroid0 and  normal-appearing fallopian tubes and ovaries.  The ovaries appeared normal.  Vulvar tissue is pale and thin with scarring consistent with lichen sclerosis.  Vagina was very atrophic, easily bled with manipulation with uterine manipulator and delivery of somewhat enlarged uterus through the vagina.  Intraabdominally there were adhesions of the left adnexa to the descending colon.  Right ureter visible, left ureter was not visible transperitoneally.  Moderate dense adhesions of bladder peritoneum to lower uterine segment.    COMPLICATIONS:  None.     CONDITION:  Stable.     DETAILS OF PROCEDURE:  The patient was brought to the operating room after reviewing the risks, benefits and alternatives to the procedure.  She was positioned in lithotomy with both arms tucked and prepped and draped in the usual sterile fashion.  She did receive preoperative antibiotics and had SCDs in  place.  The procedure began vaginally, where the cervix was dilated and sounded to 10 cm and the modified KOH manipulator/Advincula Arch was placed and attached to the uterine positioning system.  A Pastrana catheter was placed in the  bladder.  Procedure then turned to the abdomen and an 8 mm incision was made in the umbilicus and a Veress needle inserted.  Insufflation began with an opening pressure of 4 mmHg.  Once insufflation was completed, the trocar was placed and camera confirmed placement within the abdominal cavity and there was no evidence of traumatic entry.  The remainder of the ports, 2 robotic ports and the 5mm assistant port in the LUQ were placed under direct visualization.  The procedure then began on the right side.  The right round ligament was identified, cauterized and divided and opening up anteriorly to create the beginning of the bladder flap. The posterior leaf of the broad ligament was opened. Then the left fallopian tube and ovary were elevated and the ureter identified well below the IP.  The IP ligament was then cauterized and divided.  The reach of robotic arm one was not adequate to continue to the bladder flap or left side and the the robot was undocked and adjusted.  This allowed for normal functioning of the robot.  This allowed further opening of the posterior leaf of the broad ligament and skeletonization of the right uterine artery. Once skeletonization was achieved on the left side and the uterine artery was partially cauterized.  , We then turned to the left side.  On the left side, the left round  ligament was cauterized and divided and opened anteriorly to begin the bladder flap on the left side.  The bladder was somewhat densely adherent to this area and some time was required to carefully dissect the bladder from the lower uterine segment.  Finally the bladder flap was completed to be below the level of the colpotomy cup.  Turning attention to the posterior leaf of the broad  ligament, it was opened.  We then elevated the left fallopian tube and ovary.  The left ureter could not be visualized transperitoneally.  The IP was cauterized and transected adjacent to the left ovary.  We continued  to further skeletonize the left uterine artery.  The left uterine artery was cauterized and divided down to the level of the colpotomy cup.  We turned back to the right side to complete cauterization of the uterine artery on that side.  At this point, the colpotomy was created beginning at 2 o'clock.  Once the colpotomy cup was identified confirming entry into the vagina.  The colpotomy was completed in a clockwise fashion.  The uterus, fallopian tubes, ovaries delivered through the vagina with some difficulty. Bleeding was noted from the this irritation to the vaginal walls.   At this point a balloon was placed in the vagina and the vaginal cuff was closed using 0 V-Loc suture.  Once  this was completed we inspected all pedicles and they were found to be hemostatic.  All skin incisions were closed using 4-0 Monocryl and Dermabond.  We then turned to the cystoscopy portion of the case.  A 30 degree cystoscope was used to carefully inspect the urethra and bladder.  There was brisk efflux from both ureters and no injury was noted to the bladder.  The tripp catheter remained in the bladder. The balloon was removed from the vagina and there was oozing from several locations within the vagina.  Vaginal packing soaked with Premarin cream was place in the vagina.  The patient was taken to the recovery room in stable condition.     Cyndee Caballero ME

## 2018-08-04 NOTE — PROGRESS NOTES
POD #1 s/p RTLH/BSO, vaginal packing    S: doing well this morning, anxious to get packing and catheter out.  Had emesis x 1 last night and feels slight nausea this morning.  No flatus yet, is beltching, tolerating liquids and crackers.  Pastrana still in place.    O:  Patient Vitals for the past 24 hrs:   BP Temp Temp src Pulse Heart Rate Resp SpO2   08/04/18 0631 107/69 98.2  F (36.8  C) Oral 91 - 18 95 %   08/04/18 0235 98/58 97.6  F (36.4  C) Oral 96 - 13 94 %   08/04/18 0100 - - - - - - 94 %   08/03/18 2213 117/76 98.1  F (36.7  C) Oral 91 83 14 94 %   08/03/18 2100 - - - - - - 96 %   08/03/18 1800 122/69 - - - 88 - 95 %   08/03/18 1745 118/67 - - - 91 - 94 %   08/03/18 1730 115/68 - - - 81 - 93 %   08/03/18 1715 115/58 - - - 65 - 95 %   08/03/18 1700 116/67 - - - 85 - 93 %   08/03/18 1645 109/66 - - - 82 - 94 %   08/03/18 1630 109/66 - - - 81 12 93 %   08/03/18 1624 114/58 - - - 84 12 92 %   08/03/18 1545 128/74 - - - 81 12 95 %   08/03/18 1530 127/66 97.7  F (36.5  C) Oral - 83 10 95 %   08/03/18 1515 131/64 - - - 87 12 93 %   08/03/18 1500 121/74 - - - 83 12 96 %   08/03/18 1445 132/78 - - - 85 12 95 %   08/03/18 1430 129/76 - - - 83 12 98 %   08/03/18 1415 126/75 98.1  F (36.7  C) Axillary - 81 12 98 %   08/03/18 1400 121/65 - - - 83 13 97 %   08/03/18 1345 116/69 98.1  F (36.7  C) Axillary - 81 12 97 %   08/03/18 1330 101/61 - - - 81 14 98 %   08/03/18 1323 101/61 97.9  F (36.6  C) Axillary - - 12 100 %     Gen'l: appears well, sitting at edge of bed  CV: RRR  Lungs CTA bilaterally  Abd: soft, laparoscopic incisions sites are intact, with dermabond, no ecchymosis   Ext: NT  Vulva: changes c/s lichen sclerosis, packing removed and was moderately soaked with dark red blood.  No ongoing bleeding evident.  Pastrana removed.  Hemoglobin   Date Value Ref Range Status   08/04/2018 12.0 11.7 - 15.7 g/dL Final   08/03/2018 14.5 11.7 - 15.7 g/dL Final         Assessment/Plan  POD #1 s/p RTLH/BSO, vaginal  packing    Post-op: continue current pain regimen, advance diet, encourage ambulation this am.  Trial of void now that tripp is out, monitor vaginal bleeding  Pain: tylenol and oxycodone, ok to add NSAIDS if minimal bleeding with packing out  Heme: hemoglobin stable, expected drop after surgery  CV: continue home HTN meds  Endo: continue synthroid dose  Dispo: discharge to home this afternoon pending meeting post-op goals above.    Cyndee Caballero MD

## 2018-08-04 NOTE — PLAN OF CARE
Problem: Patient Care Overview  Goal: Plan of Care/Patient Progress Review  Outcome: Improving  Nurinsg    Post op hyst  S- In am sl lightheaded and fatigued,  Mild nausea,  Did have emesis when up in chiar mid am.  zofran x1 w some decrease.   1300- danita more clear liq fluids,  1/2 banana and crackers, fruit ice.  No further emesis  No flatus yet.  Walked kaur x2.  Ch=air now.  Voided good amts  pvr 13cc.    States feeling close to ready to go home.  Sl lightheaded yet but getting better  States felt better after vag packing and tripp dc'd   sm  amt vag bleeding on pad    Inc sites intact  occas diaphoretic, feeling warm  tmep ok  A- improving    r- plan dc in cpl hours when  comes back

## 2018-08-05 ENCOUNTER — HEALTH MAINTENANCE LETTER (OUTPATIENT)
Age: 63
End: 2018-08-05

## 2018-08-08 LAB — COPATH REPORT: NORMAL

## 2018-08-09 ENCOUNTER — TELEPHONE (OUTPATIENT)
Dept: OBGYN | Facility: CLINIC | Age: 63
End: 2018-08-09

## 2018-08-09 NOTE — TELEPHONE ENCOUNTER
Called patient to review benign pathology report.  She is doing well, has stopped the oxycodone which helped with the nausea she was having.  She still has some spotting, but it is light.  She has minimal pain and already has 3000 steps in today on her fitbit. Post op check scheduled in September.  She will call with any concerns before then.    Cyndee Caballero MD

## 2018-08-17 ENCOUNTER — COMMUNICATION - HEALTHEAST (OUTPATIENT)
Dept: INTERNAL MEDICINE | Facility: CLINIC | Age: 63
End: 2018-08-17

## 2018-09-01 ENCOUNTER — COMMUNICATION - HEALTHEAST (OUTPATIENT)
Dept: INTERNAL MEDICINE | Facility: CLINIC | Age: 63
End: 2018-09-01

## 2018-09-02 ENCOUNTER — COMMUNICATION - HEALTHEAST (OUTPATIENT)
Dept: INTERNAL MEDICINE | Facility: CLINIC | Age: 63
End: 2018-09-02

## 2018-09-13 ENCOUNTER — RECORDS - HEALTHEAST (OUTPATIENT)
Dept: ADMINISTRATIVE | Facility: OTHER | Age: 63
End: 2018-09-13

## 2018-09-13 ENCOUNTER — OFFICE VISIT (OUTPATIENT)
Dept: OBGYN | Facility: CLINIC | Age: 63
End: 2018-09-13
Attending: OBSTETRICS & GYNECOLOGY
Payer: COMMERCIAL

## 2018-09-13 VITALS
WEIGHT: 185 LBS | HEART RATE: 73 BPM | SYSTOLIC BLOOD PRESSURE: 123 MMHG | BODY MASS INDEX: 31.76 KG/M2 | DIASTOLIC BLOOD PRESSURE: 82 MMHG

## 2018-09-13 DIAGNOSIS — N95.2 VAGINAL ATROPHY: ICD-10-CM

## 2018-09-13 DIAGNOSIS — Z90.710 HISTORY OF ROBOT-ASSISTED LAPAROSCOPIC HYSTERECTOMY: Primary | ICD-10-CM

## 2018-09-13 DIAGNOSIS — L90.0 LICHEN SCLEROSUS: ICD-10-CM

## 2018-09-13 PROCEDURE — G0463 HOSPITAL OUTPT CLINIC VISIT: HCPCS | Mod: ZF

## 2018-09-13 RX ORDER — ESTRADIOL 10 UG/1
10 INSERT VAGINAL
Qty: 8 TABLET | Refills: 0 | Status: SHIPPED | OUTPATIENT
Start: 2018-09-13 | End: 2019-03-21

## 2018-09-13 RX ORDER — CLOBETASOL PROPIONATE 0.5 MG/G
OINTMENT TOPICAL
Qty: 45 G | Refills: 3 | Status: SHIPPED | OUTPATIENT
Start: 2018-09-13 | End: 2018-12-20

## 2018-09-13 ASSESSMENT — PAIN SCALES - GENERAL: PAINLEVEL: NO PAIN (0)

## 2018-09-13 NOTE — MR AVS SNAPSHOT
"              After Visit Summary   9/13/2018    Cinthya Waite    MRN: [id number]           Patient Information     Date Of Birth          1955        Visit Information        Provider Department      9/13/2018 10:30 AM Cyndee Caballero MD [facility]         Follow-ups after your visit        Your next 10 appointments already scheduled     Oct 04, 2018  9:30 AM CDT   [facility] Draw with  [facility] DRAW   [facility] ([facility])    [address]  [address]  [facility] [id number] [phone]            Oct 04, 2018 10:00 AM CDT   MA SCREENING BILATERAL W/ CADE with [id number]   [facility] ([facility])    [address] [place] [address], 2nd Floor  [facility] [id number] [phone]           Three-dimensional (3D) mammograms are available at [place] locations in [place], [place], [place], [place], [facility], [place], [place], and [place]. [facility] locations include [place] and [facility] in [place]. Benefits of 3D mammograms include: - Improved rate of cancer detection - Decreases your chance of having to go back for more tests, which means fewer: - \"False-positive\" results (This means that there is an abnormal area but it isn't cancer.) - Invasive testing procedures, such as a biopsy or surgery - Can provide clearer images of the breast if you have dense breast tissue. 3D mammography is an optional exam that anyone can have with a 2D mammogram. It doesn't replace or take the place of a 2D mammogram. 2D mammograms remain an effective screening test for all women.  Not all insurance companies cover the cost of a 3D mammogram. Check with your insurance.            Oct 04, 2018 10:30 AM CDT   (Arrive by 10:15 AM)   Return Visit with Johnathan Herrera MD   [facility] ([facility])    [address] [place] [address] " [address]  [address]  [facility] [address]   [phone]            Dec 20, 2018 10:00 AM CST   Return Visit with Cyndee Caballero MD   [facility] ([facility])    [facility] [address]  [address]  [facility] [address] [phone]              Who to contact     Please call your clinic at [phone] to:    Ask questions about your health    Make or cancel appointments    Discuss your medicines    Learn about your test results    Speak to your doctor            Additional Information About Your Visit        [email or web address] Information     [employer] gives you secure access to your electronic health record. If you see a primary care provider, you can also send messages to your care team and make appointments. If you have questions, please call your primary care clinic.  If you do not have a primary care provider, please call [phone] and they will assist you.      [employer] is an electronic gateway that provides easy, online access to your medical records. With [employer], you can request a clinic appointment, read your test results, renew a prescription or communicate with your care team.     To access your existing account, please contact your [facility] or call [phone] for assistance.        Care EveryWhere ID     This is your Care EveryWhere ID. This could be used by other organizations to access your [facility] medical records  [id number]        Your Vitals Were     Pulse Last Period Breastfeeding? BMI (Body Mass Index)          73 06/07/2007 No 31.76 kg/m2         Blood Pressure from Last 3 Encounters:   09/13/18 123/82   08/04/18 114/56   04/19/18 129/86    Weight from Last 3 Encounters:   09/13/18 83.9 kg (185 lb)   08/03/18 83.5 kg (184 lb 1.4 oz)   04/19/18 83 kg (183 lb)              Today, you had the following     No orders found for display         Today's Medication Changes          These changes are accurate  as of 9/13/18 10:53 AM.  If you have any questions, ask your nurse or doctor.               Stop taking these medicines if you haven't already. Please contact your care team if you have questions.     oxyCODONE IR 5 MG tablet   Commonly known as:  ROXICODONE   Stopped by:  Cyndee Caballero MD                    Primary Care Provider Office Phone # Fax #    Heidi Valencia [phone]       [facility] [address] [address]        Equal Access to Services     [facility]: Hadii aad ku hadasho Soomaali, waaxda luqadaha, qaybta kaalmada adeegyada, waxay idiin hayno valentin . So [facility] [phone].    ATENCIÓN: Si habla español, tiene a ames disposición servicios gratuitos de asistencia lingüística. Trevon al [phone].    We comply with applicable federal civil rights laws and [place] laws. We do not discriminate on the basis of race, color, national origin, age, disability, sex, sexual orientation, or gender identity.            Thank you!     Thank you for choosing [facility]  for your care. Our goal is always to provide you with excellent care. Hearing back from our patients is one way we can continue to improve our services. Please take a few minutes to complete the written survey that you may receive in the mail after your visit with us. Thank you!             Your Updated Medication List - Protect others around you: Learn how to safely use, store and throw away your medicines at www.disposemymeds.org.          This list is accurate as of 9/13/18 10:53 AM.  Always use your most recent med list.                   Brand Name Dispense Instructions for use Diagnosis    acetaminophen 325 MG tablet    TYLENOL    30 tablet    Take 3 tablets (975 mg) by mouth every 6 hours as needed for mild pain    S/P laparoscopic hysterectomy       aspirin 81 MG tablet      Take 81 mg by mouth daily        BUPROBAN 150 MG 12 hr tablet   Generic  drug:  buPROPion      Take 150 mg by mouth once        BUSPAR 10 MG tablet   Generic drug:  busPIRone      Take 10 mg by mouth daily.        Calcium Citrate 200 MG Tabs      Take 1 tablet by mouth daily        hydrochlorothiazide 25 MG tablet    HYDRODIURIL     Take 1 tablet by mouth daily.        levothyroxine 125 MCG tablet    SYNTHROID/LEVOTHROID     165 mcg daily        LIPITOR 40 MG tablet   Generic drug:  atorvastatin      Take 40 mg by mouth daily.        PROLIA SC           Vitamin D (Cholecalciferol) 1000 units Tabs      Take 2,000 Units by mouth daily

## 2018-09-13 NOTE — NURSING NOTE
Chief Complaint   Patient presents with     Surgical Followup     Davinci total Hysterectomy 8/3/2018   Carline Parks LPN

## 2018-09-13 NOTE — LETTER
2018       RE: Cinthya Waite  [address]  [place] [address]     Dear Colleague,    Thank you for referring your patient, Cinthya Waite, to the [facility] at [facility]. Please see a copy of my visit note below.    Cinthya Waite is 62 year old yo s/p Davinci assisted total laparoscopic hysterectomy on 8/3/2018 for postmenopausal bleeding and history of ER/IN positive breast cancer.  She has been doing well. She states overall the recovery from this surgery was less than her finger surgery.  She has not had bleeding since 2 weeks following the surgery, which was complicated by vaginal abrasion and bleeding requiring packing and overnight hospital stay.  She denies pain, she no longer requires any pain medication.  Bladder and bowel function have returned to normal.     ROS: 10 point ROS neg other than the symptoms noted above in the HPI.  Past Medical History:   Diagnosis Date     Abnormal Pap smear      Breast cancer (H) 2007    T1N0 left breast cancer     Depressive disorder, not elsewhere classified     Buspar, wellbutrin zoloft  sees Dr. Lim      Lichen sclerosus et atrophicus      Pure hypercholesterolemia     Lipitor     Raynaud's disease      Unspecified essential hypertension      Unspecified hypothyroidism     synthroid       Past Surgical History:   Procedure Laterality Date     ABDOMEN SURGERY      c- section x 2     APPENDECTOMY       BIOPSY       BREAST LUMPECTOMY, RT/LT  2007    Breast Lumpectomy /LT     C NONSPECIFIC PROCEDURE  10/82,     x2     C NONSPECIFIC PROCEDURE      Appendicitis     C NONSPECIFIC PROCEDURE      Hysteroscopy, D & C     COLONOSCOPY       CYSTOSCOPY N/A 8/3/2018    Procedure: CYSTOSCOPY;;  Surgeon: Cyndee Caballero MD;  Location: [facility]     DAVINCI HYSTERECTOMY TOTAL, BILATERAL SALPINGO-OOPHORECTOMY, COMBINED Bilateral 8/3/2018     Procedure: COMBINED DAVINCI HYSTERECTOMY TOTAL, SALPINGO-OOPHORECTOMY;  Davinci Total Laparoscopic Hysterectomy, Bilateral Salpingo Oophorectomy, and Cystoscopy ;  Surgeon: Cyndee Caballero MD;  [facility]     DILATION AND CURETTAGE, OPERATIVE HYSTEROSCOPY WITH MORCELLATOR, COMBINED N/A 10/28/2015    Procedure: COMBINED DILATION AND CURETTAGE, OPERATIVE HYSTEROSCOPY WITH MORCELLATOR;  Surgeon: Cyndee Wyman MD;  Location: [facility] OR       /82  Pulse 73  Wt 83.9 kg (185 lb)  LMP 06/07/2007  Breastfeeding? No  BMI 31.76 kg/m2   Gen'l: well appearing  Abdomen: soft, NT, ND well healed laparoscopic stab wounds  Vulva: agglutination of bilateral labia minora, adhesion of clitoral valencia and lichenification of perineum and perianal area.  At introitus are 2 purple ecchymosis lesions at 4 and 7 o'clock  Urethra: normal  Vagina: introitus is narrow, difficult to insert speculum, this causes some bright red blood, vaginal mucosa is pink, physiologic discharge present, suture line visible at vaginal cuff, cuff palpates smooth.  Cervix/Uterus: absent  Adnexa: no palpable masses    Pathology:  Benign endometrial atrophy, multiple leiomyomas, benign ovaries    Assessment/Plan:  6 weeks s/p Davinci assisted total laparoscopic hysterectomy, bilateral salpingectomy doing well.  Lichen Sclerosis     - Reviewed benign pathology  - Reviewed that sutures at vaginal cuff are designed to be delayed absorbable   - Given bleeding on exam today recommend 4 week course of vagifem. Discussed risk/benefit profile in light of her history of breast cancer.  - Recommend clobetasol ointment for treatment of lichen sclerosis, patient instructed on use.  Recommend follow-up for this in 3 months.  - Continue pelvic rest and lifting restrictions for 8 wks.  OK to increase activity.    Cyndee Caballero MD

## 2018-09-13 NOTE — PROGRESS NOTES
Cinthya Waite is 62 year old yo s/p Davinci assisted total laparoscopic hysterectomy on 8/3/2018 for postmenopausal bleeding and history of ER/MI positive breast cancer.  She has been doing well. She states overall the recovery from this surgery was less than her finger surgery.  She has not had bleeding since 2 weeks following the surgery, which was complicated by vaginal abrasion and bleeding requiring packing and overnight hospital stay.  She denies pain, she no longer requires any pain medication.  Bladder and bowel function have returned to normal.     ROS: 10 point ROS neg other than the symptoms noted above in the HPI.  Past Medical History:   Diagnosis Date     Abnormal Pap smear      Breast cancer (H) 2007    T1N0 left breast cancer     Depressive disorder, not elsewhere classified     Buspar, wellbutrin zoloft  sees Dr. Lim      Lichen sclerosus et atrophicus      Pure hypercholesterolemia     Lipitor     Raynaud's disease      Unspecified essential hypertension      Unspecified hypothyroidism     synthroid       Past Surgical History:   Procedure Laterality Date     ABDOMEN SURGERY      c- section x 2     APPENDECTOMY       BIOPSY       BREAST LUMPECTOMY, RT/LT  2007    Breast Lumpectomy /LT     C NONSPECIFIC PROCEDURE  10/82,     x2     C NONSPECIFIC PROCEDURE      Appendicitis     C NONSPECIFIC PROCEDURE      Hysteroscopy, D & C     COLONOSCOPY       CYSTOSCOPY N/A 8/3/2018    Procedure: CYSTOSCOPY;;  Surgeon: Cyndee Caballero MD;  [facility]     DAVINCI HYSTERECTOMY TOTAL, BILATERAL SALPINGO-OOPHORECTOMY, COMBINED Bilateral 8/3/2018    Procedure: COMBINED DAVINCI HYSTERECTOMY TOTAL, SALPINGO-OOPHORECTOMY;  Davinci Total Laparoscopic Hysterectomy, Bilateral Salpingo Oophorectomy, and Cystoscopy ;  Surgeon: Cyndee Caballero MD;  [facility]     DILATION AND CURETTAGE, OPERATIVE HYSTEROSCOPY WITH MORCELLATOR, COMBINED N/A 10/28/2015    Procedure:  COMBINED DILATION AND CURETTAGE, OPERATIVE HYSTEROSCOPY WITH MORCELLATOR;  Surgeon: Cyndee Wyman MD;  [facility]       /82  Pulse 73  Wt 83.9 kg (185 lb)  LMP 06/07/2007  Breastfeeding? No  BMI 31.76 kg/m2   Gen'l: well appearing  Abdomen: soft, NT, ND well healed laparoscopic stab wounds  Vulva: agglutination of bilateral labia minora, adhesion of clitoral valencia and lichenification of perineum and perianal area.  At introitus are 2 purple ecchymosis lesions at 4 and 7 o'clock  Urethra: normal  Vagina: introitus is narrow, difficult to insert speculum, this causes some bright red blood, vaginal mucosa is pink, physiologic discharge present, suture line visible at vaginal cuff, cuff palpates smooth.  Cervix/Uterus: absent  Adnexa: no palpable masses    Pathology:  Benign endometrial atrophy, multiple leiomyomas, benign ovaries    Assessment/Plan:  6 weeks s/p Davinci assisted total laparoscopic hysterectomy, bilateral salpingectomy doing well.  Lichen Sclerosis     - Reviewed benign pathology  - Reviewed that sutures at vaginal cuff are designed to be delayed absorbable   - Given bleeding on exam today recommend 4 week course of vagifem. Discussed risk/benefit profile in light of her history of breast cancer.  - Recommend clobetasol ointment for treatment of lichen sclerosis, patient instructed on use.  Recommend follow-up for this in 3 months.  - Continue pelvic rest and lifting restrictions for 8 wks.  OK to increase activity.    Cyndee Caballero MD

## 2018-09-14 ENCOUNTER — COMMUNICATION - HEALTHEAST (OUTPATIENT)
Dept: INTERNAL MEDICINE | Facility: CLINIC | Age: 63
End: 2018-09-14

## 2018-09-14 DIAGNOSIS — Z00.00 ROUTINE HEALTH MAINTENANCE: ICD-10-CM

## 2018-09-17 ENCOUNTER — AMBULATORY - HEALTHEAST (OUTPATIENT)
Dept: NURSING | Facility: CLINIC | Age: 63
End: 2018-09-17

## 2018-09-17 DIAGNOSIS — Z00.00 ROUTINE HEALTH MAINTENANCE: ICD-10-CM

## 2018-09-24 ENCOUNTER — RECORDS - HEALTHEAST (OUTPATIENT)
Dept: ADMINISTRATIVE | Facility: OTHER | Age: 63
End: 2018-09-24

## 2018-09-26 ENCOUNTER — COMMUNICATION - HEALTHEAST (OUTPATIENT)
Dept: INTERNAL MEDICINE | Facility: CLINIC | Age: 63
End: 2018-09-26

## 2018-09-26 DIAGNOSIS — E03.9 HYPOTHYROIDISM: ICD-10-CM

## 2018-10-03 NOTE — PROGRESS NOTES
BOOKER Waite is a 60-year-old woman with a history of stage IIA, T2 N0 M0, ER positive, KS positive and HER-2 negative invasive ductal carcinoma of the left breast diagnosed in 2007.  She is status post lumpectomy 08/05/2007 followed by 4 cycles of Taxotere and Cyclophosphamide. Patient completed radiation therapy in 01/2008. She initiated Tamoxifen and was on it for one year and when she became post-menopausal, she was changed to Arimidex in 08/2008There is a small seroma at the site of the lumpectomy just above the incision.  It is also possible there could be some fat necrosis there.  This area of firmness has been stable.  She has been followed by our clinic every 6 months and we will alternate followup with Michelle Cole.  She has been followed by yearly mammographic surveillance.      INTERVAL HISTORY:  Cinthya returns to clinic.  She has been feeling entirely well.  She has no pain, no fatigue, no depression and no anxiety.      REVIEW OF SYSTEMS:  A 10-point review of systems is entirely negative.  She rode her bike to clinic and has been exercising more than 150 minutes per week and I commended her on her exercise.  She has been eating a healthful diet.  She consumes very little alcohol.  She is taking calcium and vitamin D. although I did recommend that she cut back the vitamin D from 5000 units a day to 2000.  She has been started on Prolia for osteoporosis and she is getting that every 6 months.      PHYSICAL EXAMINATION:   VITAL SIGNS:  Blood pressure 120/79, temperature 97, pulse 98, respirations 16, O2 sat 97% on room air, height 1.6 meters and weight 80 kg.   GENERAL:  Cinthya appeared generally well.  She has no alopecia.   HEENT:  No lesions in the oropharynx.   LYMPH:  There is no palpable cervical, supraclavicular, subclavicular or axillary lymphadenopathy.   BREASTS:  Examination of the right breast is without masses.  There was some fungal infection at the 5 o'clock position under the  right breast and I recommended nystatin powder.  Examination of the left breast revealed a well-healed incision at the 11 o'clock position, 4 fingerbreadths from the nipple-areolar complex, without erythema or masses or seroma.  No masses in the left breast.  The left axillary incision is well-healed without erythema or masses.  Both nipples were inverted.   LUNGS:  Clear to percussion and auscultation.   HEART:  There is a regular rate and rhythm, S1, S2.   ABDOMEN:  Soft and nontender, without hepatosplenomegaly.   EXTREMITIES:  Without edema.   PSYCHIATRIC:  Mood and affect were normal.        LABORATORY DATA:  CBC and CMP were not obtained today, but were within normal limits on 08/01/2017.  She did have a CT scan of the chest with contrast which showed decreased size of the ground-glass nodular opacities in the right lower lobe favoring resolving infectious or inflammatory etiology.  A followup CT scan in 6 months was recommended.  Moderate hiatal hernia and unchanged soft tissue focus of the left lumpectomy site.  Continued yearly mammography was recommended.      ASSESSMENT AND PLAN:     1.  Cinthya Waite is a 61-year-old woman with a history of a stage IIA, T2 N0 M0, ER positive, ID positive and HER-2 negative invasive ductal carcinoma of the left breast diagnosed in 2007.  There is a small seroma at the lumpectomy site just above the incision, which is nearly completely resolved.  Our plan is to continue with every 6-month followup alternating with an HALEY.  We will continue with surveillance screening mammography only and she is due for a mammogram in a year.   2.  Hormonal therapy.  Cinthya is now off hormonal therapy after having completed 7-1/2 years.   3.  Small ground-glass nodular opacities in the right lower lobe, favoring resolving infection.  It is recommended to obtain a followup chest CT scan at 6 months.   4.  Continue with calcium and vitamin D, but decreased the vitamin D dose to 2000  international units daily.   5.  Continue with 150 minutes of exercise a week.   6.  Continue with a diet that is low in saturated fat.   7.  Followup.  She will see an HALEY in followup in 6 months with a chest CT and me in 6 months with a chest CT and a mammogram. Follow up with HALEY 4-5 with chest CT 4-4 and with me 10-4-18 with tomomammogram with CBC, CMP both visits. Left breast biopsy 8 AM 10-5-18.  CBC, CMP.  Pathology for ER, KS, HER2 FISH if required. Follow up with me 10-18-18.        Thank you for the opportunity to participate in Cinthya's care.    Thank you for allowing us to participate in this patient's care.  The patient was seen and evaluated by me.  I discussed the patient with the resident and agree with the findings and plan in the note, which was edited by me.    Sincerely,     Johnathan Herrera MD    [facility]  [phone].        She was seen and evaluated with staff, Dr. Herrera.    Francis Arechiga MD PGY-5  Radiation Oncology Resident, [facility]  Pager: [phone]        I spent 20 minutes with the patient more than 50% of which was in counseling and coordination of care.

## 2018-10-04 ENCOUNTER — RADIANT APPOINTMENT (OUTPATIENT)
Dept: MAMMOGRAPHY | Facility: CLINIC | Age: 63
End: 2018-10-04
Attending: INTERNAL MEDICINE
Payer: COMMERCIAL

## 2018-10-04 ENCOUNTER — RECORDS - HEALTHEAST (OUTPATIENT)
Dept: ADMINISTRATIVE | Facility: OTHER | Age: 63
End: 2018-10-04

## 2018-10-04 ENCOUNTER — APPOINTMENT (OUTPATIENT)
Dept: LAB | Facility: CLINIC | Age: 63
End: 2018-10-04
Attending: INTERNAL MEDICINE
Payer: COMMERCIAL

## 2018-10-04 ENCOUNTER — ONCOLOGY VISIT (OUTPATIENT)
Dept: ONCOLOGY | Facility: CLINIC | Age: 63
End: 2018-10-04
Attending: INTERNAL MEDICINE
Payer: COMMERCIAL

## 2018-10-04 ENCOUNTER — RADIANT APPOINTMENT (OUTPATIENT)
Dept: MAMMOGRAPHY | Facility: CLINIC | Age: 63
End: 2018-10-04
Payer: COMMERCIAL

## 2018-10-04 VITALS
SYSTOLIC BLOOD PRESSURE: 192 MMHG | WEIGHT: 188.7 LBS | BODY MASS INDEX: 32.39 KG/M2 | RESPIRATION RATE: 16 BRPM | OXYGEN SATURATION: 98 % | TEMPERATURE: 97.6 F | HEART RATE: 87 BPM | DIASTOLIC BLOOD PRESSURE: 101 MMHG

## 2018-10-04 DIAGNOSIS — Z12.31 VISIT FOR SCREENING MAMMOGRAM: ICD-10-CM

## 2018-10-04 DIAGNOSIS — Z85.3 PERSONAL HISTORY OF MALIGNANT NEOPLASM OF BREAST: ICD-10-CM

## 2018-10-04 DIAGNOSIS — C50.412 MALIGNANT NEOPLASM OF UPPER-OUTER QUADRANT OF LEFT BREAST IN FEMALE, ESTROGEN RECEPTOR POSITIVE (H): Primary | ICD-10-CM

## 2018-10-04 DIAGNOSIS — C50.912 INFILTRATING DUCTAL CARCINOMA OF BREAST, LEFT (H): ICD-10-CM

## 2018-10-04 DIAGNOSIS — Z78.0 ASYMPTOMATIC POSTMENOPAUSAL STATUS: Primary | ICD-10-CM

## 2018-10-04 DIAGNOSIS — R93.89 THICKENED ENDOMETRIUM: ICD-10-CM

## 2018-10-04 DIAGNOSIS — C50.412 MALIGNANT NEOPLASM OF UPPER-OUTER QUADRANT OF LEFT BREAST IN FEMALE, ESTROGEN RECEPTOR POSITIVE (H): ICD-10-CM

## 2018-10-04 DIAGNOSIS — Z17.0 MALIGNANT NEOPLASM OF UPPER-OUTER QUADRANT OF LEFT BREAST IN FEMALE, ESTROGEN RECEPTOR POSITIVE (H): ICD-10-CM

## 2018-10-04 DIAGNOSIS — Z17.0 MALIGNANT NEOPLASM OF UPPER-OUTER QUADRANT OF LEFT BREAST IN FEMALE, ESTROGEN RECEPTOR POSITIVE (H): Primary | ICD-10-CM

## 2018-10-04 LAB
ALBUMIN SERPL-MCNC: 3.3 G/DL (ref 3.4–5)
ALP SERPL-CCNC: 76 U/L (ref 40–150)
ALT SERPL W P-5'-P-CCNC: 27 U/L (ref 0–50)
ANION GAP SERPL CALCULATED.3IONS-SCNC: 6 MMOL/L (ref 3–14)
AST SERPL W P-5'-P-CCNC: 22 U/L (ref 0–45)
BILIRUB SERPL-MCNC: 0.5 MG/DL (ref 0.2–1.3)
BUN SERPL-MCNC: 17 MG/DL (ref 7–30)
CALCIUM SERPL-MCNC: 8.8 MG/DL (ref 8.5–10.1)
CHLORIDE SERPL-SCNC: 108 MMOL/L (ref 94–109)
CO2 SERPL-SCNC: 27 MMOL/L (ref 20–32)
CREAT SERPL-MCNC: 0.82 MG/DL (ref 0.52–1.04)
ERYTHROCYTE [DISTWIDTH] IN BLOOD BY AUTOMATED COUNT: 12.3 % (ref 10–15)
GFR SERPL CREATININE-BSD FRML MDRD: 70 ML/MIN/1.7M2
GLUCOSE SERPL-MCNC: 83 MG/DL (ref 70–99)
HCT VFR BLD AUTO: 42.3 % (ref 35–47)
HGB BLD-MCNC: 14.4 G/DL (ref 11.7–15.7)
MCH RBC QN AUTO: 31 PG (ref 26.5–33)
MCHC RBC AUTO-ENTMCNC: 34 G/DL (ref 31.5–36.5)
MCV RBC AUTO: 91 FL (ref 78–100)
PLATELET # BLD AUTO: 229 10E9/L (ref 150–450)
POTASSIUM SERPL-SCNC: 3.8 MMOL/L (ref 3.4–5.3)
PROT SERPL-MCNC: 6.7 G/DL (ref 6.8–8.8)
RBC # BLD AUTO: 4.64 10E12/L (ref 3.8–5.2)
SODIUM SERPL-SCNC: 141 MMOL/L (ref 133–144)
WBC # BLD AUTO: 5.8 10E9/L (ref 4–11)

## 2018-10-04 PROCEDURE — G0463 HOSPITAL OUTPT CLINIC VISIT: HCPCS | Mod: ZF

## 2018-10-04 PROCEDURE — 36415 COLL VENOUS BLD VENIPUNCTURE: CPT

## 2018-10-04 PROCEDURE — 85027 COMPLETE CBC AUTOMATED: CPT | Performed by: OBSTETRICS & GYNECOLOGY

## 2018-10-04 PROCEDURE — 99213 OFFICE O/P EST LOW 20 MIN: CPT | Mod: GC | Performed by: INTERNAL MEDICINE

## 2018-10-04 PROCEDURE — 80053 COMPREHEN METABOLIC PANEL: CPT | Performed by: OBSTETRICS & GYNECOLOGY

## 2018-10-04 ASSESSMENT — PAIN SCALES - GENERAL: PAINLEVEL: NO PAIN (0)

## 2018-10-04 NOTE — NURSING NOTE
Chief Complaint   Patient presents with     Blood Draw     Venipuncture labs drawn, vitals completed, pt checked in for appt.      Ana Clay, CMA Student

## 2018-10-04 NOTE — LETTER
10/4/2018       RE: Cinthya Waite  [address]  [place] [address]     Dear Colleague,    Thank you for referring your patient, Cinthya Waite, to the [facility]. Please see a copy of my visit note below.    BOOKER Waite is a 60-year-old woman with a history of stage IIA, T2 N0 M0, ER positive, DC positive and HER-2 negative invasive ductal carcinoma of the left breast diagnosed in 2007.  She is status post lumpectomy 08/05/2007 followed by 4 cycles of Taxotere and Cyclophosphamide. Patient completed radiation therapy in 01/2008. She initiated Tamoxifen and was on it for one year and when she became post-menopausal, she was changed to Arimidex in 08/2008There is a small seroma at the site of the lumpectomy just above the incision.  It is also possible there could be some fat necrosis there.  This area of firmness has been stable.  She has been followed by our clinic every 6 months and we will alternate followup with Michelle Cole.  She has been followed by yearly mammographic surveillance.      INTERVAL HISTORY:  Cinthya returns to clinic.  She has been feeling entirely well.  She has no pain, no fatigue, no depression and no anxiety.      REVIEW OF SYSTEMS:  A 10-point review of systems is entirely negative.  She rode her bike to clinic and has been exercising more than 150 minutes per week and I commended her on her exercise.  She has been eating a healthful diet.  She consumes very little alcohol.  She is taking calcium and vitamin D. although I did recommend that she cut back the vitamin D from 5000 units a day to 2000.  She has been started on Prolia for osteoporosis and she is getting that every 6 months.      PHYSICAL EXAMINATION:   VITAL SIGNS:  Blood pressure 120/79, temperature 97, pulse 98, respirations 16, O2 sat 97% on room air, height 1.6 meters and weight 80 kg.   GENERAL:  Cinthya appeared generally well.  She has no alopecia.   HEENT:   No lesions in the oropharynx.   LYMPH:  There is no palpable cervical, supraclavicular, subclavicular or axillary lymphadenopathy.   BREASTS:  Examination of the right breast is without masses.  There was some fungal infection at the 5 o'clock position under the right breast and I recommended nystatin powder.  Examination of the left breast revealed a well-healed incision at the 11 o'clock position, 4 fingerbreadths from the nipple-areolar complex, without erythema or masses or seroma.  No masses in the left breast.  The left axillary incision is well-healed without erythema or masses.  Both nipples were inverted.   LUNGS:  Clear to percussion and auscultation.   HEART:  There is a regular rate and rhythm, S1, S2.   ABDOMEN:  Soft and nontender, without hepatosplenomegaly.   EXTREMITIES:  Without edema.   PSYCHIATRIC:  Mood and affect were normal.        LABORATORY DATA:  CBC and CMP were not obtained today, but were within normal limits on 08/01/2017.  She did have a CT scan of the chest with contrast which showed decreased size of the ground-glass nodular opacities in the right lower lobe favoring resolving infectious or inflammatory etiology.  A followup CT scan in 6 months was recommended.  Moderate hiatal hernia and unchanged soft tissue focus of the left lumpectomy site.  Continued yearly mammography was recommended.      ASSESSMENT AND PLAN:     1.  Cinthya Waite is a 61-year-old woman with a history of a stage IIA, T2 N0 M0, ER positive, NH positive and HER-2 negative invasive ductal carcinoma of the left breast diagnosed in 2007.  There is a small seroma at the lumpectomy site just above the incision, which is nearly completely resolved.  Our plan is to continue with every 6-month followup alternating with an HALEY.  We will continue with surveillance screening mammography only and she is due for a mammogram in a year.   2.  Hormonal therapy.  Cinthya is now off hormonal therapy after having completed 7-1/2  years.   3.  Small ground-glass nodular opacities in the right lower lobe, favoring resolving infection.  It is recommended to obtain a followup chest CT scan at 6 months.   4.  Continue with calcium and vitamin D, but decreased the vitamin D dose to 2000 international units daily.   5.  Continue with 150 minutes of exercise a week.   6.  Continue with a diet that is low in saturated fat.   7.  Followup.  She will see an HALEY in followup in 6 months with a chest CT and me in 6 months with a chest CT and a mammogram. Follow up with HALEY 4-5 with chest CT 4-4 and with me 10-4-18 with tomomammogram with CBC, CMP both visits. Left breast biopsy 8 AM 10-5-18.  CBC, CMP.  Pathology for ER, HI, HER2 FISH if required. Follow up with me 10-18-18.        Thank you for the opportunity to participate in Cinthya's care.    Thank you for allowing us to participate in this patient's care.  The patient was seen and evaluated by me.  I discussed the patient with the resident and agree with the findings and plan in the note, which was edited by me.    Sincerely,     Johnathan Herrera MD    [facility]  [phone].        She was seen and evaluated with staff, Dr. Herrera.    Francis Arechiga MD PGY-5  Radiation Oncology Resident, [facility]  Pager: [phone]      I spent 20 minutes with the patient more than 50% of which was in counseling and coordination of care.     Again, thank you for allowing me to participate in the care of your patient.      Sincerely,    Johnathan Herrera MD

## 2018-10-04 NOTE — NURSING NOTE
"Oncology Rooming Note    October 4, 2018 11:23 AM   Cinthya Waite is a 62 year old female who presents for:    Chief Complaint   Patient presents with     Blood Draw     Venipuncture labs drawn, vitals completed, pt checked in for appt.      Oncology Clinic Visit     return - breast ca      Initial Vitals: BP (!) 153/93  Pulse 87  Temp 97.6  F (36.4  C) (Oral)  Resp 16  Wt 85.6 kg (188 lb 11.2 oz)  LMP 06/07/2007  SpO2 98%  BMI 32.39 kg/m2 Estimated body mass index is 32.39 kg/(m^2) as calculated from the following:    Height as of 8/3/18: 1.626 m (5' 4\").    Weight as of this encounter: 85.6 kg (188 lb 11.2 oz). Body surface area is 1.97 meters squared.  No Pain (0) Comment: Data Unavailable   Patient's last menstrual period was 06/07/2007.  Allergies reviewed: Yes  Medications reviewed: Yes    Medications: Medication refills not needed today.  Pharmacy name entered into [facility]:    [id number] [place], [place] - [address] [facility] - [place], [place] - [address]    Clinical concerns: Go over results      6 minutes for nursing intake (face to face time)     Pamella Sinclair [facility]            "

## 2018-10-04 NOTE — MR AVS SNAPSHOT
After Visit Summary   10/4/2018    Cinthya Waite    MRN: [id number]           Patient Information     Date Of Birth          1955        Visit Information        Provider Department      10/4/2018 10:30 AM Johnathan Herrera MD [facility]        Today's Diagnoses     Thickened endometrium        Personal history of malignant neoplasm of breast        Infiltrating ductal carcinoma of breast, left (H)           Follow-ups after your visit        Follow-up notes from your care team     Return in about 2 weeks (around 10/18/2018).      Your next 10 appointments already scheduled     Oct 23, 2018  3:15 PM CDT   [facility] Draw with  [facility] [employer] LAB DRAW   [facility] Draw ([facility])    [address] [place] [address] Se  [address]  [facility] [address] [phone]            Oct 23, 2018  4:00 PM CDT   (Arrive by 3:45 PM)   Return Visit with Johnathan Herrera MD   [facility] ([facility])    [address] [place] [address] Se  [address]  [facility] [address] [phone]            Dec 20, 2018 10:00 AM CST   Return Visit with Cyndee Caballero MD   [facility] ([facility])    [place] [facility] [address]  [address]  [facility] [address] [phone]              Who to contact     If you have questions or need follow up information about today's clinic visit or your schedule please contact [facility] directly at [phone].  Normal or non-critical lab and imaging results will be communicated to you by MyChart, letter or phone within 4 business days after the clinic has received the results. If you do not hear from us within 7 days, please contact the clinic through [employer] or phone. If you have a critical or abnormal lab result, we will notify you by phone as soon as possible.  Submit refill requests through [employer]  or call your pharmacy and they will forward the refill request to us. Please allow 3 business days for your refill to be completed.          Additional Information About Your Visit        MyChart Information     [employer] gives you secure access to your electronic health record. If you see a primary care provider, you can also send messages to your care team and make appointments. If you have questions, please call your primary care clinic.  If you do not have a primary care provider, please call [phone] and they will assist you.        Care EveryWhere ID     This is your Care EveryWhere ID. This could be used by other organizations to access your [facility] medical records  [id number]        Your Vitals Were     Pulse Temperature Respirations Last Period Pulse Oximetry BMI (Body Mass Index)    87 97.6  F (36.4  C) (Oral) 16 06/07/2007 98% 32.39 kg/m2       Blood Pressure from Last 3 Encounters:   10/04/18 (!) 192/101   09/13/18 123/82   08/04/18 114/56    Weight from Last 3 Encounters:   10/04/18 85.6 kg (188 lb 11.2 oz)   09/13/18 83.9 kg (185 lb)   08/03/18 83.5 kg (184 lb 1.4 oz)              We Performed the Following     CBC with platelets     Comprehensive metabolic panel        Primary Care Provider Office Phone # Fax #    Heidi Valencia [phone]       [facility] [address] [address]        Equal Access to Services     [facility] : Hadii aad ku hadasho Soviji, waaxda luqadaha, qaybta kaalmada belle, veda richards. So [facility] [phone].    ATENCIÓN: Si habla español, tiene a ames disposición servicios gratuitos de asistencia lingüística. Trevon al [phone].    We comply with applicable federal civil rights laws and [place] laws. We do not discriminate on the basis of race, color, national origin, age, disability, sex, sexual orientation, or gender identity.            Thank you!     Thank you for choosing [facility]  for your care. Our goal is always to provide you with excellent care. Hearing back from our patients is one way we can continue to improve our services. Please take a few minutes to complete the written survey that you may receive in the mail after your visit with us. Thank you!             Your Updated Medication List - Protect others around you: Learn how to safely use, store and throw away your medicines at www.disposemymeds.org.          This list is accurate as of 10/4/18 11:59 PM.  Always use your most recent med list.                   Brand Name Dispense Instructions for use Diagnosis    acetaminophen 325 MG tablet    TYLENOL    30 tablet    Take 3 tablets (975 mg) by mouth every 6 hours as needed for mild pain    S/P laparoscopic hysterectomy       aspirin 81 MG tablet      Take 81 mg by mouth daily        BUPROBAN 150 MG 12 hr tablet   Generic drug:  buPROPion      Take 150 mg by mouth once        BUSPAR 10 MG tablet   Generic drug:  busPIRone      Take 10 mg by mouth daily.        Calcium Citrate 200 MG Tabs      Take 1 tablet by mouth daily        clobetasol 0.05 % ointment    TEMOVATE    45 g    Apply in thin layer to vulva as instructed, twice weekly.  Do not apply to face.    Lichen sclerosus       estradiol 10 MCG Tabs vaginal tablet    VAGIFEM    8 tablet    Place 1 tablet (10 mcg) vaginally twice a week for 8 doses    Vaginal atrophy       hydrochlorothiazide 25 MG tablet    HYDRODIURIL     Take 1 tablet by mouth daily.        levothyroxine 125 MCG tablet    SYNTHROID/LEVOTHROID     165 mcg daily        LIPITOR 40 MG tablet   Generic drug:  atorvastatin      Take 40 mg by mouth daily.        PROLIA SC           Vitamin D (Cholecalciferol) 1000 units Tabs      Take 2,000 Units by mouth daily

## 2018-10-05 ENCOUNTER — RADIANT APPOINTMENT (OUTPATIENT)
Dept: MAMMOGRAPHY | Facility: CLINIC | Age: 63
End: 2018-10-05
Attending: INTERNAL MEDICINE
Payer: COMMERCIAL

## 2018-10-05 DIAGNOSIS — Z17.0 MALIGNANT NEOPLASM OF UPPER-OUTER QUADRANT OF LEFT BREAST IN FEMALE, ESTROGEN RECEPTOR POSITIVE (H): ICD-10-CM

## 2018-10-05 DIAGNOSIS — C50.412 MALIGNANT NEOPLASM OF UPPER-OUTER QUADRANT OF LEFT BREAST IN FEMALE, ESTROGEN RECEPTOR POSITIVE (H): ICD-10-CM

## 2018-10-05 RX ORDER — LIDOCAINE HYDROCHLORIDE AND EPINEPHRINE 10; 10 MG/ML; UG/ML
10 INJECTION, SOLUTION INFILTRATION; PERINEURAL ONCE
Status: COMPLETED | OUTPATIENT
Start: 2018-10-05 | End: 2018-10-05

## 2018-10-05 RX ORDER — LIDOCAINE HYDROCHLORIDE 10 MG/ML
10 INJECTION, SOLUTION EPIDURAL; INFILTRATION; INTRACAUDAL; PERINEURAL ONCE
Status: COMPLETED | OUTPATIENT
Start: 2018-10-05 | End: 2018-10-05

## 2018-10-05 RX ADMIN — LIDOCAINE HYDROCHLORIDE 10 ML: 10 INJECTION, SOLUTION EPIDURAL; INFILTRATION; INTRACAUDAL; PERINEURAL at 09:16

## 2018-10-05 RX ADMIN — LIDOCAINE HYDROCHLORIDE AND EPINEPHRINE 10 ML: 10; 10 INJECTION, SOLUTION INFILTRATION; PERINEURAL at 09:16

## 2018-10-10 ENCOUNTER — RADIANT APPOINTMENT (OUTPATIENT)
Dept: MAMMOGRAPHY | Facility: CLINIC | Age: 63
End: 2018-10-10
Attending: OBSTETRICS & GYNECOLOGY
Payer: COMMERCIAL

## 2018-10-10 ENCOUNTER — TELEPHONE (OUTPATIENT)
Dept: MAMMOGRAPHY | Facility: CLINIC | Age: 63
End: 2018-10-10

## 2018-10-10 ENCOUNTER — TELEPHONE (OUTPATIENT)
Dept: ONCOLOGY | Facility: CLINIC | Age: 63
End: 2018-10-10

## 2018-10-10 DIAGNOSIS — R92.8 ABNORMAL MAMMOGRAM: ICD-10-CM

## 2018-10-10 NOTE — TELEPHONE ENCOUNTER
Spoke to Cinthya this afternoon regarding the status of her breast biopsy results.  The Pathologists want to do additional testing on her tissue so the results may take a few more days.  Cinthya verbalized understanding and writer will contact her when the final results become available.

## 2018-10-11 NOTE — TELEPHONE ENCOUNTER
I called Cinthya and let her know that the biopsy showed benign findings.     Johnathan Herrera MD

## 2018-10-15 ENCOUNTER — TELEPHONE (OUTPATIENT)
Dept: MAMMOGRAPHY | Facility: CLINIC | Age: 63
End: 2018-10-15

## 2018-10-15 LAB — COPATH REPORT: NORMAL

## 2018-10-15 NOTE — TELEPHONE ENCOUNTER
Spoke to Cinthya this afternoon regarding the benign findings from her breast biopsy done on 10/5/18.  We discussed the Radiologist's recommendation of continuing on with her yearly screening mammograms.  Cinthya verbalized understanding and all questions and concerns were answered at this time.

## 2018-10-27 ENCOUNTER — COMMUNICATION - HEALTHEAST (OUTPATIENT)
Dept: INTERNAL MEDICINE | Facility: CLINIC | Age: 63
End: 2018-10-27

## 2018-10-27 DIAGNOSIS — F34.1 DYSTHYMIC DISORDER: ICD-10-CM

## 2018-10-31 ENCOUNTER — COMMUNICATION - HEALTHEAST (OUTPATIENT)
Dept: INTERNAL MEDICINE | Facility: CLINIC | Age: 63
End: 2018-10-31

## 2018-10-31 DIAGNOSIS — F34.1 DYSTHYMIA: ICD-10-CM

## 2018-11-02 ENCOUNTER — COMMUNICATION - HEALTHEAST (OUTPATIENT)
Dept: INTERNAL MEDICINE | Facility: CLINIC | Age: 63
End: 2018-11-02

## 2018-11-02 DIAGNOSIS — F34.1 DYSTHYMIA: ICD-10-CM

## 2018-11-02 DIAGNOSIS — F34.1 DYSTHYMIC DISORDER: ICD-10-CM

## 2018-11-07 ENCOUNTER — COMMUNICATION - HEALTHEAST (OUTPATIENT)
Dept: INTERNAL MEDICINE | Facility: CLINIC | Age: 63
End: 2018-11-07

## 2018-11-20 ENCOUNTER — AMBULATORY - HEALTHEAST (OUTPATIENT)
Dept: NURSING | Facility: CLINIC | Age: 63
End: 2018-11-20

## 2018-11-20 ENCOUNTER — OFFICE VISIT - HEALTHEAST (OUTPATIENT)
Dept: INTERNAL MEDICINE | Facility: CLINIC | Age: 63
End: 2018-11-20

## 2018-11-20 DIAGNOSIS — I10 ESSENTIAL HYPERTENSION: ICD-10-CM

## 2018-11-20 DIAGNOSIS — E03.9 ACQUIRED HYPOTHYROIDISM: ICD-10-CM

## 2018-11-20 DIAGNOSIS — E78.00 HYPERCHOLESTEROLEMIA: ICD-10-CM

## 2018-12-09 ENCOUNTER — COMMUNICATION - HEALTHEAST (OUTPATIENT)
Dept: INTERNAL MEDICINE | Facility: CLINIC | Age: 63
End: 2018-12-09

## 2018-12-10 ENCOUNTER — AMBULATORY - HEALTHEAST (OUTPATIENT)
Dept: INTERNAL MEDICINE | Facility: CLINIC | Age: 63
End: 2018-12-10

## 2018-12-18 ENCOUNTER — MYC MEDICAL ADVICE (OUTPATIENT)
Dept: ONCOLOGY | Facility: CLINIC | Age: 63
End: 2018-12-18

## 2018-12-20 ENCOUNTER — RECORDS - HEALTHEAST (OUTPATIENT)
Dept: ADMINISTRATIVE | Facility: OTHER | Age: 63
End: 2018-12-20

## 2018-12-20 ENCOUNTER — OFFICE VISIT (OUTPATIENT)
Dept: OBGYN | Facility: CLINIC | Age: 63
End: 2018-12-20
Attending: OBSTETRICS & GYNECOLOGY
Payer: COMMERCIAL

## 2018-12-20 VITALS
HEART RATE: 76 BPM | DIASTOLIC BLOOD PRESSURE: 84 MMHG | BODY MASS INDEX: 30.9 KG/M2 | WEIGHT: 180 LBS | SYSTOLIC BLOOD PRESSURE: 123 MMHG

## 2018-12-20 DIAGNOSIS — L90.0 LICHEN SCLEROSUS: Primary | ICD-10-CM

## 2018-12-20 DIAGNOSIS — N95.2 VAGINAL ATROPHY: ICD-10-CM

## 2018-12-20 RX ORDER — CLOBETASOL PROPIONATE 0.5 MG/G
OINTMENT TOPICAL
Qty: 45 G | Refills: 3 | Status: SHIPPED | OUTPATIENT
Start: 2018-12-20 | End: 2021-01-05

## 2018-12-20 NOTE — LETTER
2018       RE: Cinthya Waite  [address]  [place] [address]     Dear Colleague,    Thank you for referring your patient, Cinthya Waite, to the [facility] at [facility]. Please see a copy of my visit note below.    64 yo with history of lichen sclerosus documented in visits since , but with no prior biopsy presents for follow-up.  She is not sexually active, although states she may want to be in the future.  She has no voiding dysfunction.  She used vaginal estrogen for a short time after her hysterectomy and has not been using the clobetasol ointment regularly (maybe once every 2 weeks).       ROS: 10 point ROS neg other than the symptoms noted above in the HPI.    Past Medical History:   Diagnosis Date     Abnormal Pap smear      Breast cancer (H) 2007    T1N0 left breast cancer     Depressive disorder, not elsewhere classified     Buspar, wellbutrin zoloft  sees Dr. Lim      Lichen sclerosus et atrophicus      Pure hypercholesterolemia     Lipitor     Raynaud's disease      Unspecified essential hypertension      Unspecified hypothyroidism     synthroid     Past Surgical History:   Procedure Laterality Date     ABDOMEN SURGERY      c- section x 2     APPENDECTOMY       BIOPSY       BREAST LUMPECTOMY, RT/LT  2007    Breast Lumpectomy /LT     C NONSPECIFIC PROCEDURE  10/82,     x2     C NONSPECIFIC PROCEDURE      Appendicitis     C NONSPECIFIC PROCEDURE      Hysteroscopy, D & C     COLONOSCOPY       CYSTOSCOPY N/A 8/3/2018    Procedure: CYSTOSCOPY;;  Surgeon: Cyndee Caballero MD;  Location: [facility] OR     DAVINCI HYSTERECTOMY TOTAL, BILATERAL SALPINGO-OOPHORECTOMY, COMBINED Bilateral 8/3/2018    Procedure: COMBINED DAVINCI HYSTERECTOMY TOTAL, SALPINGO-OOPHORECTOMY;  Davinci Total Laparoscopic Hysterectomy, Bilateral Salpingo Oophorectomy, and Cystoscopy ;  Surgeon: Cyndee Caballero  MD Bonnie;  Location: [facility] OR     DILATION AND CURETTAGE, OPERATIVE HYSTEROSCOPY WITH MORCELLATOR, COMBINED N/A 10/28/2015    Procedure: COMBINED DILATION AND CURETTAGE, OPERATIVE HYSTEROSCOPY WITH MORCELLATOR;  Surgeon: Cyndee Wyman MD;  Location: [facility] OR     Physical exam:  /84   Pulse 76   Wt 81.6 kg (180 lb)   LMP 06/07/2007   Breastfeeding? No   BMI 30.90 kg/m     Gen'l: appears well  Abd: soft, NT, ND, well healed incisions  Vulva: agglutination of bilateral labia minora, adhesion of clitoral valencia and lichenification of perineum and perianal area.  At introitus are 2 purple ecchymosis lesions at 4 and 7 o'clock, unchanged  Urethra: normal  Vagina: introitus is narrow, unable to insert speculum,vaginal adhesions are present, broken with finger, cuff palpates smooth.  Cervix/Uterus: absent  Adnexa: no palpable masses    Assessment/Plan:  Lichen Sclerosus  Vaginal atrophy and adhesions    - reviewed importance of clobetasol to manage LSA, encourage twice weekly use  - may require further vaginal estrogen if desires sexual activity; although has history of ER + breast CA  - RTC 2 months.    Cyndee Caballero MD

## 2018-12-21 NOTE — PROGRESS NOTES
62 yo with history of lichen sclerosus documented in visits since , but with no prior biopsy presents for follow-up.  She is not sexually active, although states she may want to be in the future.  She has no voiding dysfunction.  She used vaginal estrogen for a short time after her hysterectomy and has not been using the clobetasol ointment regularly (maybe once every 2 weeks).       ROS: 10 point ROS neg other than the symptoms noted above in the HPI.    Past Medical History:   Diagnosis Date     Abnormal Pap smear      Breast cancer (H) 2007    T1N0 left breast cancer     Depressive disorder, not elsewhere classified     Buspar, wellbutrin zoloft  sees Dr. Lim      Lichen sclerosus et atrophicus      Pure hypercholesterolemia     Lipitor     Raynaud's disease      Unspecified essential hypertension      Unspecified hypothyroidism     synthroid     Past Surgical History:   Procedure Laterality Date     ABDOMEN SURGERY      c- section x 2     APPENDECTOMY       BIOPSY       BREAST LUMPECTOMY, RT/LT  2007    Breast Lumpectomy /LT     C NONSPECIFIC PROCEDURE  10/82,     x2     C NONSPECIFIC PROCEDURE      Appendicitis     C NONSPECIFIC PROCEDURE      Hysteroscopy, D & C     COLONOSCOPY       CYSTOSCOPY N/A 8/3/2018    Procedure: CYSTOSCOPY;;  Surgeon: Cyndee Caballero MD;  [facility]     DAVINCI HYSTERECTOMY TOTAL, BILATERAL SALPINGO-OOPHORECTOMY, COMBINED Bilateral 8/3/2018    Procedure: COMBINED DAVINCI HYSTERECTOMY TOTAL, SALPINGO-OOPHORECTOMY;  Davinci Total Laparoscopic Hysterectomy, Bilateral Salpingo Oophorectomy, and Cystoscopy ;  Surgeon: Cyndee Caballero MD;  [facility]     DILATION AND CURETTAGE, OPERATIVE HYSTEROSCOPY WITH MORCELLATOR, COMBINED N/A 10/28/2015    Procedure: COMBINED DILATION AND CURETTAGE, OPERATIVE HYSTEROSCOPY WITH MORCELLATOR;  Surgeon: Cyndee Wyman MD;  [facility]     Physical exam:  /84   Pulse 76   Wt 81.6 kg  (180 lb)   LMP 06/07/2007   Breastfeeding? No   BMI 30.90 kg/m    Gen'l: appears well  Abd: soft, NT, ND, well healed incisions  Vulva: agglutination of bilateral labia minora, adhesion of clitoral valencia and lichenification of perineum and perianal area.  At introitus are 2 purple ecchymosis lesions at 4 and 7 o'clock, unchanged  Urethra: normal  Vagina: introitus is narrow, unable to insert speculum,vaginal adhesions are present, broken with finger, cuff palpates smooth.  Cervix/Uterus: absent  Adnexa: no palpable masses    Assessment/Plan:  Lichen Sclerosus  Vaginal atrophy and adhesions    - reviewed importance of clobetasol to manage LSA, encourage twice weekly use  - may require further vaginal estrogen if desires sexual activity; although has history of ER + breast CA  - RTC 2 months.    Cyndee Caballero MD

## 2018-12-26 ENCOUNTER — COMMUNICATION - HEALTHEAST (OUTPATIENT)
Dept: INTERNAL MEDICINE | Facility: CLINIC | Age: 63
End: 2018-12-26

## 2018-12-28 ENCOUNTER — RECORDS - HEALTHEAST (OUTPATIENT)
Dept: ADMINISTRATIVE | Facility: OTHER | Age: 63
End: 2018-12-28

## 2019-01-05 ENCOUNTER — COMMUNICATION - HEALTHEAST (OUTPATIENT)
Dept: INTERNAL MEDICINE | Facility: CLINIC | Age: 64
End: 2019-01-05

## 2019-01-07 ENCOUNTER — COMMUNICATION - HEALTHEAST (OUTPATIENT)
Dept: INTERNAL MEDICINE | Facility: CLINIC | Age: 64
End: 2019-01-07

## 2019-01-07 ENCOUNTER — AMBULATORY - HEALTHEAST (OUTPATIENT)
Dept: INTERNAL MEDICINE | Facility: CLINIC | Age: 64
End: 2019-01-07

## 2019-01-07 DIAGNOSIS — R30.0 DYSURIA: ICD-10-CM

## 2019-01-07 DIAGNOSIS — E78.00 HYPERCHOLESTEROLEMIA: ICD-10-CM

## 2019-01-07 DIAGNOSIS — M94.9 DISORDER OF BONE AND CARTILAGE: ICD-10-CM

## 2019-01-07 DIAGNOSIS — I10 ESSENTIAL HYPERTENSION: ICD-10-CM

## 2019-01-07 DIAGNOSIS — Z00.00 PREVENTATIVE HEALTH CARE: ICD-10-CM

## 2019-01-07 DIAGNOSIS — M89.9 DISORDER OF BONE AND CARTILAGE: ICD-10-CM

## 2019-01-09 ENCOUNTER — AMBULATORY - HEALTHEAST (OUTPATIENT)
Dept: LAB | Facility: CLINIC | Age: 64
End: 2019-01-09

## 2019-01-09 DIAGNOSIS — M94.9 DISORDER OF BONE AND CARTILAGE: ICD-10-CM

## 2019-01-09 DIAGNOSIS — Z00.00 PREVENTATIVE HEALTH CARE: ICD-10-CM

## 2019-01-09 DIAGNOSIS — E78.00 HYPERCHOLESTEROLEMIA: ICD-10-CM

## 2019-01-09 DIAGNOSIS — M89.9 DISORDER OF BONE AND CARTILAGE: ICD-10-CM

## 2019-01-09 DIAGNOSIS — R30.0 DYSURIA: ICD-10-CM

## 2019-01-09 LAB
ALBUMIN SERPL-MCNC: 3.5 G/DL (ref 3.5–5)
ALBUMIN UR-MCNC: NEGATIVE MG/DL
ALP SERPL-CCNC: 81 U/L (ref 45–120)
ALT SERPL W P-5'-P-CCNC: 15 U/L (ref 0–45)
ANION GAP SERPL CALCULATED.3IONS-SCNC: 7 MMOL/L (ref 5–18)
APPEARANCE UR: CLEAR
AST SERPL W P-5'-P-CCNC: 16 U/L (ref 0–40)
BACTERIA #/AREA URNS HPF: ABNORMAL HPF
BILIRUB SERPL-MCNC: 0.6 MG/DL (ref 0–1)
BILIRUB UR QL STRIP: NEGATIVE
BUN SERPL-MCNC: 14 MG/DL (ref 8–22)
CALCIUM SERPL-MCNC: 8.6 MG/DL (ref 8.5–10.5)
CHLORIDE BLD-SCNC: 108 MMOL/L (ref 98–107)
CHOLEST SERPL-MCNC: 212 MG/DL
CO2 SERPL-SCNC: 28 MMOL/L (ref 22–31)
COLOR UR AUTO: YELLOW
CREAT SERPL-MCNC: 0.81 MG/DL (ref 0.6–1.1)
FASTING STATUS PATIENT QL REPORTED: YES
GFR SERPL CREATININE-BSD FRML MDRD: >60 ML/MIN/1.73M2
GLUCOSE BLD-MCNC: 81 MG/DL (ref 70–125)
GLUCOSE UR STRIP-MCNC: NEGATIVE MG/DL
HBA1C MFR BLD: 5.3 % (ref 3.5–6)
HDLC SERPL-MCNC: 57 MG/DL
HGB UR QL STRIP: ABNORMAL
KETONES UR STRIP-MCNC: NEGATIVE MG/DL
LDLC SERPL CALC-MCNC: 131 MG/DL
LEUKOCYTE ESTERASE UR QL STRIP: ABNORMAL
NITRATE UR QL: NEGATIVE
PH UR STRIP: 7 [PH] (ref 5–8)
POTASSIUM BLD-SCNC: 3.9 MMOL/L (ref 3.5–5)
PROT SERPL-MCNC: 6.2 G/DL (ref 6–8)
RBC #/AREA URNS AUTO: ABNORMAL HPF
SODIUM SERPL-SCNC: 143 MMOL/L (ref 136–145)
SP GR UR STRIP: 1.02 (ref 1–1.03)
SQUAMOUS #/AREA URNS AUTO: ABNORMAL LPF
TRANS CELLS #/AREA URNS HPF: ABNORMAL LPF
TRIGL SERPL-MCNC: 118 MG/DL
TSH SERPL DL<=0.005 MIU/L-ACNC: 1.12 UIU/ML (ref 0.3–5)
UROBILINOGEN UR STRIP-ACNC: ABNORMAL
WBC #/AREA URNS AUTO: ABNORMAL HPF

## 2019-01-10 LAB
25(OH)D3 SERPL-MCNC: 52.5 NG/ML (ref 30–80)
25(OH)D3 SERPL-MCNC: 52.5 NG/ML (ref 30–80)

## 2019-01-12 LAB — BACTERIA SPEC CULT: ABNORMAL

## 2019-01-29 ENCOUNTER — ANCILLARY PROCEDURE (OUTPATIENT)
Dept: BONE DENSITY | Facility: CLINIC | Age: 64
End: 2019-01-29
Payer: MEDICARE

## 2019-01-29 DIAGNOSIS — Z78.0 ASYMPTOMATIC POSTMENOPAUSAL STATUS: ICD-10-CM

## 2019-03-07 ENCOUNTER — COMMUNICATION - HEALTHEAST (OUTPATIENT)
Dept: INTERNAL MEDICINE | Facility: CLINIC | Age: 64
End: 2019-03-07

## 2019-03-13 ENCOUNTER — COMMUNICATION - HEALTHEAST (OUTPATIENT)
Dept: INTERNAL MEDICINE | Facility: CLINIC | Age: 64
End: 2019-03-13

## 2019-03-13 ENCOUNTER — OFFICE VISIT - HEALTHEAST (OUTPATIENT)
Dept: INTERNAL MEDICINE | Facility: CLINIC | Age: 64
End: 2019-03-13

## 2019-03-13 DIAGNOSIS — M81.0 AGE-RELATED OSTEOPOROSIS WITHOUT CURRENT PATHOLOGICAL FRACTURE: ICD-10-CM

## 2019-03-13 DIAGNOSIS — Z00.00 PREVENTATIVE HEALTH CARE: ICD-10-CM

## 2019-03-13 DIAGNOSIS — I10 ESSENTIAL HYPERTENSION: ICD-10-CM

## 2019-03-13 DIAGNOSIS — L50.9 HIVES: ICD-10-CM

## 2019-03-13 DIAGNOSIS — Z00.00 WELCOME TO MEDICARE PREVENTIVE VISIT: ICD-10-CM

## 2019-03-13 DIAGNOSIS — E78.00 HYPERCHOLESTEROLEMIA: ICD-10-CM

## 2019-03-13 DIAGNOSIS — E03.9 ACQUIRED HYPOTHYROIDISM: ICD-10-CM

## 2019-03-13 DIAGNOSIS — M19.90 ARTHRITIS: ICD-10-CM

## 2019-03-13 DIAGNOSIS — C50.912 MALIGNANT NEOPLASM OF LEFT FEMALE BREAST, UNSPECIFIED ESTROGEN RECEPTOR STATUS, UNSPECIFIED SITE OF BREAST (H): ICD-10-CM

## 2019-03-13 LAB
ALBUMIN SERPL-MCNC: 3.9 G/DL (ref 3.5–5)
ALP SERPL-CCNC: 80 U/L (ref 45–120)
ALT SERPL W P-5'-P-CCNC: 33 U/L (ref 0–45)
ANION GAP SERPL CALCULATED.3IONS-SCNC: 14 MMOL/L (ref 5–18)
AST SERPL W P-5'-P-CCNC: 24 U/L (ref 0–40)
ATRIAL RATE - MUSE: 81 BPM
BILIRUB SERPL-MCNC: 0.7 MG/DL (ref 0–1)
BUN SERPL-MCNC: 18 MG/DL (ref 8–22)
CALCIUM SERPL-MCNC: 9.6 MG/DL (ref 8.5–10.5)
CHLORIDE BLD-SCNC: 106 MMOL/L (ref 98–107)
CHOLEST SERPL-MCNC: 252 MG/DL
CO2 SERPL-SCNC: 25 MMOL/L (ref 22–31)
CREAT SERPL-MCNC: 0.87 MG/DL (ref 0.6–1.1)
DIASTOLIC BLOOD PRESSURE - MUSE: NORMAL MMHG
ERYTHROCYTE [DISTWIDTH] IN BLOOD BY AUTOMATED COUNT: 10.9 % (ref 11–14.5)
FASTING STATUS PATIENT QL REPORTED: YES
GFR SERPL CREATININE-BSD FRML MDRD: >60 ML/MIN/1.73M2
GLUCOSE BLD-MCNC: 95 MG/DL (ref 70–125)
HCT VFR BLD AUTO: 46.5 % (ref 35–47)
HDLC SERPL-MCNC: 63 MG/DL
HGB BLD-MCNC: 15.9 G/DL (ref 12–16)
INTERPRETATION ECG - MUSE: NORMAL
LDLC SERPL CALC-MCNC: 149 MG/DL
MCH RBC QN AUTO: 32.6 PG (ref 27–34)
MCHC RBC AUTO-ENTMCNC: 34.2 G/DL (ref 32–36)
MCV RBC AUTO: 95 FL (ref 80–100)
P AXIS - MUSE: 54 DEGREES
PLATELET # BLD AUTO: 236 THOU/UL (ref 140–440)
PMV BLD AUTO: 8.5 FL (ref 7–10)
POTASSIUM BLD-SCNC: 3.7 MMOL/L (ref 3.5–5)
PR INTERVAL - MUSE: 124 MS
PROT SERPL-MCNC: 6.9 G/DL (ref 6–8)
QRS DURATION - MUSE: 134 MS
QT - MUSE: 434 MS
QTC - MUSE: 504 MS
R AXIS - MUSE: -15 DEGREES
RBC # BLD AUTO: 4.87 MILL/UL (ref 3.8–5.4)
SODIUM SERPL-SCNC: 145 MMOL/L (ref 136–145)
SYSTOLIC BLOOD PRESSURE - MUSE: NORMAL MMHG
T AXIS - MUSE: 95 DEGREES
TRIGL SERPL-MCNC: 199 MG/DL
TSH SERPL DL<=0.005 MIU/L-ACNC: 1.05 UIU/ML (ref 0.3–5)
URATE SERPL-MCNC: 6.2 MG/DL (ref 2–7.5)
VENTRICULAR RATE- MUSE: 81 BPM
WBC: 6.9 THOU/UL (ref 4–11)

## 2019-03-13 ASSESSMENT — MIFFLIN-ST. JEOR: SCORE: 1375.88

## 2019-03-14 LAB
25(OH)D3 SERPL-MCNC: 34.5 NG/ML (ref 30–80)
25(OH)D3 SERPL-MCNC: 34.5 NG/ML (ref 30–80)
CLAM IGE QN: <0.35 KU/L
LOBSTER IGE QN: <0.35 KU/L
SALMON IGE QN: <0.35 KU/L
SCALLOP IGE QN: <0.35 KU/L
SHRIMP IGE QN: <0.35 KU/L
TUNA IGE QN: <0.35 KU/L

## 2019-03-19 ENCOUNTER — HOSPITAL ENCOUNTER (OUTPATIENT)
Dept: ULTRASOUND IMAGING | Facility: CLINIC | Age: 64
Discharge: HOME | End: 2019-03-19
Attending: INTERNAL MEDICINE

## 2019-03-19 DIAGNOSIS — Z00.00 PREVENTATIVE HEALTH CARE: ICD-10-CM

## 2019-03-21 ENCOUNTER — RECORDS - HEALTHEAST (OUTPATIENT)
Dept: ADMINISTRATIVE | Facility: OTHER | Age: 64
End: 2019-03-21

## 2019-03-21 ENCOUNTER — OFFICE VISIT (OUTPATIENT)
Dept: OBGYN | Facility: CLINIC | Age: 64
End: 2019-03-21
Attending: OBSTETRICS & GYNECOLOGY
Payer: COMMERCIAL

## 2019-03-21 VITALS
SYSTOLIC BLOOD PRESSURE: 121 MMHG | BODY MASS INDEX: 32.87 KG/M2 | HEART RATE: 76 BPM | DIASTOLIC BLOOD PRESSURE: 81 MMHG | WEIGHT: 191.5 LBS

## 2019-03-21 DIAGNOSIS — L90.0 LICHEN SCLEROSUS: Primary | ICD-10-CM

## 2019-03-21 DIAGNOSIS — N95.2 VAGINAL ATROPHY: ICD-10-CM

## 2019-03-21 RX ORDER — ESTRADIOL 0.1 MG/G
10 CREAM VAGINAL
COMMUNITY
End: 2019-04-30

## 2019-03-21 ASSESSMENT — PAIN SCALES - GENERAL: PAINLEVEL: NO PAIN (0)

## 2019-03-21 NOTE — LETTER
RE: Cinthya Waite  [address]  [place] [address]     Dear Colleague,    Thank you for referring your patient, Cinthya Waite, to the [facility] at [facility]. Please see a copy of my visit note below.    62 yo s/p hysterectomy her for follow-up of lichen sclerosus. She has been more diligent about using the clobetasol and feels that symptoms are improving.  She notes much less pruritis and is able to insert a finger in the vagina with application of the clobetasol.    She continues to deny difficulty with voiding.    Past Medical History:   Diagnosis Date     Abnormal Pap smear      Breast cancer (H) 2007    T1N0 left breast cancer     Depressive disorder, not elsewhere classified     Buspar, wellbutrin zoloft  sees Dr. Lim      Lichen sclerosus et atrophicus      Pure hypercholesterolemia     Lipitor     Raynaud's disease      Unspecified essential hypertension      Unspecified hypothyroidism     synthroid     Past Surgical History:   Procedure Laterality Date     ABDOMEN SURGERY      c- section x 2     APPENDECTOMY       BIOPSY       BREAST LUMPECTOMY, RT/LT  2007    Breast Lumpectomy /LT     C NONSPECIFIC PROCEDURE  10/82,     x2     C NONSPECIFIC PROCEDURE      Appendicitis     C NONSPECIFIC PROCEDURE      Hysteroscopy, D & C     COLONOSCOPY       CYSTOSCOPY N/A 8/3/2018    Procedure: CYSTOSCOPY;;  Surgeon: Cyndee Caballero MD;  [facility]     DAVINCI HYSTERECTOMY TOTAL, BILATERAL SALPINGO-OOPHORECTOMY, COMBINED Bilateral 8/3/2018    Procedure: COMBINED DAVINCI HYSTERECTOMY TOTAL, SALPINGO-OOPHORECTOMY;  Davinci Total Laparoscopic Hysterectomy, Bilateral Salpingo Oophorectomy, and Cystoscopy ;  Surgeon: Cyndee Caballero MD;  [facility]     DILATION AND CURETTAGE, OPERATIVE HYSTEROSCOPY WITH MORCELLATOR, COMBINED N/A 10/28/2015    Procedure: COMBINED DILATION AND  CURETTAGE, OPERATIVE HYSTEROSCOPY WITH MORCELLATOR;  Surgeon: Cyndee Wyman MD;  [facility]     Family History   Problem Relation Age of Onset     Neurologic Disorder Mother         migraines     Breast Cancer Mother      Heart Disease Father      Diabetes Father      Hypertension Father      Cerebrovascular Disease Father      Thyroid Disease Father      Heart Disease Sister      Heart Disease Brother      Genitourinary Problems Sister         gallstones     Depression Sister      Depression Brother      Depression Sister      Depression Brother      Diabetes Brother      Physical exam:  /81   Pulse 76   Wt 86.9 kg (191 lb 8 oz)   LMP 06/07/2007   Breastfeeding? No   BMI 32.87 kg/m     Gen'l: appears well  Urethra: visible at introitus, appears normal  Vulva: agglutination of bilateral labia minora, adhesion of clitoral valencia and lichenification of perineum and perianal area. Introitus is more patent than prior exam. A small purple lesion on left labium majus at level of clitoris, small, prior purple lesions around introitus are no longer present.  Vagina: introitus is narrow,  did not try to insert speculum,vaginal adhesions are present, but less than previous and not as much bleeding with exam. Cuff palpates smooth.  Cervix/Uterus: absent  Adnexa: no palpable masses    Assessment/Plan:  Lichen Sclerosus  Vaginal atrophy     - Continue twice weekly clobetasol ointment  - If desires vaginal intercourse would recommend dilators, patient declines for now.  - History of breast cancer, but may consider topical vaginal estrogen if symptoms of vaginal atrophy worsen.  - RTC 3 months    Again, thank you for allowing me to participate in the care of your patient.      Sincerely,    Cyndee Caballero MD

## 2019-03-21 NOTE — PROGRESS NOTES
62 yo s/p hysterectomy her for follow-up of lichen sclerosus. She has been more diligent about using the clobetasol and feels that symptoms are improving.  She notes much less pruritis and is able to insert a finger in the vagina with application of the clobetasol.    She continues to deny difficulty with voiding.     ROS: 10 point ROS neg other than the symptoms noted above in the HPI.    Past Medical History:   Diagnosis Date     Abnormal Pap smear      Breast cancer (H) 2007    T1N0 left breast cancer     Depressive disorder, not elsewhere classified     Buspar, wellbutrin zoloft  sees Dr. Lim      Lichen sclerosus et atrophicus      Pure hypercholesterolemia     Lipitor     Raynaud's disease      Unspecified essential hypertension      Unspecified hypothyroidism     synthroid     Past Surgical History:   Procedure Laterality Date     ABDOMEN SURGERY      c- section x 2     APPENDECTOMY       BIOPSY       BREAST LUMPECTOMY, RT/LT  2007    Breast Lumpectomy /LT     C NONSPECIFIC PROCEDURE  10/82,     x2     C NONSPECIFIC PROCEDURE      Appendicitis     C NONSPECIFIC PROCEDURE      Hysteroscopy, D & C     COLONOSCOPY       CYSTOSCOPY N/A 8/3/2018    Procedure: CYSTOSCOPY;;  Surgeon: Cyndee Caballero MD;  [facility]     DAVINCI HYSTERECTOMY TOTAL, BILATERAL SALPINGO-OOPHORECTOMY, COMBINED Bilateral 8/3/2018    Procedure: COMBINED DAVINCI HYSTERECTOMY TOTAL, SALPINGO-OOPHORECTOMY;  Davinci Total Laparoscopic Hysterectomy, Bilateral Salpingo Oophorectomy, and Cystoscopy ;  Surgeon: Cyndee Caballero MD;  [facility]     DILATION AND CURETTAGE, OPERATIVE HYSTEROSCOPY WITH MORCELLATOR, COMBINED N/A 10/28/2015    Procedure: COMBINED DILATION AND CURETTAGE, OPERATIVE HYSTEROSCOPY WITH MORCELLATOR;  Surgeon: Cyndee Wyman MD;  [facility]     Family History   Problem Relation Age of Onset     Neurologic Disorder Mother         migraines     Breast Cancer Mother       Heart Disease Father      Diabetes Father      Hypertension Father      Cerebrovascular Disease Father      Thyroid Disease Father      Heart Disease Sister      Heart Disease Brother      Genitourinary Problems Sister         gallstones     Depression Sister      Depression Brother      Depression Sister      Depression Brother      Diabetes Brother      Physical exam:  /81   Pulse 76   Wt 86.9 kg (191 lb 8 oz)   LMP 06/07/2007   Breastfeeding? No   BMI 32.87 kg/m    Gen'l: appears well  Urethra: visible at introitus, appears normal  Vulva: agglutination of bilateral labia minora, adhesion of clitoral valencia and lichenification of perineum and perianal area. Introitus is more patent than prior exam. A small purple lesion on left labium majus at level of clitoris, small, prior purple lesions around introitus are no longer present.  Vagina: introitus is narrow, did not try to insert speculum,vaginal adhesions are present, but less than previous and not as much bleeding with exam. Cuff palpates smooth.  Cervix/Uterus: absent  Adnexa: no palpable masses      Assessment/Plan:  Lichen Sclerosus  Vaginal atrophy     - Continue twice weekly clobetasol ointment  - If desires vaginal intercourse would recommend dilators, patient declines for now.  - History of breast cancer, but may consider topical vaginal estrogen if symptoms of vaginal atrophy worsen.  - RTC 3 months    Cyndee Caballero MD

## 2019-03-26 PROBLEM — L90.0 LICHEN SCLEROSUS: Status: ACTIVE | Noted: 2019-03-26

## 2019-04-03 ENCOUNTER — COMMUNICATION - HEALTHEAST (OUTPATIENT)
Dept: INTERNAL MEDICINE | Facility: CLINIC | Age: 64
End: 2019-04-03

## 2019-04-05 ENCOUNTER — COMMUNICATION - HEALTHEAST (OUTPATIENT)
Dept: INTERNAL MEDICINE | Facility: CLINIC | Age: 64
End: 2019-04-05

## 2019-04-05 DIAGNOSIS — I10 ESSENTIAL HYPERTENSION: ICD-10-CM

## 2019-04-06 ENCOUNTER — COMMUNICATION - HEALTHEAST (OUTPATIENT)
Dept: INTERNAL MEDICINE | Facility: CLINIC | Age: 64
End: 2019-04-06

## 2019-04-06 DIAGNOSIS — I10 HYPERTENSION: ICD-10-CM

## 2019-04-06 DIAGNOSIS — E03.9 ACQUIRED HYPOTHYROIDISM: ICD-10-CM

## 2019-04-06 DIAGNOSIS — E03.9 HYPOTHYROIDISM, UNSPECIFIED TYPE: ICD-10-CM

## 2019-04-23 ENCOUNTER — COMMUNICATION - HEALTHEAST (OUTPATIENT)
Dept: INTERNAL MEDICINE | Facility: CLINIC | Age: 64
End: 2019-04-23

## 2019-04-24 ENCOUNTER — RECORDS - HEALTHEAST (OUTPATIENT)
Dept: ADMINISTRATIVE | Facility: OTHER | Age: 64
End: 2019-04-24

## 2019-04-30 ENCOUNTER — COMMUNICATION - HEALTHEAST (OUTPATIENT)
Dept: INTERNAL MEDICINE | Facility: CLINIC | Age: 64
End: 2019-04-30

## 2019-04-30 ENCOUNTER — ONCOLOGY VISIT (OUTPATIENT)
Dept: ONCOLOGY | Facility: CLINIC | Age: 64
End: 2019-04-30
Attending: INTERNAL MEDICINE
Payer: MEDICARE

## 2019-04-30 ENCOUNTER — RECORDS - HEALTHEAST (OUTPATIENT)
Dept: ADMINISTRATIVE | Facility: OTHER | Age: 64
End: 2019-04-30

## 2019-04-30 ENCOUNTER — APPOINTMENT (OUTPATIENT)
Dept: LAB | Facility: CLINIC | Age: 64
End: 2019-04-30
Attending: INTERNAL MEDICINE
Payer: MEDICARE

## 2019-04-30 VITALS
DIASTOLIC BLOOD PRESSURE: 82 MMHG | OXYGEN SATURATION: 97 % | WEIGHT: 191.3 LBS | TEMPERATURE: 97.8 F | SYSTOLIC BLOOD PRESSURE: 148 MMHG | BODY MASS INDEX: 32.66 KG/M2 | RESPIRATION RATE: 18 BRPM | HEIGHT: 64 IN | HEART RATE: 98 BPM

## 2019-04-30 DIAGNOSIS — Z17.0 MALIGNANT NEOPLASM OF UPPER-INNER QUADRANT OF LEFT BREAST IN FEMALE, ESTROGEN RECEPTOR POSITIVE (H): ICD-10-CM

## 2019-04-30 DIAGNOSIS — C50.212 MALIGNANT NEOPLASM OF UPPER-INNER QUADRANT OF LEFT BREAST IN FEMALE, ESTROGEN RECEPTOR POSITIVE (H): ICD-10-CM

## 2019-04-30 LAB
ALBUMIN SERPL-MCNC: 3.6 G/DL (ref 3.4–5)
ALP SERPL-CCNC: 80 U/L (ref 40–150)
ALT SERPL W P-5'-P-CCNC: 26 U/L (ref 0–50)
ANION GAP SERPL CALCULATED.3IONS-SCNC: 7 MMOL/L (ref 3–14)
AST SERPL W P-5'-P-CCNC: 21 U/L (ref 0–45)
BASOPHILS # BLD AUTO: 0.1 10E9/L (ref 0–0.2)
BASOPHILS NFR BLD AUTO: 0.7 %
BILIRUB SERPL-MCNC: 0.6 MG/DL (ref 0.2–1.3)
BUN SERPL-MCNC: 15 MG/DL (ref 7–30)
CALCIUM SERPL-MCNC: 9.4 MG/DL (ref 8.5–10.1)
CHLORIDE SERPL-SCNC: 107 MMOL/L (ref 94–109)
CO2 SERPL-SCNC: 27 MMOL/L (ref 20–32)
CREAT SERPL-MCNC: 0.93 MG/DL (ref 0.52–1.04)
DIFFERENTIAL METHOD BLD: NORMAL
EOSINOPHIL # BLD AUTO: 0.4 10E9/L (ref 0–0.7)
EOSINOPHIL NFR BLD AUTO: 4.9 %
ERYTHROCYTE [DISTWIDTH] IN BLOOD BY AUTOMATED COUNT: 12.1 % (ref 10–15)
GFR SERPL CREATININE-BSD FRML MDRD: 65 ML/MIN/{1.73_M2}
GLUCOSE SERPL-MCNC: 132 MG/DL (ref 70–99)
HCT VFR BLD AUTO: 43.3 % (ref 35–47)
HGB BLD-MCNC: 15.1 G/DL (ref 11.7–15.7)
IMM GRANULOCYTES # BLD: 0 10E9/L (ref 0–0.4)
IMM GRANULOCYTES NFR BLD: 0.4 %
LYMPHOCYTES # BLD AUTO: 1.8 10E9/L (ref 0.8–5.3)
LYMPHOCYTES NFR BLD AUTO: 23.6 %
MCH RBC QN AUTO: 31.6 PG (ref 26.5–33)
MCHC RBC AUTO-ENTMCNC: 34.9 G/DL (ref 31.5–36.5)
MCV RBC AUTO: 91 FL (ref 78–100)
MONOCYTES # BLD AUTO: 0.5 10E9/L (ref 0–1.3)
MONOCYTES NFR BLD AUTO: 6.4 %
NEUTROPHILS # BLD AUTO: 4.7 10E9/L (ref 1.6–8.3)
NEUTROPHILS NFR BLD AUTO: 64 %
NRBC # BLD AUTO: 0 10*3/UL
NRBC BLD AUTO-RTO: 0 /100
PLATELET # BLD AUTO: 217 10E9/L (ref 150–450)
POTASSIUM SERPL-SCNC: 3.4 MMOL/L (ref 3.4–5.3)
PROT SERPL-MCNC: 7 G/DL (ref 6.8–8.8)
RBC # BLD AUTO: 4.78 10E12/L (ref 3.8–5.2)
SODIUM SERPL-SCNC: 141 MMOL/L (ref 133–144)
WBC # BLD AUTO: 7.4 10E9/L (ref 4–11)

## 2019-04-30 PROCEDURE — 36415 COLL VENOUS BLD VENIPUNCTURE: CPT

## 2019-04-30 PROCEDURE — 99214 OFFICE O/P EST MOD 30 MIN: CPT | Mod: ZP | Performed by: INTERNAL MEDICINE

## 2019-04-30 PROCEDURE — 80053 COMPREHEN METABOLIC PANEL: CPT | Performed by: INTERNAL MEDICINE

## 2019-04-30 PROCEDURE — 85025 COMPLETE CBC W/AUTO DIFF WBC: CPT | Performed by: INTERNAL MEDICINE

## 2019-04-30 PROCEDURE — G0463 HOSPITAL OUTPT CLINIC VISIT: HCPCS | Mod: ZF

## 2019-04-30 RX ORDER — AMLODIPINE BESYLATE 5 MG/1
5 TABLET ORAL DAILY
Refills: 11 | COMMUNITY
Start: 2019-04-06 | End: 2022-07-04

## 2019-04-30 ASSESSMENT — MIFFLIN-ST. JEOR: SCORE: 1397.73

## 2019-04-30 ASSESSMENT — PAIN SCALES - GENERAL: PAINLEVEL: NO PAIN (0)

## 2019-04-30 NOTE — PROGRESS NOTES
BOOKER Waite is a 63-year-old woman with a history of stage IIA, T2 N0 M0, ER positive, ID positive and HER-2 negative invasive ductal carcinoma of the left breast diagnosed in 2007.  She is status post lumpectomy 08/05/2007 followed by 4 cycles of Taxotere and Cyclophosphamide. Patient completed radiation therapy in 01/2008. She initiated Tamoxifen and was on it for one year and when she became post-menopausal, she was changed to Arimidex in 08/2008There is a small seroma at the site of the lumpectomy just above the incision.  It is also possible there could be some fat necrosis there.  This area of firmness has been stable.  She has been followed by our clinic every 6 months and we will alternate followup with Michelle [facility]Cle.  She has been followed by yearly mammographic surveillance.        FOLLOWUP VISIT      HISTORY OF PRESENT ILLNESS:  Cinthya returns to clinic with complaints of pain in the left breast at the lumpectomy site.  She had pain on Saturday night lasting about 2 hours.  The pain then recurred the following day and then recurred on Monday.  She did have an episode of waking up with pain.  Overall, she has had 3 episodes of pain at the lumpectomy site, lasting about 2 hours.  She has no fatigue, no depression, no anxiety.  She has recently retired as of January.  The pain did radiate to her left underarm and she did ice the area and took ibuprofen.  The first pain episode was Saturday evening, next was Sunday evening and again she had some pain on Monday.  The pain is now resolved.      REVIEW OF SYSTEMS:  She denies fevers or chills, cough, chest pain, shortness of breath, nausea, vomiting, constipation, diarrhea, bone pain, back pain or headache.  The remainder of a 10-point review of systems is negative.      Her diet has not changed.  She has been exercising, doing Pilates and biking when the weather is decent.  She takes calcium and vitamin D.  Her DEXA scan most recently showed a T  score of -1.3, consistent with low bone density.      She did have coarse calcifications in her left breast, BI-RADS 4 on 10/04/2018.  She had a biopsy, which showed no evidence of malignancy.      PHYSICAL EXAMINATION:   VITAL SIGNS:  Blood pressure 148/82, temperature 97.8, pulse 98, respirations 18, O2 sat 97% on room air, height 1.6 meters and weight 86.8 kg.   GENERAL:  Cinthya has no alopecia.   HEENT:  Oropharynx is without lesions.   LYMPH:  There is no palpable cervical, supraclavicular, subclavicular or axillary lymphadenopathy.   BREASTS:  Examination of right and left breast both reveals inverted nipples.  No masses in the right breast.  The left breast reveals no masses.  The lumpectomy incision at the 12-o'clock position is well healed without erythema or masses.  There is some area of firmness consistent with fat necrosis essentially unchanged just above the well-healed incision.  No other masses in the left breast.  The left axillary incision is well healed without erythema or masses.   LUNGS:  Clear to percussion and auscultation.   HEART:  Regular rate and rhythm, S1, S2.   ABDOMEN:  Soft and nontender, consistent with increased BMI.   EXTREMITIES:  Without edema.   PSYCHIATRIC:  Mood and affect were normal.      LABORATORY DATA:  The CBC and CMP were within normal limits.  Glucose 132 nonfasting.  DEXA scan on 01/29/2019 showed a most valid negative T score of -1.3 at the right femoral neck consistent with low bone density.      ASSESSMENT AND PLAN:   1.  Cinthya Waite is a 63-year-old woman with a history of stage IIA, T2N0M0, ER-positive, NM-positive, HER2-negative invasive ductal carcinoma of the left breast diagnosed in 2007.  There is a small seroma or fat necrosis at the lumpectomy site just above the incision, which is essentially unchanged.  The plan is to continue with every 6-month followup alternating with [facility].  She did have new breast pain and we will obtain a left breast mammogram  and ultrasound within the next couple of days.  She did have a breast biopsy of the left breast performed in 10/2018 because of BI-RADS 4 finding because of some coarse calcifications.   2.  Hormonal therapy.  Cinthya is now off hormonal therapy after completing 7-1/2 years.   3.  Small ground-glass opacities in the right lower lobe.  A CT scan of the chest could be considered for followup.  Low-dose spiral should be fine.   4.  Continue with calcium and vitamin D.   5.  Continue with exercise of 150 minutes a week.   6.  Continue with a diet that is low in saturated fat.   7.  Followup.  We will see Cinthya in followup in our clinic in 2 weeks. Left breast mammogram and ultrasound ordered.  For this week.  Follow up with me in 2 weeks.     Thank you for allowing us to continue to participate in Cinthya Waite's care.      Johnathan Herrera MD      [facility]     ADDENDUM:  Follow up mammogram October, 2019.      Exam: Left breast diagnostic digital mammogram with computer aided  detection, tomosynthesis and left breast ultrasound, 5/2/2019     Comparison: 10/5/2018, 8/1/2017, 8/25/2016     Technique: Tomosynthesis.     History: Soreness at 11-year-old lumpectomy site. Personal history of  left breast cancer status post left breast conservation therapy.  History of breast biopsies. Patient reports negative testing for BRCA  gene mutation. Patient is of Ashkenazi Jew descent.   Mother with  breast cancer age 66.     BREAST DENSITY: Scattered fibroglandular densities.     Findings:   Mammogram: Conservation therapy changes in the left breast. Left  breast biopsy clip. No significant change.     Ultrasound: Targeted left breast ultrasound was performed by  radiologist and technologist. Sonographic evaluation in the area of  soreness, superior/mid left breast along surgical scar, demonstrates  expected post surgical changes. No suspicious sonographic finding.                                                                       IMPRESSION: BI-RADS CATEGORY: 2 - Benign Finding(s).     RECOMMENDED FOLLOW-UP: Annual Mammography (due 10/2019)     Clinical follow-up left breast soreness.  Given lack of suspicious  imaging findings in the left breast, management would need to be based  on clinical grounds.     Results discussed with the patient.     I have personally reviewed the examination and initial interpretation  and I agree with the findings.     TAVIA HAMILTON MD    I spent 30 minutes with the patient more than 50% of which was in counseling and coordination of care.

## 2019-04-30 NOTE — NURSING NOTE
"Oncology Rooming Note    April 30, 2019 4:15 PM   Cinthya Waite is a 63 year old female who presents for:    Chief Complaint   Patient presents with     Blood Draw     Labs drawn via  by RN in lab. VS taken.     Oncology Clinic Visit     RETURN VISIT; BREAST CA 6 MONTH FOLLOW UP      Initial Vitals: /82 (BP Location: Right arm, Patient Position: Sitting, Cuff Size: Adult Regular)   Pulse 98   Temp 97.8  F (36.6  C) (Oral)   Resp 18   Ht 1.626 m (5' 4\")   Wt 86.8 kg (191 lb 4.8 oz)   LMP 06/07/2007   SpO2 97%   BMI 32.84 kg/m   Estimated body mass index is 32.84 kg/m  as calculated from the following:    Height as of this encounter: 1.626 m (5' 4\").    Weight as of this encounter: 86.8 kg (191 lb 4.8 oz). Body surface area is 1.98 meters squared.  No Pain (0) Comment: Data Unavailable   Patient's last menstrual period was 06/07/2007.  Allergies reviewed: Yes  Medications reviewed: Yes    Medications: Medication refills not needed today.  Pharmacy name entered into [employer]:    [id number] [employer] - [address] [place], [place] - [address] [facility] - [place], [place] - [address]    Clinical concerns: Patient states that she woke up Saturday because of pain in her left breast. Patient states that she gets pain every night since then.  Dr. Herrera was notified.      Bridgette Stokes              "

## 2019-04-30 NOTE — NURSING NOTE
Chief Complaint   Patient presents with     Blood Draw     Labs drawn via  by RN in lab. VS taken.     Shantal Arellano RN

## 2019-04-30 NOTE — LETTER
4/30/2019       RE: Cinthya Waite  [address] [place] [address]     Dear Colleague,    Thank you for referring your patient, Cinthya Waite, to the [facility]. Please see a copy of my visit note below.    BOOKER Waite is a 63-year-old woman with a history of stage IIA, T2 N0 M0, ER positive, SD positive and HER-2 negative invasive ductal carcinoma of the left breast diagnosed in 2007.  She is status post lumpectomy 08/05/2007 followed by 4 cycles of Taxotere and Cyclophosphamide. Patient completed radiation therapy in 01/2008. She initiated Tamoxifen and was on it for one year and when she became post-menopausal, she was changed to Arimidex in 08/2008There is a small seroma at the site of the lumpectomy just above the incision.  It is also possible there could be some fat necrosis there.  This area of firmness has been stable.  She has been followed by our clinic every 6 months and we will alternate followup with Michelle Cole.  She has been followed by yearly mammographic surveillance.        FOLLOWUP VISIT      HISTORY OF PRESENT ILLNESS:  Cinthya returns to clinic with complaints of pain in the left breast at the lumpectomy site.  She had pain on Saturday night lasting about 2 hours.  The pain then recurred the following day and then recurred on Monday.  She did have an episode of waking up with pain.  Overall, she has had 3 episodes of pain at the lumpectomy site, lasting about 2 hours.  She has no fatigue, no depression, no anxiety.  She has recently retired as of January.  The pain did radiate to her left underarm and she did ice the area and took ibuprofen.  The first pain episode was Saturday evening, next was Sunday evening and again she had some pain on Monday.  The pain is now resolved.      REVIEW OF SYSTEMS:  She denies fevers or chills, cough, chest pain, shortness of breath, nausea, vomiting, constipation, diarrhea, bone pain, back  pain or headache.  The remainder of a 10-point review of systems is negative.      Her diet has not changed.  She has been exercising, doing Pilates and biking when the weather is decent.  She takes calcium and vitamin D.  Her DEXA scan most recently showed a T score of -1.3, consistent with low bone density.      She did have coarse calcifications in her left breast, BI-RADS 4 on 10/04/2018.  She had a biopsy, which showed no evidence of malignancy.      PHYSICAL EXAMINATION:   VITAL SIGNS:  Blood pressure 148/82, temperature 97.8, pulse 98, respirations 18, O2 sat 97% on room air, height 1.6 meters and weight 86.8 kg.   GENERAL:  Cinthya has no alopecia.   HEENT:  Oropharynx is without lesions.   LYMPH:  There is no palpable cervical, supraclavicular, subclavicular or axillary lymphadenopathy.   BREASTS:  Examination of right and left breast both reveals inverted nipples.  No masses in the right breast.  The left breast reveals no masses.  The lumpectomy incision at the 12-o'clock position is well healed without erythema or masses.  There is some area of firmness consistent with fat necrosis essentially unchanged just above the well-healed incision.  No other masses in the left breast.  The left axillary incision is well healed without erythema or masses.   LUNGS:  Clear to percussion and auscultation.   HEART:  Regular rate and rhythm, S1, S2.   ABDOMEN:  Soft and nontender, consistent with increased BMI.   EXTREMITIES:  Without edema.   PSYCHIATRIC:  Mood and affect were normal.      LABORATORY DATA:  The CBC and CMP were within normal limits.  Glucose 132 nonfasting.  DEXA scan on 01/29/2019 showed a most valid negative T score of -1.3 at the right femoral neck consistent with low bone density.      ASSESSMENT AND PLAN:   1.  Cinthya Waite is a 63-year-old woman with a history of stage IIA, T2N0M0, ER-positive, ID-positive, HER2-negative invasive ductal carcinoma of the left breast diagnosed in 2007.  There is  a small seroma or fat necrosis at the lumpectomy site just above the incision, which is essentially unchanged.  The plan is to continue with every 6-month followup alternating with HALEY.  She did have new breast pain and we will obtain a left breast mammogram and ultrasound within the next couple of days.  She did have a breast biopsy of the left breast performed in 10/2018 because of BI-RADS 4 finding because of some coarse calcifications.   2.  Hormonal therapy.  Cinthya is now off hormonal therapy after completing 7-1/2 years.   3.  Small ground-glass opacities in the right lower lobe.  A CT scan of the chest could be considered for followup.  Low-dose spiral should be fine.   4.  Continue with calcium and vitamin D.   5.  Continue with exercise of 150 minutes a week.   6.  Continue with a diet that is low in saturated fat.   7.  Followup.  We will see Cinthya in followup in our clinic in 2 weeks. Left breast mammogram and ultrasound ordered.  For this week.  Follow up with me in 2 weeks.     Thank you for allowing us to continue to participate in Cinthya Waite's care.      Johnathan Herrera MD      [facility]     ADDENDUM:  Follow up mammogram October, 2019.      Exam: Left breast diagnostic digital mammogram with computer aided  detection, tomosynthesis and left breast ultrasound, 5/2/2019     Comparison: 10/5/2018, 8/1/2017, 8/25/2016     Technique: Tomosynthesis.     History: Soreness at 11-year-old lumpectomy site. Personal history of  left breast cancer status post left breast conservation therapy.  History of breast biopsies. Patient reports negative testing for BRCA  gene mutation. Patient is of Ashkenazi Latter day descent.   Mother with  breast cancer age 66.     BREAST DENSITY: Scattered fibroglandular densities.     Findings:   Mammogram: Conservation therapy changes in the left breast. Left  breast biopsy clip. No significant change.     Ultrasound: Targeted  left breast ultrasound was performed by  radiologist and technologist. Sonographic evaluation in the area of  soreness, superior/mid left breast along surgical scar, demonstrates  expected post surgical changes. No suspicious sonographic finding.                                                                      IMPRESSION: BI-RADS CATEGORY: 2 - Benign Finding(s).     RECOMMENDED FOLLOW-UP: Annual Mammography (due 10/2019)     Clinical follow-up left breast soreness.  Given lack of suspicious  imaging findings in the left breast, management would need to be based  on clinical grounds.     Results discussed with the patient.     I have personally reviewed the examination and initial interpretation  and I agree with the findings.     TAVIA HAMILTON MD    I spent 30 minutes with the patient more than 50% of which was in counseling and coordination of care.     Again, thank you for allowing me to participate in the care of your patient.      Sincerely,    Johnathan Herrera MD

## 2019-05-02 ENCOUNTER — ANCILLARY PROCEDURE (OUTPATIENT)
Dept: MAMMOGRAPHY | Facility: CLINIC | Age: 64
End: 2019-05-02
Attending: INTERNAL MEDICINE
Payer: MEDICARE

## 2019-05-02 DIAGNOSIS — C50.212 MALIGNANT NEOPLASM OF UPPER-INNER QUADRANT OF LEFT BREAST IN FEMALE, ESTROGEN RECEPTOR POSITIVE (H): ICD-10-CM

## 2019-05-02 DIAGNOSIS — Z17.0 MALIGNANT NEOPLASM OF UPPER-INNER QUADRANT OF LEFT BREAST IN FEMALE, ESTROGEN RECEPTOR POSITIVE (H): ICD-10-CM

## 2019-05-03 ENCOUNTER — TELEPHONE (OUTPATIENT)
Dept: ONCOLOGY | Facility: CLINIC | Age: 64
End: 2019-05-03

## 2019-05-07 ENCOUNTER — RECORDS - HEALTHEAST (OUTPATIENT)
Dept: ADMINISTRATIVE | Facility: OTHER | Age: 64
End: 2019-05-07

## 2019-05-13 NOTE — PROGRESS NOTES
BOOKER Waite is a 63-year-old woman with a history of stage IIA, T2 N0 M0, ER positive, CA positive and HER-2 negative invasive ductal carcinoma of the left breast diagnosed in 2007.  She is status post lumpectomy 08/05/2007 followed by 4 cycles of Taxotere and Cyclophosphamide. Patient completed radiation therapy in 01/2008. She initiated Tamoxifen and was on it for one year and when she became post-menopausal, she was changed to Arimidex in 08/2008There is a small seroma at the site of the lumpectomy just above the incision.  It is also possible there could be some fat necrosis there.  This area of firmness has been stable.  She has been followed by our clinic every 6 months and we will alternate followup with Michelle Cole.  She has been followed by yearly mammographic surveillance.         FOLLOWUP VISIT      HISTORY OF PRESENT ILLNESS:  Cinthya returns to clinic with complaints of pain in the left breast at the lumpectomy site.  She had pain on Saturday night lasting about 2 hours.  The pain then recurred the following day and then recurred on Monday.  She did have an episode of waking up with pain.  Overall, she has had 3 episodes of pain at the lumpectomy site, lasting about 2 hours.  She has no fatigue, no depression, no anxiety.  She has recently retired as of January.  The pain did radiate to her left underarm and she did ice the area and took ibuprofen.  The first pain episode was Saturday evening, next was Sunday evening and again she had some pain on Monday.  The pain is now resolved.     HISTORY OF PRESENT ILLNESS:  Cinthya returns to clinic to go over the results of her mammogram which showed benign findings.  The breast discomfort that she has had has significantly improved.  She has been riding her bike and very physically active.  In fact, she rode her bike to the clinic visit today.      /89 (BP Location: Right arm, Patient Position: Sitting, Cuff Size: Adult Regular)   Pulse 95    "Temp 97  F (36.1  C) (Oral)   Resp 16   Ht 1.626 m (5' 4.02\")   Wt 85.2 kg (187 lb 14.4 oz)   LMP 06/07/2007   SpO2 94%   BMI 32.24 kg/m    Breast exam is not necessary because we just did a breast exam in the past couple of weeks.      IMAGING:  Right breast mammogram reviewed with Dr. Garcia and no evolving changes so we will go with a follow up bilateral mammogram in May 2020 which will be a 1 year interval on the left and 1.5 year on the right.     ASSESSMENT AND PLAN:   1.  Cinthya Waite is a 63-year-old woman with a history of stage IIA, T2N0M0, ER-positive, NM-positive and HER2-negative invasive ductal carcinoma of the left breast diagnosed in 2007.  There was a small seroma or fat necrosis at the lumpectomy site just above the incision which has been essentially unchanged.  Our plan now is to transition her followup care to the [facility] with an HALEY every 6 months.  Cinthya is in agreement with this plan.  She is more than 10 years out from her breast cancer diagnosis.  She is off of adjuvant hormonal therapy and is doing quite well.   2.  Followup in the [facility] with Alba Dewey on an every 6-month basis.   3.  Continue with calcium and vitamin D.   4.  Continue with exercise of 150 minutes a week.   5.  Continue with a diet that is low in saturated fat.   6.  Imaging follow up. Right breast mammogram reviewed with Dr. Garcia and no evolving changes so we will go with a follow up bilateral mammogram in May 2020 which will be a 1 year interval on the left and 1.5 year on the right.   7.  Bone health.  Prolia in February 2019 and every 6 months with her endocrinologist.   8.  Followup.  We will see Cinthya in followup in our clinic in 6 months.  A bilateral mammogram will be performed in 1 year. Transition to survivor's clinic. Follow up will be with Alba Dewey. Next appointment will be November 12 with CBC, CMP and then May 12 with CBC, CMP and tomomammogram.     Thank you for " allowing us to continue to participate in Cinthya Waite' care.      Johnathan Herrera MD      [facility]     I spent 20 minutes with the patient more than 50% of which was in counseling and coordination of care.

## 2019-05-14 ENCOUNTER — RECORDS - HEALTHEAST (OUTPATIENT)
Dept: ADMINISTRATIVE | Facility: OTHER | Age: 64
End: 2019-05-14

## 2019-05-14 ENCOUNTER — ONCOLOGY VISIT (OUTPATIENT)
Dept: ONCOLOGY | Facility: CLINIC | Age: 64
End: 2019-05-14
Attending: INTERNAL MEDICINE
Payer: MEDICARE

## 2019-05-14 VITALS
RESPIRATION RATE: 16 BRPM | HEIGHT: 64 IN | WEIGHT: 187.9 LBS | BODY MASS INDEX: 32.08 KG/M2 | OXYGEN SATURATION: 94 % | SYSTOLIC BLOOD PRESSURE: 132 MMHG | TEMPERATURE: 97 F | DIASTOLIC BLOOD PRESSURE: 89 MMHG | HEART RATE: 95 BPM

## 2019-05-14 DIAGNOSIS — C50.212 MALIGNANT NEOPLASM OF UPPER-INNER QUADRANT OF LEFT BREAST IN FEMALE, ESTROGEN RECEPTOR POSITIVE (H): Primary | ICD-10-CM

## 2019-05-14 DIAGNOSIS — Z17.0 MALIGNANT NEOPLASM OF UPPER-INNER QUADRANT OF LEFT BREAST IN FEMALE, ESTROGEN RECEPTOR POSITIVE (H): Primary | ICD-10-CM

## 2019-05-14 DIAGNOSIS — Z12.39 SCREENING BREAST EXAMINATION: ICD-10-CM

## 2019-05-14 PROCEDURE — G0463 HOSPITAL OUTPT CLINIC VISIT: HCPCS | Mod: ZF

## 2019-05-14 PROCEDURE — 99213 OFFICE O/P EST LOW 20 MIN: CPT | Mod: ZP | Performed by: INTERNAL MEDICINE

## 2019-05-14 ASSESSMENT — PAIN SCALES - GENERAL: PAINLEVEL: NO PAIN (0)

## 2019-05-14 ASSESSMENT — MIFFLIN-ST. JEOR: SCORE: 1382.56

## 2019-05-14 NOTE — LETTER
5/14/2019       RE: Cinthya Waite  [address]  [place] [address]     Dear Colleague,    Thank you for referring your patient, Cinthya Waite, to the [facility]. Please see a copy of my visit note below.    BOOKER Waite is a 63-year-old woman with a history of stage IIA, T2 N0 M0, ER positive, FL positive and HER-2 negative invasive ductal carcinoma of the left breast diagnosed in 2007.  She is status post lumpectomy 08/05/2007 followed by 4 cycles of Taxotere and Cyclophosphamide. Patient completed radiation therapy in 01/2008. She initiated Tamoxifen and was on it for one year and when she became post-menopausal, she was changed to Arimidex in 08/2008There is a small seroma at the site of the lumpectomy just above the incision.  It is also possible there could be some fat necrosis there.  This area of firmness has been stable.  She has been followed by our clinic every 6 months and we will alternate followup with Michelle Cole.  She has been followed by yearly mammographic surveillance.         FOLLOWUP VISIT      HISTORY OF PRESENT ILLNESS:  Cinthya returns to clinic with complaints of pain in the left breast at the lumpectomy site.  She had pain on Saturday night lasting about 2 hours.  The pain then recurred the following day and then recurred on Monday.  She did have an episode of waking up with pain.  Overall, she has had 3 episodes of pain at the lumpectomy site, lasting about 2 hours.  She has no fatigue, no depression, no anxiety.  She has recently retired as of January.  The pain did radiate to her left underarm and she did ice the area and took ibuprofen.  The first pain episode was Saturday evening, next was Sunday evening and again she had some pain on Monday.  The pain is now resolved.     HISTORY OF PRESENT ILLNESS:  Cinthya returns to clinic to go over the results of her mammogram which showed benign findings.  The breast discomfort  "that she has had has significantly improved.  She has been riding her bike and very physically active.  In fact, she rode her bike to the clinic visit today.      /89 (BP Location: Right arm, Patient Position: Sitting, Cuff Size: Adult Regular)   Pulse 95   Temp 97  F (36.1  C) (Oral)   Resp 16   Ht 1.626 m (5' 4.02\")   Wt 85.2 kg (187 lb 14.4 oz)   LMP 06/07/2007   SpO2 94%   BMI 32.24 kg/m     Breast exam is not necessary because we just did a breast exam in the past couple of weeks.      IMAGING:  Right breast mammogram reviewed with Dr. Garcia and no evolving changes so we will go with a follow up bilateral mammogram in May 2020 which will be a 1 year interval on the left and 1.5 year on the right.     ASSESSMENT AND PLAN:   1.  Cinthya Waite is a 63-year-old woman with a history of stage IIA, T2N0M0, ER-positive, ND-positive and HER2-negative invasive ductal carcinoma of the left breast diagnosed in 2007.  There was a small seroma or fat necrosis at the lumpectomy site just above the incision which has been essentially unchanged.  Our plan now is to transition her followup care to the [facility] with an HALEY every 6 months.  Cinthya is in agreement with this plan.  She is more than 10 years out from her breast cancer diagnosis.  She is off of adjuvant hormonal therapy and is doing quite well.   2.  Followup in the [facility] with Alba Dewey on an every 6-month basis.   3.  Continue with calcium and vitamin D.   4.  Continue with exercise of 150 minutes a week.   5.  Continue with a diet that is low in saturated fat.   6.  Imaging follow up. Right breast mammogram reviewed with Dr. Garcia and no evolving changes so we will go with a follow up bilateral mammogram in May 2020 which will be a 1 year interval on the left and 1.5 year on the right.   7.  Bone health.  Prolia in February 2019 and every 6 months with her endocrinologist.   8.  Followup.  We will see Cinthya in followup in " our clinic in 6 months.  A bilateral mammogram will be performed in 1 year. Transition to survivor's clinic. Follow up will be with Alba Dewey. Next appointment will be November 12 with CBC, CMP and then May 12 with CBC, CMP and tomomammogram.     Thank you for allowing us to continue to participate in Cinthya Waite' care.      Johnathan Herrera MD      [facility]     I spent 20 minutes with the patient more than 50% of which was in counseling and coordination of care.     Again, thank you for allowing me to participate in the care of your patient.      Sincerely,    Johnathan Herrera MD

## 2019-05-14 NOTE — NURSING NOTE
"Oncology Rooming Note    May 14, 2019 4:20 PM   Cinthya Waite is a 63 year old female who presents for:    Chief Complaint   Patient presents with     RECHECK     Breast ca      Initial Vitals: LMP 06/07/2007  Estimated body mass index is 32.84 kg/m  as calculated from the following:    Height as of 4/30/19: 1.626 m (5' 4\").    Weight as of 4/30/19: 86.8 kg (191 lb 4.8 oz). There is no height or weight on file to calculate BSA.  Data Unavailable Comment: Data Unavailable   Patient's last menstrual period was 06/07/2007.  Allergies reviewed: Yes  Medications reviewed: Yes    Medications: Medication refills not needed today.  Pharmacy name entered into [email or web address]:    [id number] [address] [place], [place] - [address] [facility] - [place], [place] - [address]    Clinical concerns: none        Padmini Feliciano, CMA              "

## 2019-05-23 ENCOUNTER — COMMUNICATION - HEALTHEAST (OUTPATIENT)
Dept: INTERNAL MEDICINE | Facility: CLINIC | Age: 64
End: 2019-05-23

## 2019-06-20 ENCOUNTER — AMBULATORY - HEALTHEAST (OUTPATIENT)
Dept: INTERNAL MEDICINE | Facility: CLINIC | Age: 64
End: 2019-06-20

## 2019-06-20 DIAGNOSIS — M81.0 OSTEOPOROSIS: ICD-10-CM

## 2019-06-21 ENCOUNTER — COMMUNICATION - HEALTHEAST (OUTPATIENT)
Dept: INTERNAL MEDICINE | Facility: CLINIC | Age: 64
End: 2019-06-21

## 2019-06-24 ENCOUNTER — AMBULATORY - HEALTHEAST (OUTPATIENT)
Dept: NURSING | Facility: CLINIC | Age: 64
End: 2019-06-24

## 2019-06-25 ENCOUNTER — RECORDS - HEALTHEAST (OUTPATIENT)
Dept: ADMINISTRATIVE | Facility: OTHER | Age: 64
End: 2019-06-25

## 2019-06-25 ENCOUNTER — OFFICE VISIT (OUTPATIENT)
Dept: OBGYN | Facility: CLINIC | Age: 64
End: 2019-06-25
Attending: OBSTETRICS & GYNECOLOGY
Payer: MEDICARE

## 2019-06-25 VITALS
BODY MASS INDEX: 31.4 KG/M2 | HEART RATE: 85 BPM | SYSTOLIC BLOOD PRESSURE: 139 MMHG | WEIGHT: 183 LBS | DIASTOLIC BLOOD PRESSURE: 81 MMHG

## 2019-06-25 DIAGNOSIS — L90.0 LICHEN SCLEROSUS: Primary | ICD-10-CM

## 2019-06-25 PROCEDURE — G0463 HOSPITAL OUTPT CLINIC VISIT: HCPCS | Mod: ZF

## 2019-06-25 ASSESSMENT — PAIN SCALES - GENERAL: PAINLEVEL: NO PAIN (0)

## 2019-06-25 NOTE — LETTER
6/25/2019       RE: Cinthya Waite  [address]  [place] [address]     Dear Colleague,    Thank you for referring your patient, Cinthya Waite, to the [facility] at [facility]. Please see a copy of my visit note below.    S: Ms Waite today has no specific concerns or complaints in regards to her lichen sclerosus. She says her symptoms have much approved and she has moved to only using the clobetasol as needed instead of 2-3x per week. She is voiding w/o any difficulty. She is not currently sexually active. She has no pelvic pain or bleeding since her hysterectomy 8/2018. No other genitourinary concerns today.     ROS: 10 point ROS neg other than the symptoms noted above in the HPI.    O: /81   Pulse 85   Wt 83 kg (183 lb)   LMP 06/07/2007   Breastfeeding? No   BMI 31.40 kg/m       Physical exam:  General: NAD, appears well  HEENT: normocephalic  Resp: non-labored breathing  Vulva: agglutination of bilateral labia minora. Adhesion of clitoral valencia. Introitus is patent with single digit, no bleeding. persistent but unchanged red lesion on left labium majus and small purple lesions at 6' o clock on the perineaum.   Vagina: vaginal adhesions are present, cuff palpates smooth, no bleeding with vaginal exam (did not use speculum)    Assessment and plan:  62 y/o with lichen sclerosus  Vaginal atophy    - continue clobetasol as needed  - no desire for sexual intercourse so no need for dilators at this time  - return for f/u of lichen sclerosus every 6 months    Marylin Mejía MD  PGY-1 OB/GYN    Appreciate Dr. Mejía's note above, patient seen and examined by me along with resident physician. I agree with the note as edited above.   Cyndee Caballero MD

## 2019-06-25 NOTE — PROGRESS NOTES
S: Ms Waite today has no specific concerns or complaints in regards to her lichen sclerosus. She says her symptoms have much approved and she has moved to only using the clobetasol as needed instead of 2-3x per week. She is voiding w/o any difficulty. She is not currently sexually active. She has no pelvic pain or bleeding since her hysterectomy 8/2018. No other genitourinary concerns today.     ROS: 10 point ROS neg other than the symptoms noted above in the HPI.    O: /81   Pulse 85   Wt 83 kg (183 lb)   LMP 06/07/2007   Breastfeeding? No   BMI 31.40 kg/m      Physical exam:  General: NAD, appears well  HEENT: normocephalic  Resp: non-labored breathing  Vulva: agglutination of bilateral labia minora. Adhesion of clitoral valencia. Introitus is patent with single digit, no bleeding. persistent but unchanged red lesion on left labium majus and small purple lesions at 6' o clock on the perineaum.   Vagina: vaginal adhesions are present, cuff palpates smooth, no bleeding with vaginal exam (did not use speculum)    Assessment and plan:  64 y/o with lichen sclerosus  Vaginal atophy    - continue clobetasol as needed  - no desire for sexual intercourse so no need for dilators at this time  - return for f/u of lichen sclerosus every 6 months    Marylin Mejía MD  PGY-1 OB/GYN    Appreciate Dr. Mejía's note above, patient seen and examined by me along with resident physician. I agree with the note as edited above.   Cyndee Caballero MD

## 2019-07-13 ENCOUNTER — COMMUNICATION - HEALTHEAST (OUTPATIENT)
Dept: INTERNAL MEDICINE | Facility: CLINIC | Age: 64
End: 2019-07-13

## 2019-07-13 DIAGNOSIS — E78.00 HYPERCHOLESTEROLEMIA: ICD-10-CM

## 2019-07-18 ENCOUNTER — RECORDS - HEALTHEAST (OUTPATIENT)
Dept: ADMINISTRATIVE | Facility: OTHER | Age: 64
End: 2019-07-18

## 2019-07-29 ENCOUNTER — COMMUNICATION - HEALTHEAST (OUTPATIENT)
Dept: INTERNAL MEDICINE | Facility: CLINIC | Age: 64
End: 2019-07-29

## 2019-07-29 DIAGNOSIS — R35.0 URINARY FREQUENCY: ICD-10-CM

## 2019-07-30 ENCOUNTER — COMMUNICATION - HEALTHEAST (OUTPATIENT)
Dept: INTERNAL MEDICINE | Facility: CLINIC | Age: 64
End: 2019-07-30

## 2019-07-30 ENCOUNTER — AMBULATORY - HEALTHEAST (OUTPATIENT)
Dept: LAB | Facility: CLINIC | Age: 64
End: 2019-07-30

## 2019-07-30 ENCOUNTER — HOSPITAL ENCOUNTER (OUTPATIENT)
Dept: LAB | Age: 64
Setting detail: SPECIMEN
Discharge: HOME | End: 2019-07-30

## 2019-07-30 DIAGNOSIS — R35.0 URINARY FREQUENCY: ICD-10-CM

## 2019-07-30 DIAGNOSIS — N30.00 ACUTE CYSTITIS WITHOUT HEMATURIA: ICD-10-CM

## 2019-07-30 DIAGNOSIS — E78.00 HYPERCHOLESTEROLEMIA: ICD-10-CM

## 2019-07-30 LAB
ALBUMIN UR-MCNC: NEGATIVE MG/DL
ALT SERPL W P-5'-P-CCNC: 19 U/L (ref 0–45)
APPEARANCE UR: CLEAR
AST SERPL W P-5'-P-CCNC: 17 U/L (ref 0–40)
BACTERIA #/AREA URNS HPF: ABNORMAL HPF
BILIRUB UR QL STRIP: NEGATIVE
CHOLEST SERPL-MCNC: 199 MG/DL
COLOR UR AUTO: YELLOW
FASTING STATUS PATIENT QL REPORTED: YES
GLUCOSE UR STRIP-MCNC: NEGATIVE MG/DL
HDLC SERPL-MCNC: 51 MG/DL
HGB UR QL STRIP: ABNORMAL
KETONES UR STRIP-MCNC: NEGATIVE MG/DL
LDLC SERPL CALC-MCNC: 119 MG/DL
LEUKOCYTE ESTERASE UR QL STRIP: ABNORMAL
NITRATE UR QL: NEGATIVE
PH UR STRIP: 7 [PH] (ref 5–8)
RBC #/AREA URNS AUTO: ABNORMAL HPF
SP GR UR STRIP: 1.01 (ref 1–1.03)
SQUAMOUS #/AREA URNS AUTO: ABNORMAL LPF
TRIGL SERPL-MCNC: 147 MG/DL
UROBILINOGEN UR STRIP-ACNC: ABNORMAL
WBC #/AREA URNS AUTO: ABNORMAL HPF

## 2019-08-02 LAB — BACTERIA SPEC CULT: ABNORMAL

## 2019-08-04 ENCOUNTER — COMMUNICATION - HEALTHEAST (OUTPATIENT)
Dept: INTERNAL MEDICINE | Facility: CLINIC | Age: 64
End: 2019-08-04

## 2019-08-14 ENCOUNTER — COMMUNICATION - HEALTHEAST (OUTPATIENT)
Dept: INTERNAL MEDICINE | Facility: CLINIC | Age: 64
End: 2019-08-14

## 2019-08-14 DIAGNOSIS — F34.1 DYSTHYMIC DISORDER: ICD-10-CM

## 2019-08-30 ENCOUNTER — COMMUNICATION - HEALTHEAST (OUTPATIENT)
Dept: INTERNAL MEDICINE | Facility: CLINIC | Age: 64
End: 2019-08-30

## 2019-09-24 ENCOUNTER — HOSPITAL ENCOUNTER (OUTPATIENT)
Dept: LAB | Age: 64
Setting detail: SPECIMEN
Discharge: HOME | End: 2019-09-24

## 2019-09-24 ENCOUNTER — OFFICE VISIT - HEALTHEAST (OUTPATIENT)
Dept: INTERNAL MEDICINE | Facility: CLINIC | Age: 64
End: 2019-09-24

## 2019-09-24 DIAGNOSIS — E03.9 ACQUIRED HYPOTHYROIDISM: ICD-10-CM

## 2019-09-24 DIAGNOSIS — I10 ESSENTIAL HYPERTENSION: ICD-10-CM

## 2019-09-24 DIAGNOSIS — E78.00 HYPERCHOLESTEROLEMIA: ICD-10-CM

## 2019-09-24 DIAGNOSIS — Z23 FLU VACCINE NEED: ICD-10-CM

## 2019-09-24 LAB
ALBUMIN SERPL-MCNC: 4 G/DL (ref 3.5–5)
ALP SERPL-CCNC: 68 U/L (ref 45–120)
ALT SERPL W P-5'-P-CCNC: 24 U/L (ref 0–45)
ANION GAP SERPL CALCULATED.3IONS-SCNC: 9 MMOL/L (ref 5–18)
AST SERPL W P-5'-P-CCNC: 19 U/L (ref 0–40)
BILIRUB SERPL-MCNC: 0.9 MG/DL (ref 0–1)
BUN SERPL-MCNC: 14 MG/DL (ref 8–22)
CALCIUM SERPL-MCNC: 9.3 MG/DL (ref 8.5–10.5)
CHLORIDE BLD-SCNC: 107 MMOL/L (ref 98–107)
CHOLEST SERPL-MCNC: 205 MG/DL
CO2 SERPL-SCNC: 27 MMOL/L (ref 22–31)
CREAT SERPL-MCNC: 0.83 MG/DL (ref 0.6–1.1)
FASTING STATUS PATIENT QL REPORTED: YES
GFR SERPL CREATININE-BSD FRML MDRD: >60 ML/MIN/1.73M2
GLUCOSE BLD-MCNC: 90 MG/DL (ref 70–125)
HDLC SERPL-MCNC: 60 MG/DL
LDLC SERPL CALC-MCNC: 121 MG/DL
POTASSIUM BLD-SCNC: 4.7 MMOL/L (ref 3.5–5)
PROT SERPL-MCNC: 6.6 G/DL (ref 6–8)
SODIUM SERPL-SCNC: 143 MMOL/L (ref 136–145)
TRIGL SERPL-MCNC: 119 MG/DL
TSH SERPL DL<=0.005 MIU/L-ACNC: 0.12 UIU/ML (ref 0.3–5)

## 2019-09-24 ASSESSMENT — MIFFLIN-ST. JEOR: SCORE: 1316.29

## 2019-09-25 ENCOUNTER — COMMUNICATION - HEALTHEAST (OUTPATIENT)
Dept: INTERNAL MEDICINE | Facility: CLINIC | Age: 64
End: 2019-09-25

## 2019-09-25 DIAGNOSIS — E03.9 HYPOTHYROIDISM, UNSPECIFIED TYPE: ICD-10-CM

## 2019-10-16 ENCOUNTER — OFFICE VISIT - HEALTHEAST (OUTPATIENT)
Dept: INTERNAL MEDICINE | Facility: CLINIC | Age: 64
End: 2019-10-16

## 2019-10-16 DIAGNOSIS — I10 ESSENTIAL HYPERTENSION: ICD-10-CM

## 2019-10-16 DIAGNOSIS — K21.00 GASTROESOPHAGEAL REFLUX DISEASE WITH ESOPHAGITIS: ICD-10-CM

## 2019-10-16 DIAGNOSIS — R42 EPISODIC LIGHTHEADEDNESS: ICD-10-CM

## 2019-10-16 ASSESSMENT — PATIENT HEALTH QUESTIONNAIRE - PHQ9: SUM OF ALL RESPONSES TO PHQ QUESTIONS 1-9: 6

## 2019-11-01 ENCOUNTER — AMBULATORY - HEALTHEAST (OUTPATIENT)
Dept: INTERNAL MEDICINE | Facility: CLINIC | Age: 64
End: 2019-11-01

## 2019-11-01 ENCOUNTER — OFFICE VISIT - HEALTHEAST (OUTPATIENT)
Dept: INTERNAL MEDICINE | Facility: CLINIC | Age: 64
End: 2019-11-01

## 2019-11-01 DIAGNOSIS — I10 ESSENTIAL HYPERTENSION: ICD-10-CM

## 2019-11-01 DIAGNOSIS — E03.9 HYPOTHYROIDISM, UNSPECIFIED TYPE: ICD-10-CM

## 2019-11-01 DIAGNOSIS — E03.9 ACQUIRED HYPOTHYROIDISM: ICD-10-CM

## 2019-11-01 LAB
ANION GAP SERPL CALCULATED.3IONS-SCNC: 7 MMOL/L (ref 5–18)
BUN SERPL-MCNC: 17 MG/DL (ref 8–22)
CALCIUM SERPL-MCNC: 9.1 MG/DL (ref 8.5–10.5)
CHLORIDE BLD-SCNC: 109 MMOL/L (ref 98–107)
CO2 SERPL-SCNC: 28 MMOL/L (ref 22–31)
CREAT SERPL-MCNC: 0.97 MG/DL (ref 0.6–1.1)
GFR SERPL CREATININE-BSD FRML MDRD: 58 ML/MIN/1.73M2
GLUCOSE BLD-MCNC: 85 MG/DL (ref 70–125)
POTASSIUM BLD-SCNC: 3.9 MMOL/L (ref 3.5–5)
SODIUM SERPL-SCNC: 144 MMOL/L (ref 136–145)
T4 FREE SERPL-MCNC: 1.2 NG/DL (ref 0.7–1.8)
TSH SERPL DL<=0.005 MIU/L-ACNC: 0.09 UIU/ML (ref 0.3–5)

## 2019-11-04 ENCOUNTER — COMMUNICATION - HEALTHEAST (OUTPATIENT)
Dept: INTERNAL MEDICINE | Facility: CLINIC | Age: 64
End: 2019-11-04

## 2019-11-04 ENCOUNTER — HEALTH MAINTENANCE LETTER (OUTPATIENT)
Age: 64
End: 2019-11-04

## 2019-11-12 ENCOUNTER — COMMUNICATION - HEALTHEAST (OUTPATIENT)
Dept: INTERNAL MEDICINE | Facility: CLINIC | Age: 64
End: 2019-11-12

## 2019-11-12 DIAGNOSIS — F34.1 DYSTHYMIC DISORDER: ICD-10-CM

## 2019-12-06 ENCOUNTER — COMMUNICATION - HEALTHEAST (OUTPATIENT)
Dept: INTERNAL MEDICINE | Facility: CLINIC | Age: 64
End: 2019-12-06

## 2019-12-06 DIAGNOSIS — M81.0 OSTEOPOROSIS: ICD-10-CM

## 2019-12-06 DIAGNOSIS — Z92.29 PERSONAL HISTORY OF OTHER DRUG THERAPY: ICD-10-CM

## 2019-12-18 ENCOUNTER — RECORDS - HEALTHEAST (OUTPATIENT)
Dept: ADMINISTRATIVE | Facility: OTHER | Age: 64
End: 2019-12-18

## 2019-12-20 ENCOUNTER — OFFICE VISIT - HEALTHEAST (OUTPATIENT)
Dept: INTERNAL MEDICINE | Facility: CLINIC | Age: 64
End: 2019-12-20

## 2019-12-20 DIAGNOSIS — E03.9 ACQUIRED HYPOTHYROIDISM: ICD-10-CM

## 2019-12-20 DIAGNOSIS — Z85.3 HISTORY OF BREAST CANCER: ICD-10-CM

## 2019-12-20 DIAGNOSIS — I10 ESSENTIAL HYPERTENSION: ICD-10-CM

## 2019-12-20 LAB — TSH SERPL DL<=0.005 MIU/L-ACNC: 1.05 UIU/ML (ref 0.3–5)

## 2019-12-20 ASSESSMENT — MIFFLIN-ST. JEOR: SCORE: 1319.75

## 2019-12-21 ENCOUNTER — COMMUNICATION - HEALTHEAST (OUTPATIENT)
Dept: INTERNAL MEDICINE | Facility: CLINIC | Age: 64
End: 2019-12-21

## 2019-12-26 ENCOUNTER — RECORDS - HEALTHEAST (OUTPATIENT)
Dept: ADMINISTRATIVE | Facility: OTHER | Age: 64
End: 2019-12-26

## 2019-12-26 ENCOUNTER — COMMUNICATION - HEALTHEAST (OUTPATIENT)
Dept: INTERNAL MEDICINE | Facility: CLINIC | Age: 64
End: 2019-12-26

## 2019-12-26 ENCOUNTER — OFFICE VISIT (OUTPATIENT)
Dept: OBGYN | Facility: CLINIC | Age: 64
End: 2019-12-26
Attending: OBSTETRICS & GYNECOLOGY
Payer: MEDICARE

## 2019-12-26 VITALS
HEIGHT: 64 IN | BODY MASS INDEX: 29.91 KG/M2 | DIASTOLIC BLOOD PRESSURE: 73 MMHG | SYSTOLIC BLOOD PRESSURE: 117 MMHG | HEART RATE: 76 BPM | WEIGHT: 175.2 LBS

## 2019-12-26 DIAGNOSIS — N89.5 VAGINAL ADHESIONS: ICD-10-CM

## 2019-12-26 DIAGNOSIS — N95.2 VAGINAL ATROPHY: ICD-10-CM

## 2019-12-26 DIAGNOSIS — L90.0 LICHEN SCLEROSUS: Primary | ICD-10-CM

## 2019-12-26 PROCEDURE — G0463 HOSPITAL OUTPT CLINIC VISIT: HCPCS | Mod: ZF

## 2019-12-26 ASSESSMENT — MIFFLIN-ST. JEOR: SCORE: 1319.7

## 2019-12-26 ASSESSMENT — PAIN SCALES - GENERAL: PAINLEVEL: NO PAIN (0)

## 2019-12-26 NOTE — NURSING NOTE
Chief Complaint   Patient presents with     Follow Up     Pt here for lichen sclerosis follow up.

## 2019-12-26 NOTE — LETTER
2019       RE: Cinthya Waite  [address]  [place] [address]     Dear Colleague,    Thank you for referring your patient, Cinthya Waite, to the [facility] at [facility]. Please see a copy of my visit note below.    Cinthya returns today for vulvar exam. She has a history of lichen sclerosus and vaginal atrophy/adhesions.  She is not sexually active and does not want to be.  The vulvar itching is much improved and she is only using the clobetasol when she has symptoms which has been only 2-3 times in the last 6 months.  She denies difficulty voiding and states her stream is normal.     ROS: 10 point ROS neg other than the symptoms noted above in the HPI.    Past Medical History:   Diagnosis Date     Abnormal Pap smear      Breast cancer (H) 2007    T1N0 left breast cancer     Depressive disorder, not elsewhere classified     Buspar, wellbutrin zoloft  sees Dr. Lim      Lichen sclerosus et atrophicus      Pure hypercholesterolemia     Lipitor     Raynaud's disease      Unspecified essential hypertension      Unspecified hypothyroidism     synthroid     Past Surgical History:   Procedure Laterality Date     ABDOMEN SURGERY      c- section x 2     APPENDECTOMY       BIOPSY       BREAST LUMPECTOMY, RT/LT  2007    Breast Lumpectomy /LT     C NONSPECIFIC PROCEDURE  10/82,     x2     C NONSPECIFIC PROCEDURE      Appendicitis     C NONSPECIFIC PROCEDURE      Hysteroscopy, D & C     COLONOSCOPY       CYSTOSCOPY N/A 8/3/2018    Procedure: CYSTOSCOPY;;  Surgeon: Cyndee Caballero MD;  Location: [facility] OR     DAVINCI HYSTERECTOMY TOTAL, BILATERAL SALPINGO-OOPHORECTOMY, COMBINED Bilateral 8/3/2018    Procedure: COMBINED DAVINCI HYSTERECTOMY TOTAL, SALPINGO-OOPHORECTOMY;  Davinci Total Laparoscopic Hysterectomy, Bilateral Salpingo Oophorectomy, and Cystoscopy ;  Surgeon: Cyndee Caballero MD;   "Location: [facility] OR     DILATION AND CURETTAGE, OPERATIVE HYSTEROSCOPY WITH MORCELLATOR, COMBINED N/A 10/28/2015    Procedure: COMBINED DILATION AND CURETTAGE, OPERATIVE HYSTEROSCOPY WITH MORCELLATOR;  Surgeon: Cyndee Wyman MD;  Location: [facility] OR     Physical exam:  /73   Pulse 76   Ht 1.626 m (5' 4\")   Wt 79.5 kg (175 lb 3.2 oz)   LMP 06/07/2007   BMI 30.07 kg/m      Gen'l: appears well  Abd: soft, NT, ND  Vulva: agglutination of bilateral labia minora, adhesion of clitoral valencia.  Introitus is patent to index finger, did not use speculum.  Red lesion of left labium majus is faint, the perineum appears normal, the small purple lesions are barely visible  Urethra: visible and appears normal  Vagina: adhesion present.  Able to pass digit through adhesions and palpate cuff, no blood on examining glove after this procedure.    Assessment/Plan:  Lichen sclerosus- stable  Vaginal atrophy with adhesions- stable and asymptomatic    - encouraged patient to use clobetasol in a thin layer around the introitus weekly  - discussed vaginal estrogen but patient has history of breast CA and wants to avoid all estrogen exposure, would discuss with oncologist if vaginal atrophy became symptomatic, but no need to address now.  - RTC 1 year.    Cyndee Caballero MD         "

## 2019-12-26 NOTE — PROGRESS NOTES
"Cinthya returns today for vulvar exam. She has a history of lichen sclerosus and vaginal atrophy/adhesions.  She is not sexually active and does not want to be.  The vulvar itching is much improved and she is only using the clobetasol when she has symptoms which has been only 2-3 times in the last 6 months.  She denies difficulty voiding and states her stream is normal.     ROS: 10 point ROS neg other than the symptoms noted above in the HPI.    Past Medical History:   Diagnosis Date     Abnormal Pap smear      Breast cancer (H) 2007    T1N0 left breast cancer     Depressive disorder, not elsewhere classified     Buspar, wellbutrin zoloft  sees Dr. Lim      Lichen sclerosus et atrophicus      Pure hypercholesterolemia     Lipitor     Raynaud's disease      Unspecified essential hypertension      Unspecified hypothyroidism     synthroid     Past Surgical History:   Procedure Laterality Date     ABDOMEN SURGERY      c- section x 2     APPENDECTOMY       BIOPSY       BREAST LUMPECTOMY, RT/LT  2007    Breast Lumpectomy /LT     C NONSPECIFIC PROCEDURE  10/82,     x2     C NONSPECIFIC PROCEDURE      Appendicitis     C NONSPECIFIC PROCEDURE      Hysteroscopy, D & C     COLONOSCOPY       CYSTOSCOPY N/A 8/3/2018    Procedure: CYSTOSCOPY;;  Surgeon: Cyndee Caballero MD;  Location: [facility]     DAVINCI HYSTERECTOMY TOTAL, BILATERAL SALPINGO-OOPHORECTOMY, COMBINED Bilateral 8/3/2018    Procedure: COMBINED DAVINCI HYSTERECTOMY TOTAL, SALPINGO-OOPHORECTOMY;  Davinci Total Laparoscopic Hysterectomy, Bilateral Salpingo Oophorectomy, and Cystoscopy ;  Surgeon: Cyndee Caballero MD;  Location: [facility]     DILATION AND CURETTAGE, OPERATIVE HYSTEROSCOPY WITH MORCELLATOR, COMBINED N/A 10/28/2015    Procedure: COMBINED DILATION AND CURETTAGE, OPERATIVE HYSTEROSCOPY WITH MORCELLATOR;  Surgeon: Cyndee Wyman MD;  Location: [facility] OR     Physical exam:  /73   Pulse 76   Ht 1.626 m (5' 4\")   Wt " 79.5 kg (175 lb 3.2 oz)   LMP 06/07/2007   BMI 30.07 kg/m     Gen'l: appears well  Abd: soft, NT, ND  Vulva: agglutination of bilateral labia minora, adhesion of clitoral valencia.  Introitus is patent to index finger, did not use speculum.  Red lesion of left labium majus is faint, the perineum appears normal, the small purple lesions are barely visible  Urethra: visible and appears normal  Vagina: adhesion present.  Able to pass digit through adhesions and palpate cuff, no blood on examining glove after this procedure.    Assessment/Plan:  Lichen sclerosus- stable  Vaginal atrophy with adhesions- stable and asymptomatic    - encouraged patient to use clobetasol in a thin layer around the introitus weekly  - discussed vaginal estrogen but patient has history of breast CA and wants to avoid all estrogen exposure, would discuss with oncologist if vaginal atrophy became symptomatic, but no need to address now.  - RTC 1 year.    Cyndee Caballero MD

## 2019-12-27 ENCOUNTER — AMBULATORY - HEALTHEAST (OUTPATIENT)
Dept: NURSING | Facility: CLINIC | Age: 64
End: 2019-12-27

## 2020-01-03 ENCOUNTER — RECORDS - HEALTHEAST (OUTPATIENT)
Dept: ADMINISTRATIVE | Facility: OTHER | Age: 65
End: 2020-01-03

## 2020-02-12 ENCOUNTER — COMMUNICATION - HEALTHEAST (OUTPATIENT)
Dept: INTERNAL MEDICINE | Facility: CLINIC | Age: 65
End: 2020-02-12

## 2020-02-12 DIAGNOSIS — F34.1 DYSTHYMIC DISORDER: ICD-10-CM

## 2020-02-16 ENCOUNTER — HEALTH MAINTENANCE LETTER (OUTPATIENT)
Age: 65
End: 2020-02-16

## 2020-02-19 ENCOUNTER — PATIENT OUTREACH (OUTPATIENT)
Dept: ONCOLOGY | Facility: CLINIC | Age: 65
End: 2020-02-19

## 2020-02-19 NOTE — PROGRESS NOTES
Returned call to patient on her cell phone as requested and left a VM to return call to writer to arrange a sooner appointment than May with a new left areolar mass. Kaleigh Mccray RN, BSN  Breast Center Nurse Coordinator

## 2020-03-05 ENCOUNTER — RECORDS - HEALTHEAST (OUTPATIENT)
Dept: ADMINISTRATIVE | Facility: OTHER | Age: 65
End: 2020-03-05

## 2020-03-05 ENCOUNTER — ONCOLOGY VISIT (OUTPATIENT)
Dept: ONCOLOGY | Facility: CLINIC | Age: 65
End: 2020-03-05
Attending: PHYSICIAN ASSISTANT
Payer: MEDICARE

## 2020-03-05 VITALS
TEMPERATURE: 97.7 F | SYSTOLIC BLOOD PRESSURE: 123 MMHG | DIASTOLIC BLOOD PRESSURE: 70 MMHG | HEART RATE: 85 BPM | RESPIRATION RATE: 16 BRPM | OXYGEN SATURATION: 98 % | WEIGHT: 176 LBS | HEIGHT: 64 IN | BODY MASS INDEX: 30.05 KG/M2

## 2020-03-05 DIAGNOSIS — R35.0 URINARY FREQUENCY: Primary | ICD-10-CM

## 2020-03-05 LAB
ALBUMIN UR-MCNC: NEGATIVE MG/DL
APPEARANCE UR: CLEAR
BILIRUB UR QL STRIP: NEGATIVE
COLOR UR AUTO: YELLOW
GLUCOSE UR STRIP-MCNC: NEGATIVE MG/DL
HGB UR QL STRIP: NEGATIVE
KETONES UR STRIP-MCNC: NEGATIVE MG/DL
LEUKOCYTE ESTERASE UR QL STRIP: NEGATIVE
MUCOUS THREADS #/AREA URNS LPF: PRESENT /LPF
NITRATE UR QL: NEGATIVE
PH UR STRIP: 5 PH (ref 5–7)
RBC #/AREA URNS AUTO: 2 /HPF (ref 0–2)
SOURCE: ABNORMAL
SP GR UR STRIP: 1.02 (ref 1–1.03)
SQUAMOUS #/AREA URNS AUTO: <1 /HPF (ref 0–1)
UROBILINOGEN UR STRIP-MCNC: 0 MG/DL (ref 0–2)
WBC #/AREA URNS AUTO: 2 /HPF (ref 0–5)

## 2020-03-05 PROCEDURE — 81001 URINALYSIS AUTO W/SCOPE: CPT | Performed by: PHYSICIAN ASSISTANT

## 2020-03-05 PROCEDURE — 99213 OFFICE O/P EST LOW 20 MIN: CPT | Mod: ZP | Performed by: PHYSICIAN ASSISTANT

## 2020-03-05 PROCEDURE — G0463 HOSPITAL OUTPT CLINIC VISIT: HCPCS | Mod: ZF

## 2020-03-05 ASSESSMENT — PAIN SCALES - GENERAL: PAINLEVEL: NO PAIN (0)

## 2020-03-05 ASSESSMENT — MIFFLIN-ST. JEOR: SCORE: 1323.33

## 2020-03-05 NOTE — PROGRESS NOTES
"Hematology-Oncology Visit  Mar 5, 2020    Reason for Visit: L breast concern     HPI: Cinthya Waite is a 64 year old female with history of stage IIA, T2N0M0, ER/RI positive, HER2 negative, invasive ductal carcinoma of the left breast diagnosed in 2007. SHe had a lumpectomy 8/2007 followed by adjuvant TC. She completed radiation therapy in 1/2008. She was initially on Tamoxifen for one year and then transitioned to Arimidex 8/2008. Completed treatment 8/2013. She has been following every 6 months since then.     Interval History: Cinthya is here today because she noticed a change in her skin. Has a red lesion and a white area bordering this. The area felt \"funny\" at first and now is back to normal. This has been going on for 3 weeks. No breast pain or lumps/bumps. No other concerns beyond urinary frequency and would like to test for UTI.     Current Outpatient Medications   Medication     acetaminophen (TYLENOL) 325 MG tablet     amLODIPine (NORVASC) 5 MG tablet     aspirin 81 MG tablet     atorvastatin (LIPITOR) 40 MG tablet     buPROPion (BUPROBAN) 150 MG 12 hr tablet     busPIRone (BUSPAR) 10 MG tablet     Calcium Citrate 200 MG TABS     clobetasol (TEMOVATE) 0.05 % external ointment     Denosumab (PROLIA SC)     hydrochlorothiazide (HYDRODIURIL) 25 MG tablet     levothyroxine (SYNTHROID, LEVOTHROID) 125 MCG tablet     Vitamin D, Cholecalciferol, 1000 UNITS TABS     No current facility-administered medications for this visit.        PHYSICAL EXAM:  /70   Pulse 85   Temp 97.7  F (36.5  C) (Oral)   Resp 16   Ht 1.626 m (5' 4\")   Wt 79.8 kg (176 lb)   LMP 06/07/2007   SpO2 98%   BMI 30.21 kg/m    General: Alert, oriented, pleasant, NAD  Breast: Has mottled skin appearance to L breast from radiation. Area of concern is cherry angioma and normal skin tone underneath, similar to the rest of breast tissue. No palpable lump or nodularity. Has fibrosis of R lumpectomy incision--stable per notes. "     Labs:    3/5/2020 14:39   Color Urine Yellow   Appearance Urine Clear   Glucose Urine Negative   Bilirubin Urine Negative   Ketones Urine Negative   Specific Gravity Urine 1.020   pH Urine 5.0   Protein Albumin Urine Negative   Urobilinogen mg/dL 0.0   Nitrite Urine Negative   Blood Urine Negative   Leukocyte Esterase Urine Negative   Source Midstream Urine   WBC Urine 2   RBC Urine 2   Squamous Epithelial /HPF Urine <1   Mucous Urine Present (A)       Assessment & Plan:     1. Breast concern: Skin findings are benign. She will keep follow-up with Dr. Herrera in May. Then she is interested in being seen annually only in survivorship clinic which is reasonable.     2. Urinary frequency: UA normal. Follow-up with PCP.     Wilma Crisostomo PA-C    [facility]  [address] [place] [id number] [phone]                             Patient/Caregiver provided printed discharge information.

## 2020-03-05 NOTE — LETTER
"3/5/2020      RE: Cinthya Waite  [address]  [place] [address]       Hematology-Oncology Visit  Mar 5, 2020    Reason for Visit: L breast concern     HPI: Cinthya Waite is a 64 year old female with history of stage IIA, T2N0M0, ER/NC positive, HER2 negative, invasive ductal carcinoma of the left breast diagnosed in 2007. SHe had a lumpectomy 8/2007 followed by adjuvant TC. She completed radiation therapy in 1/2008. She was initially on Tamoxifen for one year and then transitioned to Arimidex 8/2008. Completed treatment 8/2013. She has been following every 6 months since then.     Interval History: Cinthya is here today because she noticed a change in her skin. Has a red lesion and a white area bordering this. The area felt \"funny\" at first and now is back to normal. This has been going on for 3 weeks. No breast pain or lumps/bumps. No other concerns beyond urinary frequency and would like to test for UTI.     Current Outpatient Medications   Medication     acetaminophen (TYLENOL) 325 MG tablet     amLODIPine (NORVASC) 5 MG tablet     aspirin 81 MG tablet     atorvastatin (LIPITOR) 40 MG tablet     buPROPion (BUPROBAN) 150 MG 12 hr tablet     busPIRone (BUSPAR) 10 MG tablet     Calcium Citrate 200 MG TABS     clobetasol (TEMOVATE) 0.05 % external ointment     Denosumab (PROLIA SC)     hydrochlorothiazide (HYDRODIURIL) 25 MG tablet     levothyroxine (SYNTHROID, LEVOTHROID) 125 MCG tablet     Vitamin D, Cholecalciferol, 1000 UNITS TABS     No current facility-administered medications for this visit.        PHYSICAL EXAM:  /70   Pulse 85   Temp 97.7  F (36.5  C) (Oral)   Resp 16   Ht 1.626 m (5' 4\")   Wt 79.8 kg (176 lb)   LMP 06/07/2007   SpO2 98%   BMI 30.21 kg/m     General: Alert, oriented, pleasant, NAD  Breast: Has mottled skin appearance to L breast from radiation. Area of concern is cherry angioma and normal skin tone underneath, similar to the rest of breast " tissue. No palpable lump or nodularity. Has fibrosis of R lumpectomy incision--stable per notes.     Labs:    3/5/2020 14:39   Color Urine Yellow   Appearance Urine Clear   Glucose Urine Negative   Bilirubin Urine Negative   Ketones Urine Negative   Specific Gravity Urine 1.020   pH Urine 5.0   Protein Albumin Urine Negative   Urobilinogen mg/dL 0.0   Nitrite Urine Negative   Blood Urine Negative   Leukocyte Esterase Urine Negative   Source Midstream Urine   WBC Urine 2   RBC Urine 2   Squamous Epithelial /HPF Urine <1   Mucous Urine Present (A)       Assessment & Plan:     1. Breast concern: Skin findings are benign. She will keep follow-up with Dr. Herrera in May. Then she is interested in being seen annually only in survivorship clinic which is reasonable.     2. Urinary frequency: UA normal. Follow-up with PCP.     Wilma Crisostomo PA-C    [facility]  [address] [place] [id number]  [phone]                              Wilma Crisostomo PA-C

## 2020-03-05 NOTE — NURSING NOTE
"Oncology Rooming Note    March 5, 2020 2:24 PM   Cinthya Waite is a 64 year old female who presents for:    Chief Complaint   Patient presents with     Oncology Clinic Visit     Return; Malignant neoplasm of upper-inner quadrant of left breast in female, estrogen receptor positive      Initial Vitals: /70   Pulse 85   Temp 97.7  F (36.5  C) (Oral)   Resp 16   Ht 1.626 m (5' 4\")   Wt 79.8 kg (176 lb)   LMP 06/07/2007   SpO2 98%   BMI 30.21 kg/m   Estimated body mass index is 30.21 kg/m  as calculated from the following:    Height as of this encounter: 1.626 m (5' 4\").    Weight as of this encounter: 79.8 kg (176 lb). Body surface area is 1.9 meters squared.  No Pain (0) Comment: Data Unavailable   Patient's last menstrual period was 06/07/2007.  Allergies reviewed: Yes  Medications reviewed: Yes    Medications: Medication refills not needed today.  Pharmacy name entered into [employer] App:    [id number] [place], [place] - [address] [place] [facility] - [place], [place] - [address]    Clinical concerns: Pt states she would like provider to take a look at her left breast she notice 3 weeks ago she noticed it felt and looked a little different and there is a white thing thing on it       Latonia Mckenna CMA              "

## 2020-04-28 ENCOUNTER — COMMUNICATION - HEALTHEAST (OUTPATIENT)
Dept: INTERNAL MEDICINE | Facility: CLINIC | Age: 65
End: 2020-04-28

## 2020-04-28 ENCOUNTER — MEDICAL CORRESPONDENCE (OUTPATIENT)
Dept: HEALTH INFORMATION MANAGEMENT | Facility: CLINIC | Age: 65
End: 2020-04-28

## 2020-04-28 DIAGNOSIS — E03.9 ACQUIRED HYPOTHYROIDISM: ICD-10-CM

## 2020-04-28 DIAGNOSIS — E03.9 ACQUIRED HYPOTHYROIDISM: Primary | ICD-10-CM

## 2020-04-28 DIAGNOSIS — E78.00 HYPERCHOLESTEROLEMIA: ICD-10-CM

## 2020-04-28 DIAGNOSIS — I10 ESSENTIAL HYPERTENSION: ICD-10-CM

## 2020-04-29 ENCOUNTER — COMMUNICATION - HEALTHEAST (OUTPATIENT)
Dept: INTERNAL MEDICINE | Facility: CLINIC | Age: 65
End: 2020-04-29

## 2020-04-29 DIAGNOSIS — I10 HYPERTENSION: ICD-10-CM

## 2020-05-07 ENCOUNTER — PATIENT OUTREACH (OUTPATIENT)
Dept: ONCOLOGY | Facility: CLINIC | Age: 65
End: 2020-05-07

## 2020-05-08 ENCOUNTER — COMMUNICATION - HEALTHEAST (OUTPATIENT)
Dept: INTERNAL MEDICINE | Facility: CLINIC | Age: 65
End: 2020-05-08

## 2020-05-10 ENCOUNTER — COMMUNICATION - HEALTHEAST (OUTPATIENT)
Dept: INTERNAL MEDICINE | Facility: CLINIC | Age: 65
End: 2020-05-10

## 2020-05-18 ENCOUNTER — COMMUNICATION - HEALTHEAST (OUTPATIENT)
Dept: INTERNAL MEDICINE | Facility: CLINIC | Age: 65
End: 2020-05-18

## 2020-05-19 ENCOUNTER — OFFICE VISIT - HEALTHEAST (OUTPATIENT)
Dept: INTERNAL MEDICINE | Facility: CLINIC | Age: 65
End: 2020-05-19

## 2020-05-19 DIAGNOSIS — Z00.00 ENCOUNTER FOR PREVENTIVE CARE: ICD-10-CM

## 2020-05-19 DIAGNOSIS — K59.01 SLOW TRANSIT CONSTIPATION: ICD-10-CM

## 2020-05-19 DIAGNOSIS — I10 ESSENTIAL HYPERTENSION: ICD-10-CM

## 2020-05-19 DIAGNOSIS — C50.912 MALIGNANT NEOPLASM OF LEFT FEMALE BREAST, UNSPECIFIED ESTROGEN RECEPTOR STATUS, UNSPECIFIED SITE OF BREAST (H): ICD-10-CM

## 2020-05-19 DIAGNOSIS — M81.0 AGE-RELATED OSTEOPOROSIS WITHOUT CURRENT PATHOLOGICAL FRACTURE: ICD-10-CM

## 2020-05-19 DIAGNOSIS — E03.9 ACQUIRED HYPOTHYROIDISM: ICD-10-CM

## 2020-05-19 DIAGNOSIS — F51.01 PRIMARY INSOMNIA: ICD-10-CM

## 2020-05-19 ASSESSMENT — PATIENT HEALTH QUESTIONNAIRE - PHQ9: SUM OF ALL RESPONSES TO PHQ QUESTIONS 1-9: 2

## 2020-05-23 ENCOUNTER — COMMUNICATION - HEALTHEAST (OUTPATIENT)
Dept: INTERNAL MEDICINE | Facility: CLINIC | Age: 65
End: 2020-05-23

## 2020-05-23 DIAGNOSIS — I10 ESSENTIAL HYPERTENSION: ICD-10-CM

## 2020-05-31 ENCOUNTER — COMMUNICATION - HEALTHEAST (OUTPATIENT)
Dept: INTERNAL MEDICINE | Facility: CLINIC | Age: 65
End: 2020-05-31

## 2020-06-19 ENCOUNTER — RECORDS - HEALTHEAST (OUTPATIENT)
Dept: ADMINISTRATIVE | Facility: OTHER | Age: 65
End: 2020-06-19

## 2020-06-26 ENCOUNTER — COMMUNICATION - HEALTHEAST (OUTPATIENT)
Dept: INTERNAL MEDICINE | Facility: CLINIC | Age: 65
End: 2020-06-26

## 2020-06-26 DIAGNOSIS — M81.0 AGE-RELATED OSTEOPOROSIS WITHOUT CURRENT PATHOLOGICAL FRACTURE: ICD-10-CM

## 2020-06-26 DIAGNOSIS — C50.912 MALIGNANT NEOPLASM OF LEFT FEMALE BREAST, UNSPECIFIED ESTROGEN RECEPTOR STATUS, UNSPECIFIED SITE OF BREAST (H): ICD-10-CM

## 2020-06-29 ENCOUNTER — AMBULATORY - HEALTHEAST (OUTPATIENT)
Dept: LAB | Facility: CLINIC | Age: 65
End: 2020-06-29

## 2020-06-29 ENCOUNTER — AMBULATORY - HEALTHEAST (OUTPATIENT)
Dept: NURSING | Facility: CLINIC | Age: 65
End: 2020-06-29

## 2020-06-29 DIAGNOSIS — I10 ESSENTIAL HYPERTENSION: ICD-10-CM

## 2020-06-29 DIAGNOSIS — E78.00 HYPERCHOLESTEROLEMIA: ICD-10-CM

## 2020-06-29 DIAGNOSIS — E03.9 ACQUIRED HYPOTHYROIDISM: ICD-10-CM

## 2020-06-29 DIAGNOSIS — M81.0 AGE-RELATED OSTEOPOROSIS WITHOUT CURRENT PATHOLOGICAL FRACTURE: ICD-10-CM

## 2020-06-29 DIAGNOSIS — C50.912 MALIGNANT NEOPLASM OF LEFT FEMALE BREAST, UNSPECIFIED ESTROGEN RECEPTOR STATUS, UNSPECIFIED SITE OF BREAST (H): ICD-10-CM

## 2020-06-29 LAB
ALBUMIN SERPL-MCNC: 3.5 G/DL (ref 3.5–5)
ALP SERPL-CCNC: 79 U/L (ref 45–120)
ALT SERPL W P-5'-P-CCNC: 15 U/L (ref 0–45)
ANION GAP SERPL CALCULATED.3IONS-SCNC: 9 MMOL/L (ref 5–18)
AST SERPL W P-5'-P-CCNC: 16 U/L (ref 0–40)
BILIRUB DIRECT SERPL-MCNC: 0.3 MG/DL
BILIRUB SERPL-MCNC: 0.8 MG/DL (ref 0–1)
BUN SERPL-MCNC: 15 MG/DL (ref 8–22)
CALCIUM SERPL-MCNC: 9.3 MG/DL (ref 8.5–10.5)
CHLORIDE BLD-SCNC: 105 MMOL/L (ref 98–107)
CHOLEST SERPL-MCNC: 195 MG/DL
CO2 SERPL-SCNC: 26 MMOL/L (ref 22–31)
CREAT SERPL-MCNC: 0.86 MG/DL (ref 0.6–1.1)
FASTING STATUS PATIENT QL REPORTED: YES
GFR SERPL CREATININE-BSD FRML MDRD: >60 ML/MIN/1.73M2
GLUCOSE BLD-MCNC: 93 MG/DL (ref 70–125)
HDLC SERPL-MCNC: 56 MG/DL
LDLC SERPL CALC-MCNC: 114 MG/DL
POTASSIUM BLD-SCNC: 4 MMOL/L (ref 3.5–5)
PROT SERPL-MCNC: 6.3 G/DL (ref 6–8)
SODIUM SERPL-SCNC: 140 MMOL/L (ref 136–145)
TRIGL SERPL-MCNC: 123 MG/DL
TSH SERPL DL<=0.005 MIU/L-ACNC: 4.13 UIU/ML (ref 0.3–5)

## 2020-06-30 LAB — 25(OH)D3 SERPL-MCNC: 58.4 NG/ML (ref 30–80)

## 2020-08-09 NOTE — PROGRESS NOTES
HISTORY OF PRESENT ILLNESS:  Cinthya Waite is a 64-year-old woman with a history of stage IIA, T2 N0 M0, ER positive, WI positive and HER-2 negative invasive ductal carcinoma of the left breast diagnosed in 2007.  She is status post lumpectomy 08/05/2007 followed by 4 cycles of Taxotere and Cyclophosphamide. Patient completed radiation therapy in 01/2008. She initiated Tamoxifen and was on it for one year and when she became post-menopausal, she was changed to Arimidex in 08/2008.  She was on adjuvant hormonal therapy for a total of 5 years.  There is a small seroma at the site of the lumpectomy just above the incision.  It is also possible there could be some fat necrosis there.  This area of firmness has been stable.  She has been followed by our clinic every 6 months and we will alternate followup with Michelle Cole.  She has been followed by yearly mammographic surveillance.      INTERVAL HISTORY:   Cinthya returns to clinic to go over the results of her mammogram which showed benign findings.  She is overall feeling well and has no specific concerns today.  She has been staying physically active riding her bike and even rode her bike to the clinic visit today (which is about 10 miles from home).  She gets at least 150 minutes of exercise per week, and attempts to eat a healthy (mostly vegetarian) diet.  No new lumps or bumps.  No recent fevers, chills or infectious symptoms.      REVIEW OF SYSTEMS:  The remainder of a comprehensive ROS was otherwise negative or non-contributory.     PHYSICAL EXAM:  No vital signs were obtained today.  General - well appearing woman, seated in chair in no acute distress  HEENT - EOMI, sclera are clear, wearing a mask  Heart - RRR, no murmurs  Lungs - CTAB, normal respiratory effort  Breast - Examination of right and left breast both reveals inverted nipples.  No masses in the right breast.  The left breast reveals no masses.  The lumpectomy incision at the 12-o'clock  position is well healed without erythema or masses.  There is some area of firmness consistent with fat necrosis essentially unchanged just above the well-healed incision.  No other masses in the left breast.  The left axillary incision is well healed without erythema or masses.   Abdomen - soft and non-tender, non-distended  Extremities - no edema  Neuro - alert and fully oriented, no focal deficits  Psych - mood and affect are appropriate     IMAGING:    Bilateral mammogram from today reviewed with Dr. Bacon and no evolving changes.     ASSESSMENT AND PLAN:   1.  Cinthya Waite is a 64-year-old woman with a history of stage IIA, T2N0M0, ER-positive, MI-positive and HER2-negative invasive ductal carcinoma of the left breast diagnosed in 2007, s/p lumpectomy.  She completed 4 cycles of taxotere/cyclophosphamide, followed by 5 years of adjuvant hormonal therapy. There is a small seroma or fat necrosis at the lumpectomy site just above the incision which has been essentially unchanged.  Our plan now is to transition her follow-up care to the Survivors Clinic with an HALEY annually.  Cinthya is in agreement with this plan.  She is more than 10 years out from her breast cancer diagnosis.  She is off of adjuvant hormonal therapy and is doing quite well.   2.  Followup in the Survivors Clinic annually with mammogram.   3.  Continue with calcium 1000 mg and vitamin D 1000 units daily.   4.  Continue with exercise of 150 minutes a week.   5.  Continue with a diet that is low in saturated fat.   6.  Bone health.  Prolia in February 2019 and every 6 months with her endocrinologist, more recently managed by PCP.  7.  Follow-up.  Follow up in one year in Survivor's clinic with annual mammogram.      Patient was seen and discussed with Dr. Johnathan Herrera.    Jasmin Agrawal MD/PhD  Heme/Onc Fellow       Thank you for allowing us to continue to participate in Cinthya Waite' care.      Johnathan Herrera MD       [facility]      I spent 20 minutes with the patient more than 50% of which was in counseling and coordination of care.

## 2020-08-11 ENCOUNTER — ANCILLARY PROCEDURE (OUTPATIENT)
Dept: MAMMOGRAPHY | Facility: CLINIC | Age: 65
End: 2020-08-11
Payer: MEDICARE

## 2020-08-11 ENCOUNTER — RECORDS - HEALTHEAST (OUTPATIENT)
Dept: ADMINISTRATIVE | Facility: OTHER | Age: 65
End: 2020-08-11

## 2020-08-11 ENCOUNTER — ONCOLOGY VISIT (OUTPATIENT)
Dept: ONCOLOGY | Facility: CLINIC | Age: 65
End: 2020-08-11
Attending: INTERNAL MEDICINE
Payer: MEDICARE

## 2020-08-11 DIAGNOSIS — Z12.31 VISIT FOR SCREENING MAMMOGRAM: ICD-10-CM

## 2020-08-11 DIAGNOSIS — C50.212 MALIGNANT NEOPLASM OF UPPER-INNER QUADRANT OF LEFT BREAST IN FEMALE, ESTROGEN RECEPTOR POSITIVE (H): Primary | ICD-10-CM

## 2020-08-11 DIAGNOSIS — Z17.0 MALIGNANT NEOPLASM OF UPPER-INNER QUADRANT OF LEFT BREAST IN FEMALE, ESTROGEN RECEPTOR POSITIVE (H): Primary | ICD-10-CM

## 2020-08-11 PROCEDURE — 40001009 ZZH VIDEO/TELEPHONE VISIT; NO CHARGE

## 2020-08-11 PROCEDURE — 99213 OFFICE O/P EST LOW 20 MIN: CPT | Mod: GC | Performed by: INTERNAL MEDICINE

## 2020-08-11 PROCEDURE — G0463 HOSPITAL OUTPT CLINIC VISIT: HCPCS

## 2020-08-11 RX ORDER — LISINOPRIL 2.5 MG/1
2.5 TABLET ORAL DAILY
COMMUNITY
End: 2022-12-02

## 2020-08-11 NOTE — NURSING NOTE
"Oncology Rooming Note    August 11, 2020 12:44 PM   Cinthya Waite is a 64 year old female who presents for:    Chief Complaint   Patient presents with     Oncology Clinic Visit     Pt is here for a rtn for Breast Cancer     Initial Vitals: Last Menstrual Period 06/07/2007  Estimated body mass index is 30.21 kg/m  as calculated from the following:    Height as of 3/5/20: 1.626 m (5' 4\").    Weight as of 3/5/20: 79.8 kg (176 lb). There is no height or weight on file to calculate BSA.  Data Unavailable Comment: Data Unavailable   Patient's last menstrual period was 06/07/2007.  Allergies reviewed: Yes  Medications reviewed: Yes    Medications: Medication refills not needed today.  Pharmacy name entered into basestone:    [id number] - [place], [place] - [address] [facility] - [place], [place] - [address] [facility]    Clinical concerns: none       Nell Leon MA            "

## 2020-08-11 NOTE — LETTER
8/11/2020         RE: Cinthya Waite  [address]  [place] [address]        Dear Colleague,    Thank you for referring your patient, Cinthya Waite, to the [facility]. Please see a copy of my visit note below.    HISTORY OF PRESENT ILLNESS:  Cinthya Waite is a 64-year-old woman with a history of stage IIA, T2 N0 M0, ER positive, MO positive and HER-2 negative invasive ductal carcinoma of the left breast diagnosed in 2007.  She is status post lumpectomy 08/05/2007 followed by 4 cycles of Taxotere and Cyclophosphamide. Patient completed radiation therapy in 01/2008. She initiated Tamoxifen and was on it for one year and when she became post-menopausal, she was changed to Arimidex in 08/2008.  She was on adjuvant hormonal therapy for a total of 5 years.  There is a small seroma at the site of the lumpectomy just above the incision.  It is also possible there could be some fat necrosis there.  This area of firmness has been stable.  She has been followed by our clinic every 6 months and we will alternate followup with Michelle Cole.  She has been followed by yearly mammographic surveillance.      INTERVAL HISTORY:   Cinthya returns to clinic to go over the results of her mammogram which showed benign findings.  She is overall feeling well and has no specific concerns today.  She has been staying physically active riding her bike and even rode her bike to the clinic visit today (which is about 10 miles from home).  She gets at least 150 minutes of exercise per week, and attempts to eat a healthy (mostly vegetarian) diet.  No new lumps or bumps.  No recent fevers, chills or infectious symptoms.      REVIEW OF SYSTEMS:  The remainder of a comprehensive ROS was otherwise negative or non-contributory.     PHYSICAL EXAM:  No vital signs were obtained today.  General - well appearing woman, seated in chair in no acute distress  HEENT - EOMI, sclera are clear,  wearing a mask  Heart - RRR, no murmurs  Lungs - CTAB, normal respiratory effort  Breast - Examination of right and left breast both reveals inverted nipples.  No masses in the right breast.  The left breast reveals no masses.  The lumpectomy incision at the 12-o'clock position is well healed without erythema or masses.  There is some area of firmness consistent with fat necrosis essentially unchanged just above the well-healed incision.  No other masses in the left breast.  The left axillary incision is well healed without erythema or masses.   Abdomen - soft and non-tender, non-distended  Extremities - no edema  Neuro - alert and fully oriented, no focal deficits  Psych - mood and affect are appropriate     IMAGING:    Bilateral mammogram from today reviewed with Dr. Bacon and no evolving changes.     ASSESSMENT AND PLAN:   1.  Cinthya Waite is a 64-year-old woman with a history of stage IIA, T2N0M0, ER-positive, MO-positive and HER2-negative invasive ductal carcinoma of the left breast diagnosed in 2007, s/p lumpectomy.  She completed 4 cycles of taxotere/cyclophosphamide, followed by 5 years of adjuvant hormonal therapy. There is a small seroma or fat necrosis at the lumpectomy site just above the incision which has been essentially unchanged.  Our plan now is to transition her follow-up care to the Survivors Clinic with an HALEY annually.  Cinthya is in agreement with this plan.  She is more than 10 years out from her breast cancer diagnosis.  She is off of adjuvant hormonal therapy and is doing quite well.   2.  Followup in the Survivors Clinic annually with mammogram.   3.  Continue with calcium 1000 mg and vitamin D 1000 units daily.   4.  Continue with exercise of 150 minutes a week.   5.  Continue with a diet that is low in saturated fat.   6.  Bone health.  Prolia in February 2019 and every 6 months with her endocrinologist, more recently managed by PCP.  7.  Follow-up.  Follow up in one year in Survivor's  clinic with annual mammogram.      Patient was seen and discussed with Dr. Johnathan Herrera.    Jasmin Agrawal MD/PhD  Heme/Onc Fellow       Thank you for allowing us to continue to participate in Cinthya Waite' care.      Johnathan Herrera MD      [facility]      I spent 20 minutes with the patient more than 50% of which was in counseling and coordination of care.     Again, thank you for allowing me to participate in the care of your patient.        Sincerely,        Johnathan Herrera MD

## 2020-08-14 ENCOUNTER — COMMUNICATION - HEALTHEAST (OUTPATIENT)
Dept: INTERNAL MEDICINE | Facility: CLINIC | Age: 65
End: 2020-08-14

## 2020-09-19 ENCOUNTER — AMBULATORY - HEALTHEAST (OUTPATIENT)
Dept: NURSING | Facility: CLINIC | Age: 65
End: 2020-09-19

## 2020-10-17 ENCOUNTER — COMMUNICATION - HEALTHEAST (OUTPATIENT)
Dept: INTERNAL MEDICINE | Facility: CLINIC | Age: 65
End: 2020-10-17

## 2020-10-29 ENCOUNTER — COMMUNICATION - HEALTHEAST (OUTPATIENT)
Dept: INTERNAL MEDICINE | Facility: CLINIC | Age: 65
End: 2020-10-29

## 2020-10-30 ENCOUNTER — OFFICE VISIT - HEALTHEAST (OUTPATIENT)
Dept: INTERNAL MEDICINE | Facility: CLINIC | Age: 65
End: 2020-10-30

## 2020-10-30 DIAGNOSIS — F51.01 PRIMARY INSOMNIA: ICD-10-CM

## 2020-10-30 DIAGNOSIS — Z00.00 PREVENTATIVE HEALTH CARE: ICD-10-CM

## 2020-10-30 DIAGNOSIS — M81.0 AGE-RELATED OSTEOPOROSIS WITHOUT CURRENT PATHOLOGICAL FRACTURE: ICD-10-CM

## 2020-10-30 DIAGNOSIS — E03.9 ACQUIRED HYPOTHYROIDISM: ICD-10-CM

## 2020-10-30 DIAGNOSIS — I10 ESSENTIAL HYPERTENSION: ICD-10-CM

## 2020-10-30 DIAGNOSIS — C50.912 MALIGNANT NEOPLASM OF LEFT FEMALE BREAST, UNSPECIFIED ESTROGEN RECEPTOR STATUS, UNSPECIFIED SITE OF BREAST (H): ICD-10-CM

## 2020-10-30 DIAGNOSIS — F34.1 DYSTHYMIC DISORDER: ICD-10-CM

## 2020-10-30 DIAGNOSIS — E78.00 HYPERCHOLESTEROLEMIA: ICD-10-CM

## 2020-10-30 ASSESSMENT — PATIENT HEALTH QUESTIONNAIRE - PHQ9: SUM OF ALL RESPONSES TO PHQ QUESTIONS 1-9: 1

## 2020-11-08 ENCOUNTER — COMMUNICATION - HEALTHEAST (OUTPATIENT)
Dept: INTERNAL MEDICINE | Facility: CLINIC | Age: 65
End: 2020-11-08

## 2020-11-08 DIAGNOSIS — E03.9 HYPOTHYROIDISM, UNSPECIFIED TYPE: ICD-10-CM

## 2020-11-08 DIAGNOSIS — E03.9 ACQUIRED HYPOTHYROIDISM: ICD-10-CM

## 2020-11-22 ENCOUNTER — COMMUNICATION - HEALTHEAST (OUTPATIENT)
Dept: INTERNAL MEDICINE | Facility: CLINIC | Age: 65
End: 2020-11-22

## 2020-11-22 ENCOUNTER — HEALTH MAINTENANCE LETTER (OUTPATIENT)
Age: 65
End: 2020-11-22

## 2020-11-22 DIAGNOSIS — I10 ESSENTIAL HYPERTENSION: ICD-10-CM

## 2020-12-30 ENCOUNTER — COMMUNICATION - HEALTHEAST (OUTPATIENT)
Dept: INTERNAL MEDICINE | Facility: CLINIC | Age: 65
End: 2020-12-30

## 2020-12-30 ENCOUNTER — MYC MEDICAL ADVICE (OUTPATIENT)
Dept: OBGYN | Facility: CLINIC | Age: 65
End: 2020-12-30

## 2020-12-30 DIAGNOSIS — L90.0 LICHEN SCLEROSUS: ICD-10-CM

## 2020-12-30 NOTE — TELEPHONE ENCOUNTER
[employer] message received regarding refill request for Clobetasol Propionate Ointment, patient has not been seen in clinic since 12/2019 and is not wanting to come in to clinic d/t Covid.     Pended to Dr. Cbaallero for review.     [employer] message sent to patient to let her know request was forwarded to Dr. Caballero.

## 2021-01-04 ENCOUNTER — COMMUNICATION - HEALTHEAST (OUTPATIENT)
Dept: INTERNAL MEDICINE | Facility: CLINIC | Age: 66
End: 2021-01-04

## 2021-01-04 DIAGNOSIS — N30.00 ACUTE CYSTITIS WITHOUT HEMATURIA: ICD-10-CM

## 2021-01-05 ENCOUNTER — COMMUNICATION - HEALTHEAST (OUTPATIENT)
Dept: LAB | Facility: CLINIC | Age: 66
End: 2021-01-05

## 2021-01-05 ENCOUNTER — COMMUNICATION - HEALTHEAST (OUTPATIENT)
Dept: INTERNAL MEDICINE | Facility: CLINIC | Age: 66
End: 2021-01-05

## 2021-01-05 DIAGNOSIS — Z92.29 PERSONAL HISTORY OF OTHER DRUG THERAPY: ICD-10-CM

## 2021-01-05 DIAGNOSIS — M81.0 OSTEOPOROSIS: ICD-10-CM

## 2021-01-05 RX ORDER — CLOBETASOL PROPIONATE 0.5 MG/G
OINTMENT TOPICAL
Qty: 45 G | Refills: 3 | Status: SHIPPED | OUTPATIENT
Start: 2021-01-05 | End: 2022-11-03

## 2021-01-12 ENCOUNTER — COMMUNICATION - HEALTHEAST (OUTPATIENT)
Dept: INTERNAL MEDICINE | Facility: CLINIC | Age: 66
End: 2021-01-12

## 2021-01-13 ENCOUNTER — AMBULATORY - HEALTHEAST (OUTPATIENT)
Dept: NURSING | Facility: CLINIC | Age: 66
End: 2021-01-13

## 2021-01-21 ENCOUNTER — COMMUNICATION - HEALTHEAST (OUTPATIENT)
Dept: INTERNAL MEDICINE | Facility: CLINIC | Age: 66
End: 2021-01-21

## 2021-01-28 ENCOUNTER — COMMUNICATION - HEALTHEAST (OUTPATIENT)
Dept: INTERNAL MEDICINE | Facility: CLINIC | Age: 66
End: 2021-01-28

## 2021-02-01 ENCOUNTER — COMMUNICATION - HEALTHEAST (OUTPATIENT)
Dept: INTERNAL MEDICINE | Facility: CLINIC | Age: 66
End: 2021-02-01

## 2021-02-01 DIAGNOSIS — E78.00 HYPERCHOLESTEROLEMIA: ICD-10-CM

## 2021-02-04 ENCOUNTER — COMMUNICATION - HEALTHEAST (OUTPATIENT)
Dept: INTERNAL MEDICINE | Facility: CLINIC | Age: 66
End: 2021-02-04

## 2021-02-04 DIAGNOSIS — I10 HYPERTENSION: ICD-10-CM

## 2021-02-05 ENCOUNTER — AMBULATORY - HEALTHEAST (OUTPATIENT)
Dept: LAB | Facility: CLINIC | Age: 66
End: 2021-02-05

## 2021-02-05 DIAGNOSIS — I10 ESSENTIAL HYPERTENSION: ICD-10-CM

## 2021-02-05 DIAGNOSIS — E03.9 ACQUIRED HYPOTHYROIDISM: ICD-10-CM

## 2021-02-05 DIAGNOSIS — E78.00 HYPERCHOLESTEROLEMIA: ICD-10-CM

## 2021-02-05 DIAGNOSIS — Z00.00 PREVENTATIVE HEALTH CARE: ICD-10-CM

## 2021-02-05 LAB
ALBUMIN SERPL-MCNC: 4 G/DL (ref 3.5–5)
ALP SERPL-CCNC: 87 U/L (ref 45–120)
ALT SERPL W P-5'-P-CCNC: 17 U/L (ref 0–45)
ANION GAP SERPL CALCULATED.3IONS-SCNC: 10 MMOL/L (ref 5–18)
AST SERPL W P-5'-P-CCNC: 19 U/L (ref 0–40)
BILIRUB SERPL-MCNC: 0.7 MG/DL (ref 0–1)
BUN SERPL-MCNC: 17 MG/DL (ref 8–22)
CALCIUM SERPL-MCNC: 9 MG/DL (ref 8.5–10.5)
CHLORIDE BLD-SCNC: 107 MMOL/L (ref 98–107)
CHOLEST SERPL-MCNC: 204 MG/DL
CO2 SERPL-SCNC: 27 MMOL/L (ref 22–31)
CREAT SERPL-MCNC: 0.81 MG/DL (ref 0.6–1.1)
FASTING STATUS PATIENT QL REPORTED: YES
GFR SERPL CREATININE-BSD FRML MDRD: >60 ML/MIN/1.73M2
GLUCOSE BLD-MCNC: 94 MG/DL (ref 70–125)
HDLC SERPL-MCNC: 63 MG/DL
LDLC SERPL CALC-MCNC: 111 MG/DL
POTASSIUM BLD-SCNC: 4.6 MMOL/L (ref 3.5–5)
PROT SERPL-MCNC: 6.6 G/DL (ref 6–8)
SODIUM SERPL-SCNC: 144 MMOL/L (ref 136–145)
TRIGL SERPL-MCNC: 150 MG/DL
TSH SERPL DL<=0.005 MIU/L-ACNC: 0.66 UIU/ML (ref 0.3–5)

## 2021-02-08 LAB — HCV AB SERPL QL IA: NEGATIVE

## 2021-02-28 ENCOUNTER — COMMUNICATION - HEALTHEAST (OUTPATIENT)
Dept: INTERNAL MEDICINE | Facility: CLINIC | Age: 66
End: 2021-02-28

## 2021-02-28 DIAGNOSIS — F34.1 DYSTHYMIC DISORDER: ICD-10-CM

## 2021-03-23 ENCOUNTER — COMMUNICATION - HEALTHEAST (OUTPATIENT)
Dept: INTERNAL MEDICINE | Facility: CLINIC | Age: 66
End: 2021-03-23

## 2021-03-23 ENCOUNTER — RECORDS - HEALTHEAST (OUTPATIENT)
Dept: ADMINISTRATIVE | Facility: OTHER | Age: 66
End: 2021-03-23

## 2021-03-29 ENCOUNTER — RECORDS - HEALTHEAST (OUTPATIENT)
Dept: ADMINISTRATIVE | Facility: OTHER | Age: 66
End: 2021-03-29

## 2021-04-04 ENCOUNTER — HEALTH MAINTENANCE LETTER (OUTPATIENT)
Age: 66
End: 2021-04-04

## 2021-04-05 ENCOUNTER — RECORDS - HEALTHEAST (OUTPATIENT)
Dept: ADMINISTRATIVE | Facility: OTHER | Age: 66
End: 2021-04-05

## 2021-04-05 ENCOUNTER — RECORDS - HEALTHEAST (OUTPATIENT)
Dept: BONE DENSITY | Facility: CLINIC | Age: 66
End: 2021-04-05

## 2021-04-05 DIAGNOSIS — M81.0 AGE-RELATED OSTEOPOROSIS WITHOUT CURRENT PATHOLOGICAL FRACTURE: ICD-10-CM

## 2021-04-06 ENCOUNTER — PATIENT OUTREACH (OUTPATIENT)
Dept: ONCOLOGY | Facility: CLINIC | Age: 66
End: 2021-04-06

## 2021-04-06 ENCOUNTER — RECORDS - HEALTHEAST (OUTPATIENT)
Dept: ADMINISTRATIVE | Facility: OTHER | Age: 66
End: 2021-04-06

## 2021-04-06 ENCOUNTER — TELEPHONE (OUTPATIENT)
Dept: ONCOLOGY | Facility: CLINIC | Age: 66
End: 2021-04-06

## 2021-04-06 NOTE — PROGRESS NOTES
Spoke to patient with L breast pain that has woken her up at night occurring over the last month. Occasionally can palpate an area she describes near the lumpectomy incision at the 9-10 o'clock position. Has not had any activity that has brought on this activity. Will arrange breast imaging 4/7/2021 and an in person with Dr Herrera 4/8/2021 after discussing with Dr Herrera.  Answered all patient's questions and verbalized understanding. Kaleigh Mccray RN, BSN.

## 2021-04-06 NOTE — TELEPHONE ENCOUNTER
[facility] Telephone Triage Note    Assessment: Patient called in to triage reporting the following symptoms: has been having discomfort to left breast area, approximately at the 9-11 o'clock if facing her (medial). This happened once last month and for about a week this time with occasional lump felt that is neither hard nor soft, vascillates between. No change in skin appearance or texture, denies all other s/s.    Provider unavailable until 9:30. Most recently seen in August and recommended for annual survivorship visits. Routing to Dr Herrera to weigh in once available today.

## 2021-04-07 ENCOUNTER — PATIENT OUTREACH (OUTPATIENT)
Dept: ONCOLOGY | Facility: CLINIC | Age: 66
End: 2021-04-07

## 2021-04-07 ENCOUNTER — ANCILLARY PROCEDURE (OUTPATIENT)
Dept: MAMMOGRAPHY | Facility: CLINIC | Age: 66
End: 2021-04-07
Payer: COMMERCIAL

## 2021-04-07 DIAGNOSIS — Z85.3 HX OF BREAST CANCER: ICD-10-CM

## 2021-04-07 DIAGNOSIS — Z17.0 MALIGNANT NEOPLASM OF UPPER-INNER QUADRANT OF LEFT BREAST IN FEMALE, ESTROGEN RECEPTOR POSITIVE (H): ICD-10-CM

## 2021-04-07 DIAGNOSIS — C50.212 MALIGNANT NEOPLASM OF UPPER-INNER QUADRANT OF LEFT BREAST IN FEMALE, ESTROGEN RECEPTOR POSITIVE (H): ICD-10-CM

## 2021-04-07 PROCEDURE — 77066 DX MAMMO INCL CAD BI: CPT | Performed by: STUDENT IN AN ORGANIZED HEALTH CARE EDUCATION/TRAINING PROGRAM

## 2021-04-07 PROCEDURE — 76642 ULTRASOUND BREAST LIMITED: CPT | Mod: LT | Performed by: STUDENT IN AN ORGANIZED HEALTH CARE EDUCATION/TRAINING PROGRAM

## 2021-04-07 PROCEDURE — G0279 TOMOSYNTHESIS, MAMMO: HCPCS | Performed by: STUDENT IN AN ORGANIZED HEALTH CARE EDUCATION/TRAINING PROGRAM

## 2021-04-07 NOTE — PROGRESS NOTES
Called patient's cell and home phone and left a VM on both to cancel her appointment today with Lori Pope with having breast imaging with no findings of abnormal tissue. Instructed patient to return call to cancel the appointment. Kaleigh Mccray RN, BSN Breast Center Nurse Coordinator

## 2021-04-16 ENCOUNTER — RECORDS - HEALTHEAST (OUTPATIENT)
Dept: ADMINISTRATIVE | Facility: OTHER | Age: 66
End: 2021-04-16

## 2021-04-18 ENCOUNTER — COMMUNICATION - HEALTHEAST (OUTPATIENT)
Dept: INTERNAL MEDICINE | Facility: CLINIC | Age: 66
End: 2021-04-18

## 2021-04-18 DIAGNOSIS — R30.0 DYSURIA: ICD-10-CM

## 2021-04-20 ENCOUNTER — COMMUNICATION - HEALTHEAST (OUTPATIENT)
Dept: INTERNAL MEDICINE | Facility: CLINIC | Age: 66
End: 2021-04-20

## 2021-04-20 ENCOUNTER — AMBULATORY - HEALTHEAST (OUTPATIENT)
Dept: LAB | Facility: CLINIC | Age: 66
End: 2021-04-20

## 2021-04-20 DIAGNOSIS — R30.0 DYSURIA: ICD-10-CM

## 2021-04-20 LAB
ALBUMIN UR-MCNC: NEGATIVE G/DL
APPEARANCE UR: CLEAR
BILIRUB UR QL STRIP: NEGATIVE
COLOR UR AUTO: YELLOW
GLUCOSE UR STRIP-MCNC: NEGATIVE MG/DL
HGB UR QL STRIP: NEGATIVE
KETONES UR STRIP-MCNC: NEGATIVE MG/DL
LEUKOCYTE ESTERASE UR QL STRIP: NEGATIVE
NITRATE UR QL: NEGATIVE
PH UR STRIP: 8.5 [PH] (ref 5–8)
SP GR UR STRIP: 1.02 (ref 1–1.03)
UROBILINOGEN UR STRIP-ACNC: ABNORMAL

## 2021-05-11 ENCOUNTER — RECORDS - HEALTHEAST (OUTPATIENT)
Dept: ADMINISTRATIVE | Facility: OTHER | Age: 66
End: 2021-05-11

## 2021-05-25 ENCOUNTER — OFFICE VISIT - HEALTHEAST (OUTPATIENT)
Dept: INTERNAL MEDICINE | Facility: CLINIC | Age: 66
End: 2021-05-25

## 2021-05-25 ENCOUNTER — RECORDS - HEALTHEAST (OUTPATIENT)
Dept: ADMINISTRATIVE | Facility: CLINIC | Age: 66
End: 2021-05-25

## 2021-05-25 DIAGNOSIS — I44.7 LEFT BUNDLE BRANCH BLOCK (LBBB) ON ELECTROCARDIOGRAM: ICD-10-CM

## 2021-05-25 DIAGNOSIS — E03.9 ACQUIRED HYPOTHYROIDISM: ICD-10-CM

## 2021-05-25 DIAGNOSIS — I10 ESSENTIAL HYPERTENSION: ICD-10-CM

## 2021-05-25 DIAGNOSIS — Z00.00 ENCOUNTER FOR PREVENTIVE CARE: ICD-10-CM

## 2021-05-25 DIAGNOSIS — E78.00 HYPERCHOLESTEROLEMIA: ICD-10-CM

## 2021-05-25 DIAGNOSIS — M81.0 AGE-RELATED OSTEOPOROSIS WITHOUT CURRENT PATHOLOGICAL FRACTURE: ICD-10-CM

## 2021-05-25 DIAGNOSIS — F34.1 DYSTHYMIC DISORDER: ICD-10-CM

## 2021-05-25 ASSESSMENT — PATIENT HEALTH QUESTIONNAIRE - PHQ9: SUM OF ALL RESPONSES TO PHQ QUESTIONS 1-9: 3

## 2021-05-25 ASSESSMENT — MIFFLIN-ST. JEOR: SCORE: 1252.62

## 2021-05-26 ENCOUNTER — RECORDS - HEALTHEAST (OUTPATIENT)
Dept: ADMINISTRATIVE | Facility: OTHER | Age: 66
End: 2021-05-26

## 2021-05-26 VITALS — DIASTOLIC BLOOD PRESSURE: 76 MMHG | SYSTOLIC BLOOD PRESSURE: 120 MMHG

## 2021-05-26 ASSESSMENT — PATIENT HEALTH QUESTIONNAIRE - PHQ9
SUM OF ALL RESPONSES TO PHQ QUESTIONS 1-9: 2
SUM OF ALL RESPONSES TO PHQ QUESTIONS 1-9: 6

## 2021-05-27 VITALS — SYSTOLIC BLOOD PRESSURE: 110 MMHG | DIASTOLIC BLOOD PRESSURE: 74 MMHG

## 2021-05-27 ASSESSMENT — PATIENT HEALTH QUESTIONNAIRE - PHQ9: SUM OF ALL RESPONSES TO PHQ QUESTIONS 1-9: 1

## 2021-05-27 NOTE — TELEPHONE ENCOUNTER
Refill Approved    Rx renewed per Medication Renewal Policy. Medication was last renewed on 6/26/18.    Lona Bacon, [facility] Triage/Med Refill 4/5/2019     Requested Prescriptions   Pending Prescriptions Disp Refills     hydroCHLOROthiazide (MICROZIDE) 12.5 mg capsule [Pharmacy Med Name: HYDROCHLOROTHIAZIDE 12.5 MG CP] 90 capsule 2     Sig: TAKE ONE CAPSULE BY MOUTH EVERY MORNING    Diuretics/Combination Diuretics Refill Protocol  Passed - 4/5/2019  2:07 AM       Passed - Visit with PCP or prescribing provider visit in past 12 months    Last office visit with prescriber/PCP: 11/20/2018 Heidi Valencia MD OR same dept: 11/20/2018 Heidi Valencia MD OR same specialty: 11/20/2018 Heidi Valencia MD  Last physical: 3/13/2019 Last MTM visit: Visit date not found   Next visit within 3 mo: Visit date not found  Next physical within 3 mo: Visit date not found  Prescriber OR PCP: Heidi Valencia MD  Last diagnosis associated with med order: 1. Essential hypertension  - hydroCHLOROthiazide (MICROZIDE) 12.5 mg capsule [Pharmacy Med Name: HYDROCHLOROTHIAZIDE 12.5 MG CP]; TAKE ONE CAPSULE BY MOUTH EVERY MORNING  Dispense: 90 capsule; Refill: 2    If protocol passes may refill for 12 months if within 3 months of last provider visit (or a total of 15 months).            Passed - Serum Potassium in past 12 months     Lab Results   Component Value Date    Potassium 3.7 03/13/2019            Passed - Serum Sodium in past 12 months     Lab Results   Component Value Date    Sodium 145 03/13/2019            Passed - Blood pressure on file in past 12 months    BP Readings from Last 1 Encounters:   03/13/19 126/82            Passed - Serum Creatinine in past 12 months     Creatinine   Date Value Ref Range Status   03/13/2019 0.87 0.60 - 1.10 mg/dL Final

## 2021-05-27 NOTE — TELEPHONE ENCOUNTER
Refill Approved    Rx renewed per Medication Renewal Policy. Medication was last renewed on 6/4/18. 3/20/18    Lona Bacon, [facility] Triage/Med Refill 4/8/2019     Requested Prescriptions   Pending Prescriptions Disp Refills     levothyroxine (SYNTHROID, LEVOTHROID) 150 MCG tablet [Pharmacy Med Name: LEVOTHYROXINE 150 MCG TABLET] 45 tablet 3     Sig: TAKE 1 TABLET (150 MCG TOTAL) BY MOUTH EVERY OTHER DAY. ALTERNATE WITH  MCG DOSE       Thyroid Hormones Protocol Passed - 4/6/2019  3:07 PM        Passed - Provider visit in past 12 months or next 3 months     Last office visit with prescriber/PCP: 11/20/2018 Heidi Valencia MD OR same dept: 11/20/2018 Heidi Valencia MD OR same specialty: 11/20/2018 Heidi Valencia MD  Last physical: 3/13/2019 Last MTM visit: Visit date not found   Next visit within 3 mo: Visit date not found  Next physical within 3 mo: Visit date not found  Prescriber OR PCP: Heidi Valencia MD  Last diagnosis associated with med order: 1. Hypertension  - atorvastatin (LIPITOR) 40 MG tablet [Pharmacy Med Name: ATORVASTATIN 40 MG TABLET]; TAKE 1 TABLET (40 MG TOTAL) BY MOUTH BEDTIME.  Dispense: 90 tablet; Refill: 2    If protocol passes may refill for 12 months if within 3 months of last provider visit (or a total of 15 months).             Passed - TSH on file in past 12 months for patient age 12 & older     TSH   Date Value Ref Range Status   03/13/2019 1.05 0.30 - 5.00 uIU/mL Final                   atorvastatin (LIPITOR) 40 MG tablet [Pharmacy Med Name: ATORVASTATIN 40 MG TABLET] 90 tablet 2     Sig: TAKE 1 TABLET (40 MG TOTAL) BY MOUTH BEDTIME.       Statins Refill Protocol (Hmg CoA Reductase Inhibitors) Passed - 4/6/2019  3:07 PM        Passed - PCP or prescribing provider visit in past 12 months      Last office visit with prescriber/PCP: 11/20/2018 Heidi Valencia MD OR same dept: 11/20/2018 Heidi Valencia MD OR same specialty: 11/20/2018 Heidi Valencia  MD [place]  Last physical: 3/13/2019 Last MTM visit: Visit date not found   Next visit within 3 mo: Visit date not found  Next physical within 3 mo: Visit date not found  Prescriber OR PCP: Heidi Valencia MD  Last diagnosis associated with med order: 1. Hypertension  - atorvastatin (LIPITOR) 40 MG tablet [Pharmacy Med Name: ATORVASTATIN 40 MG TABLET]; TAKE 1 TABLET (40 MG TOTAL) BY MOUTH BEDTIME.  Dispense: 90 tablet; Refill: 2    If protocol passes may refill for 12 months if within 3 months of last provider visit (or a total of 15 months).

## 2021-05-29 NOTE — TELEPHONE ENCOUNTER
Called pt to set up appt. She said she is on vacation. Someone called her yesterday too.  She will call when she returns.    Sending to Cyndee MEYERS in response to the 5/23/19 email encounter as I am unable to respond to those.

## 2021-05-29 NOTE — TELEPHONE ENCOUNTER
"Prolia Injection Phone Screen      Screening questions have been asked 2-3 days prior to administration visit for Prolia. If any questions are answered with \"Yes,\" this phone encounter were will routed to ordering provider for further evaluation.     1.  When was the last injection?  NEW    2.  Has insurance for this injection been verified?  Yes    3.  Did you experience any new onset achiness or rashes that lasted for over a month with your previous Prolia injection?   No    4.  Do you have a fever over 101?F or a new deep cough that is unusual for you today? No    5.  Have you started any new medications in the last 6 months that you were told could affect your immune system? These may have been prescribed by oncologist, transplant, rheumatology, or dermatology.   No    6.  In the last 6 months have you have gastric bypass or parathyroid surgery?   No    7.  Do you plan dental work requiring drilling into the bone such as implants/extractions or oral surgery in the next 2-3 months?   Yes-- a crown put on and cavity filling okay per pcp    8. Do you have new insurance since the last injection? Yea - See [facility]     Patient informed if symptoms discussed above present prior to their administration appointment, they are to notify clinic immediately.     Cyndee Edmonds              "

## 2021-05-29 NOTE — TELEPHONE ENCOUNTER
Pt is covered 100% with Medicare as Primary and  as secondary insurance.  Left vm to have her call for appt-either CC or my direct #.    Let me know if any concerns or questions.

## 2021-05-30 VITALS — WEIGHT: 177 LBS | BODY MASS INDEX: 30.22 KG/M2 | HEIGHT: 64 IN

## 2021-05-30 NOTE — TELEPHONE ENCOUNTER
Upcoming Appointment Question  When is the appointment: Tomorrow in the [facility] at 8 am  What is your appointment for?: labs for cholesterol   Who is your appointment scheduled with?: [facility]  What is your question/concern?: The patient is wondering if PCP could put an order in to test her kidneys for an infection.  The patient states for a week she has had left lower back pain and frequent urination.  Please call and advise.  Okay to leave a detailed message?: Yes

## 2021-05-30 NOTE — TELEPHONE ENCOUNTER
Cinthya, the preliminary urine does appear consistent with a bladder infection.  Final culture will be back in a day or 2.  If you are highly symptomatic, I will go ahead and order an antibiotic for you.  Also, please make sure to push fluids.  Let me know how you are doing.

## 2021-05-31 NOTE — TELEPHONE ENCOUNTER
Refill Approved    Rx renewed per Medication Renewal Policy. Medication was last renewed on 10/29/18.    Lona Bacon, [facility] [place] [facility] Triage/Med Refill 8/14/2019     Requested Prescriptions   Pending Prescriptions Disp Refills     buPROPion (WELLBUTRIN XL) 150 MG 24 hr tablet [Pharmacy Med Name: BUPROPION HCL  MG TABLET] 270 tablet 2     Sig: TAKE 3 TABLETS BY MOUTH ONCE DAILY       Tricyclics/Misc Antidepressant/Antianxiety Meds Refill Protocol Passed - 8/14/2019  8:03 AM        Passed - PCP or prescribing provider visit in last year     Last office visit with prescriber/PCP: Visit date not found OR same dept: 11/20/2018 Heidi Valencia MD OR same specialty: 11/20/2018 Heidi Valencia MD  Last physical: Visit date not found Last MTM visit: Visit date not found   Next visit within 3 mo: Visit date not found  Next physical within 3 mo: Visit date not found  Prescriber OR PCP: Bernard Connell MD  Last diagnosis associated with med order: 1. Dysthymic disorder  - buPROPion (WELLBUTRIN XL) 150 MG 24 hr tablet [Pharmacy Med Name: BUPROPION HCL  MG TABLET]; TAKE 3 TABLETS BY MOUTH ONCE DAILY  Dispense: 270 tablet; Refill: 2    If protocol passes may refill for 12 months if within 3 months of last provider visit (or a total of 15 months).

## 2021-06-01 VITALS — HEIGHT: 64 IN | BODY MASS INDEX: 30.73 KG/M2 | WEIGHT: 180 LBS

## 2021-06-01 VITALS — HEIGHT: 63 IN | BODY MASS INDEX: 32.23 KG/M2 | WEIGHT: 181.9 LBS

## 2021-06-01 NOTE — PROGRESS NOTES
[facility] Follow Up Note    Assessment/Plan:  1. Hypercholesterolemia  She has lost 13 pounds since last visit.  She has embraced a more physical lifestyle.  She remains on lipid-lowering medication.  Recommendation: We will recheck labs today.  Encourage further weight loss  - Comprehensive Metabolic Panel  - Lipid Cascade FASTING    2. Acquired hypothyroidism  We will update labs  - Thyroid Stimulating Hormone (TSH)    3. Hypertension  With less than optimal control.  Recommendation: She is still eating a salt rich diet.  Recommend sodium reduction and 5-10 additional pounds of weight loss.  New with amlodipine and hydrochlorothiazide.  No change in meds yet.  Continue regular exercise.  Follow-up in 4 to 6 months    4.  Osteoporosis on Prolia  Tolerating well.  Next shot in December        Heidi Valencia MD    Chief Complaint:  Chief Complaint   Patient presents with     Follow-up       History of Present Illness:  Cinthya is a 63 y.o. female who is here today for follow-up with regard to her usual medical problems.  Of note, since last visit, her dear friend Heidi Posada has passed away.  She continues to mourn her loss.    Otherwise, she is working on her own personal health.  She has embraced a very physical form of activity and lifestyle.  She enjoys biking, hiking and walking.  She has lost about 13 pounds since last visit.  She does report that home blood pressure reports are running in the 130s to 140s.  She will have an occasional elevated blood pressure at 150.  She does eat a salt rich diet.  She does most cooking at home.  She believes that she can do much better.    She denies any chest pain or shortness of breath.  Bowel and bladder function are good.    Review of Systems:  A comprehensive review of systems was performed and was otherwise negative    PFSH:  Social History: She is .  Her dear friend Lissa Posada passed away.  Another friend passed away as well.  She went to  a cancer survival workshop in [place].  She was able to hike up amount and feels quite proud.  She is expecting a new grandchild in November  Social History     Tobacco Use   Smoking Status Former Smoker   Smokeless Tobacco Never Used   Tobacco Comment    Smoked in 1975 for 1 year       Past History: No past medical history on file.    Current Outpatient Medications   Medication Sig Dispense Refill     amLODIPine (NORVASC) 5 MG tablet Take 1 tablet (5 mg total) by mouth daily. 30 tablet 11     aspirin 81 MG EC tablet Take 81 mg by mouth daily.       atorvastatin (LIPITOR) 40 MG tablet TAKE 1 TABLET (40 MG TOTAL) BY MOUTH BEDTIME. 90 tablet 3     buPROPion (WELLBUTRIN XL) 150 MG 24 hr tablet TAKE 3 TABLETS BY MOUTH ONCE DAILY 270 tablet 1     busPIRone (BUSPAR) 15 MG tablet Take 1 tablet (15 mg total) by mouth 2 (two) times a day. 180 tablet 3     calcium citrate (CALCITRATE) 200 mg (950 mg) tablet Take 1 tablet by mouth daily.       cholecalciferol, vitamin D3, 1,000 unit tablet Take 5,000 Units by mouth daily.        hydroCHLOROthiazide (MICROZIDE) 12.5 mg capsule TAKE ONE CAPSULE BY MOUTH EVERY MORNING 90 capsule 3     levothyroxine (SYNTHROID, LEVOTHROID) 150 MCG tablet TAKE 1 TABLET (150 MCG TOTAL) BY MOUTH EVERY OTHER DAY. ALTERNATE WITH  MCG DOSE 45 tablet 3     levothyroxine (SYNTHROID, LEVOTHROID) 175 MCG tablet Take 1 tablet (175 mcg total) by mouth every other day. Alternate with 150 mcg dose 45 tablet 3     Current Facility-Administered Medications   Medication Dose Route Frequency Provider Last Rate Last Dose     denosumab 60 mg (PROLIA 60 mg/ml)  60 mg Subcutaneous Q6 Months Yara Mccrary, PharmD   60 mg at 06/24/19 1008       Family History: Nothing new    Physical Exam:  General Appearance:   Appears well and in no acute distress  Vitals:    09/24/19 0907   BP: 134/82   Patient Site: Left Arm   Patient Position: Sitting   Cuff Size: Adult Large   Pulse: 78   SpO2: 98%   Weight: 177 lb 4.8  "oz (80.4 kg)   Height: 5' 3.5\" (1.613 m)     Wt Readings from Last 3 Encounters:   09/24/19 177 lb 4.8 oz (80.4 kg)   03/13/19 190 lb 7 oz (86.4 kg)   11/20/18 187 lb (84.8 kg)     Body mass index is 30.91 kg/m .        "

## 2021-06-01 NOTE — TELEPHONE ENCOUNTER
please contact Cinthya and let her know that her thyroid functions are running a bit on the high side.  Therefore, I would like her to take her levothyroxine-150 mcg dose daily.  I will change the prescription.  She can set her other prescription aside for now.  We should recheck this in about 2 months.  She may require a lower dose of medication now that her body weight is down some.

## 2021-06-01 NOTE — TELEPHONE ENCOUNTER
Patient Returning Call  Reason for call:  Patient calling back  Information relayed to patient:  Relayed below message from the provider  Patient has additional questions:  No  If YES, what are your questions/concerns:  none  Okay to leave a detailed message?: No call back needed

## 2021-06-02 VITALS — HEIGHT: 64 IN | BODY MASS INDEX: 32.51 KG/M2 | WEIGHT: 190.44 LBS

## 2021-06-02 VITALS — WEIGHT: 187 LBS | BODY MASS INDEX: 32.1 KG/M2

## 2021-06-02 NOTE — PROGRESS NOTES
[facility] Follow Up Note    Assessment/Plan:  1. Episodic lightheadedness/vertigo/mild orthostasis  Recommendations: We will do Antivert at bedtime and half tab as needed.  Additionally, encouraged increasing fluids to 64 ounces per day.  Will make blood pressure medication adjustments in the event that these are contributing factor.  See below.  - meclizine (ANTIVERT) 25 mg tablet; One tab hs.  Half tab two times a day during daytime hours prn  Dispense: 30 tablet; Refill: 1    2. Gastroesophageal reflux disease with esophagitis  We will continue to monitor and address at next visit    3. Hypertension with mild orthostasis  We will add lisinopril and discontinue hydrochlorothiazide in the event that this is contributing to the lightheadedness.  Patient will continue to track pressures  - lisinopril (PRINIVIL,ZESTRIL) 5 MG tablet; Take 1 tablet (5 mg total) by mouth daily.  Dispense: 30 tablet; Refill: 11    4.  Thyroid medication adjustment  She will be due for TSH at next visit.    Follow-up in 3 weeks for labs, symptom follow-up and blood pressure check.      Heidi Valencia MD    Chief Complaint:  Chief Complaint   Patient presents with     Dizziness       History of Present Illness:  Cinthya is a 63 y.o. female who is here today for evaluation of a 10-day history of lightheadedness.  She states she has symptoms with position changes.  She notices in particular that it can be worse at night.  She describes a sense of uneasiness without queasiness.  She denies any vision dimming or presyncopal symptoms.  She denies any exertional chest pain.  She does report that she feels as though she may be getting a cold.  She has been checking blood pressures.  Although they have not been optimal, they have not been high or low.  She has had some dietary indiscretions with foods like all of this, pizza and wine.    Additionally, she denies any shortness of breath, lower extremity edema.  She wonders if her  symptoms could be related to her recent thyroid med change for low TSH.    Review of Systems:  A comprehensive review of systems was performed and was otherwise negative    PFSH:  Social History: She is  with adult children  Social History     Tobacco Use   Smoking Status Former Smoker   Smokeless Tobacco Never Used   Tobacco Comment    Smoked in 1975 for 1 year       Past History:   Current Outpatient Medications   Medication Sig Dispense Refill     amLODIPine (NORVASC) 5 MG tablet Take 1 tablet (5 mg total) by mouth daily. 30 tablet 11     aspirin 81 MG EC tablet Take 81 mg by mouth daily.       atorvastatin (LIPITOR) 40 MG tablet TAKE 1 TABLET (40 MG TOTAL) BY MOUTH BEDTIME. 90 tablet 3     buPROPion (WELLBUTRIN XL) 150 MG 24 hr tablet TAKE 3 TABLETS BY MOUTH ONCE DAILY 270 tablet 1     busPIRone (BUSPAR) 15 MG tablet Take 1 tablet (15 mg total) by mouth 2 (two) times a day. 180 tablet 3     calcium citrate (CALCITRATE) 200 mg (950 mg) tablet Take 1 tablet by mouth daily.       cholecalciferol, vitamin D3, 1,000 unit tablet Take 5,000 Units by mouth daily.        levothyroxine (SYNTHROID, LEVOTHROID) 150 MCG tablet Take 1 tablet (150 mcg total) by mouth daily. 90 tablet 3     melatonin (MELATIN ORAL) Take by mouth at bedtime.       lisinopril (PRINIVIL,ZESTRIL) 5 MG tablet Take 1 tablet (5 mg total) by mouth daily. 30 tablet 11     meclizine (ANTIVERT) 25 mg tablet One tab hs.  Half tab two times a day during daytime hours prn 30 tablet 1     Current Facility-Administered Medications   Medication Dose Route Frequency Provider Last Rate Last Dose     denosumab 60 mg (PROLIA 60 mg/ml)  60 mg Subcutaneous Q6 Months Yara Mccrary, PharmD   60 mg at 06/24/19 1008       Family History:     Physical Exam:  General Appearance:   She appears at baseline and in no acute distress/orthostatic vitals are performed.  Blood pressure 146/82 with heart rate 78-lying  Blood pressure 130/86 with heart rate 72  sitting  Blood pressure 134/90 with heart rate 80 standing  Vitals:    10/16/19 0756   SpO2: 99%   Weight: 176 lb 9.6 oz (80.1 kg)     Wt Readings from Last 3 Encounters:   10/16/19 176 lb 9.6 oz (80.1 kg)   09/24/19 177 lb 4.8 oz (80.4 kg)   03/13/19 190 lb 7 oz (86.4 kg)     Body mass index is 30.79 kg/m .    Extraocular movements are intact and without nystagmus.  Erica-Hallpike maneuvers are performed and symptoms are reproduced  Lungs are clear to auscultation and percussion  Cardiac exam reveals a regular rate and rhythm with no murmurs or gallops

## 2021-06-02 NOTE — PROGRESS NOTES
[facility] Follow Up Note    Assessment/Plan:  1. Hypertension  Under improved control on lisinopril.  We discontinued amlodipine and hydrochlorothiazide.  She is following a low-salt diet.  Home blood pressures are acceptable.  Recommendation: Continue current plan  - Basic Metabolic Panel    2. Acquired hypothyroidism  Upper thyroidism noted in September at her physical.  Of note, thyroid med adjustment was made.  Recommendation: Update labs  - Thyroid [place]      Follow-up in 6 months    Heidi Valencia MD    Chief Complaint:  Chief Complaint   Patient presents with     Follow-up     BP       History of Present Illness:  Cinthya is a 63 y.o. female who is here today for follow-up with regard to hypertension.  Of note, we have made some blood pressure medication adjustments due to her symptoms of lightheadedness.  Her hydrochlorothiazide was discontinued.  We started her on lisinopril in lieu of the diuretic.  Since that time, she has not been taking amlodipine or hydrochlorothiazide.  Her home blood pressures are running nicely in the low 100s to 120.  Her lightheadedness is dissipated.  She is working hard on following her healthy diet as well as sodium reduction.  She denies any chest pain or shortness of breath.    Additionally, after last visit, a thyroid medication adjustment was made.  She notes no changes in energy level, etc.  It is a bit too early for labs today, however, she will not be back for a few months.    She is having her cancer check at the [facility] in November.    Review of Systems:  A comprehensive review of systems was performed and was otherwise negative    PFSH:  Social History:  with adult children  Social History     Tobacco Use   Smoking Status Former Smoker   Smokeless Tobacco Never Used   Tobacco Comment    Smoked in 1975 for 1 year       Past History: No past medical history on file.    Current Outpatient Medications   Medication Sig Dispense  Refill     aspirin 81 MG EC tablet Take 81 mg by mouth daily.       atorvastatin (LIPITOR) 40 MG tablet TAKE 1 TABLET (40 MG TOTAL) BY MOUTH BEDTIME. 90 tablet 3     buPROPion (WELLBUTRIN XL) 150 MG 24 hr tablet TAKE 3 TABLETS BY MOUTH ONCE DAILY 270 tablet 1     busPIRone (BUSPAR) 15 MG tablet Take 1 tablet (15 mg total) by mouth 2 (two) times a day. 180 tablet 3     calcium citrate (CALCITRATE) 200 mg (950 mg) tablet Take 1 tablet by mouth daily.       cholecalciferol, vitamin D3, 1,000 unit tablet Take 5,000 Units by mouth daily.        levothyroxine (SYNTHROID, LEVOTHROID) 150 MCG tablet Take 1 tablet (150 mcg total) by mouth daily. 90 tablet 3     lisinopril (PRINIVIL,ZESTRIL) 5 MG tablet Take 1 tablet (5 mg total) by mouth daily. 30 tablet 11     meclizine (ANTIVERT) 25 mg tablet One tab hs.  Half tab two times a day during daytime hours prn 30 tablet 1     melatonin (MELATIN ORAL) Take by mouth at bedtime.       Current Facility-Administered Medications   Medication Dose Route Frequency Provider Last Rate Last Dose     denosumab 60 mg (PROLIA 60 mg/ml)  60 mg Subcutaneous Q6 Months Yara Mccrary, PharmD   60 mg at 06/24/19 1008       Family History: Nothing new    Physical Exam:  General Appearance:   Appears quite well and in no acute distress  Vitals:    11/01/19 0726   BP: 114/72   Patient Site: Left Arm   Patient Position: Sitting   Cuff Size: Adult Regular   Pulse: 88   SpO2: 99%   Weight: 175 lb 9.6 oz (79.7 kg)     Wt Readings from Last 3 Encounters:   11/01/19 175 lb 9.6 oz (79.7 kg)   10/16/19 176 lb 9.6 oz (80.1 kg)   09/24/19 177 lb 4.8 oz (80.4 kg)     Body mass index is 30.62 kg/m .

## 2021-06-03 VITALS
BODY MASS INDEX: 30.27 KG/M2 | HEART RATE: 78 BPM | DIASTOLIC BLOOD PRESSURE: 82 MMHG | HEIGHT: 64 IN | SYSTOLIC BLOOD PRESSURE: 134 MMHG | WEIGHT: 177.3 LBS | OXYGEN SATURATION: 98 %

## 2021-06-03 VITALS — OXYGEN SATURATION: 99 % | BODY MASS INDEX: 30.79 KG/M2 | WEIGHT: 176.6 LBS

## 2021-06-03 VITALS
OXYGEN SATURATION: 99 % | WEIGHT: 175.6 LBS | BODY MASS INDEX: 30.62 KG/M2 | SYSTOLIC BLOOD PRESSURE: 114 MMHG | HEART RATE: 88 BPM | DIASTOLIC BLOOD PRESSURE: 72 MMHG

## 2021-06-03 NOTE — TELEPHONE ENCOUNTER
Refill Approved    Rx renewed per Medication Renewal Policy. Medication was last renewed on 11/2/18.    Promise Niño, [facility] Triage/Med Refill 11/12/2019     Requested Prescriptions   Pending Prescriptions Disp Refills     busPIRone (BUSPAR) 15 MG tablet [Pharmacy Med Name: BUSPIRONE HCL 15 MG TABLET] 180 tablet 2     Sig: TAKE 1 TABLET BY MOUTH TWICE A DAY       Tricyclics/Misc Antidepressant/Antianxiety Meds Refill Protocol Passed - 11/12/2019  1:57 AM        Passed - PCP or prescribing provider visit in last year     Last office visit with prescriber/PCP: 11/1/2019 Heidi Valencia MD OR same dept: 11/1/2019 Heidi Valencia MD OR same specialty: 11/1/2019 Heidi Valencia MD  Last physical: 3/13/2019 Last MTM visit: Visit date not found   Next visit within 3 mo: Visit date not found  Next physical within 3 mo: Visit date not found  Prescriber OR PCP: Heidi Valencia MD  Last diagnosis associated with med order: There are no diagnoses linked to this encounter.  If protocol passes may refill for 12 months if within 3 months of last provider visit (or a total of 15 months).

## 2021-06-04 VITALS
HEIGHT: 64 IN | DIASTOLIC BLOOD PRESSURE: 76 MMHG | BODY MASS INDEX: 30.4 KG/M2 | OXYGEN SATURATION: 99 % | WEIGHT: 178.06 LBS | HEART RATE: 76 BPM | SYSTOLIC BLOOD PRESSURE: 124 MMHG

## 2021-06-04 NOTE — PROGRESS NOTES
The following steps were completed to comply with the REMS program for Prolia:  1. Ordering provider has previously reviewed information in the Medication Guide and Patient Counseling Chart, including the serious risks of Prolia  and the symptoms of each risk and have been advised to seek prompt medical attention if they have signs or symptoms of any of the serious risks.  2. Provided each patient a copy of the Medication Guide and Patient Brochure.  See MAR for administration details.   Indication: Prolia  (denosumab) is a prescription medicine used to treat osteoporosis in patients who:   Are at high risk for fracture, meaning patients who have had a fracture related to osteoporosis, or who have multiple risk factors for fracture; Cannot use another osteoporosis medicine or other osteoporosis medicines did not work well.   The timeline for early/late injections would be 4 weeks early and any time after the 6 month eduardo. If a patient receives their injection late, then the subsequent injection would be 6 months from the date that they actually received the injection    Have the screening questions been asked prior to this administration? Yes

## 2021-06-04 NOTE — PROGRESS NOTES
I met with Cinthya Waite at the request of herself to recheck her blood pressure.  Blood pressure medications on the MAR were reviewed with patient.    Patient has taken all medications as per usual regimen: Yes  Patient reports tolerating them without any issues or concerns: Yes    Vitals:    12/27/19 1012   BP: 120/76   Patient Site: Left Arm   Patient Position: Sitting   Cuff Size: Adult Regular       Blood pressure was taken, previous encounter was reviewed, recorded blood pressure below 140/90.  Patient was discharged and the note will be sent to the provider for final review.

## 2021-06-04 NOTE — TELEPHONE ENCOUNTER
"Prolia Injection Phone Screen      Screening questions have been asked 2-3 days prior to administration visit for Prolia. If any questions are answered with \"Yes,\" this phone encounter were will routed to ordering provider for further evaluation.     1.  When was the last injection?  6/24/19    2.  Has insurance for this injection been verified?  Yes    3.  Did you experience any new onset achiness or rashes that lasted for over a month with your previous Prolia injection?   No    4.  Do you have a fever over 101?F or a new deep cough that is unusual for you today? No    5.  Have you started any new medications in the last 6 months that you were told could affect your immune system? These may have been prescribed by oncologist, transplant, rheumatology, or dermatology.   No    6.  In the last 6 months have you have gastric bypass or parathyroid surgery?   No    7.  Do you plan dental work requiring drilling into the bone such as implants/extractions or oral surgery in the next 2-3 months?   No    8. Do you have new insurance since the last injection? NO    Patient informed if symptoms discussed above present prior to their administration appointment, they are to notify clinic immediately.     Cyndee Edmonds            "

## 2021-06-04 NOTE — PROGRESS NOTES
[facility] Follow Up Note    Assessment/Plan:  1. Hypertension  Stable on current medications.  Slight tickle/cough.  Question secondary to ACE inhibitor.  Recommendation: Have offered a med change.  However, patient would like to stay on a bit longer.  Therefore, we will continue current medications with a recheck in 6 months    2.  History of breast cancer  Stable.  Seeing oncologist annually.  Last mammogram within normal limits    3. Acquired hypothyroidism  We will be updating labs today        Heidi Valencia MD    Chief Complaint:  Chief Complaint   Patient presents with     Follow-up       History of Present Illness:  Cinthya is a 64 y.o. female who is here today for follow-up with regard to blood pressure.  She was recently placed on lisinopril and has had excellent control of her blood pressure.  She does note an irritative cough that occurs on occasion.  She denies any postnasal drip.  Of note, she is aware of the potential side effect of the ACE inhibitor.  However, she would like to remain on her current medication.    She continues to work with weight loss and exercise.    With regard to breast cancer, she sees her oncologist once per year.  She is in a survivorship program.  She has had no episodes of recurrence.  Her other medical problems are stable.    Review of Systems:  A comprehensive review of systems was performed and was otherwise negative    PFSH:  Social History: He has a new grandchild on the way  Social History     Tobacco Use   Smoking Status Former Smoker   Smokeless Tobacco Never Used   Tobacco Comment    Smoked in 1975 for 1 year       Past History:   Current Outpatient Medications   Medication Sig Dispense Refill     aspirin 81 MG EC tablet Take 81 mg by mouth daily.       atorvastatin (LIPITOR) 40 MG tablet TAKE 1 TABLET (40 MG TOTAL) BY MOUTH BEDTIME. 90 tablet 3     buPROPion (WELLBUTRIN XL) 150 MG 24 hr tablet TAKE 3 TABLETS BY MOUTH ONCE DAILY 270 tablet 1      "busPIRone (BUSPAR) 15 MG tablet Take 1 tablet (15 mg total) by mouth 2 (two) times a day. 180 tablet 3     calcium citrate (CALCITRATE) 200 mg (950 mg) tablet Take 1 tablet by mouth daily.       cholecalciferol, vitamin D3, 1,000 unit tablet Take 5,000 Units by mouth daily.        levothyroxine (SYNTHROID) 125 MCG tablet Alternate with 150 mcg dose 30 tablet 6     levothyroxine (SYNTHROID, LEVOTHROID) 150 MCG tablet Take every other day and alternate with 125 dose 90 tablet 3     lisinopril (PRINIVIL,ZESTRIL) 5 MG tablet Take 1 tablet (5 mg total) by mouth daily. 30 tablet 11     melatonin (MELATIN ORAL) Take by mouth at bedtime.       Current Facility-Administered Medications   Medication Dose Route Frequency Provider Last Rate Last Dose     denosumab 60 mg (PROLIA 60 mg/ml)  60 mg Subcutaneous Once Heidi Valencia MD           Family History: Nothing new    Physical Exam:  General Appearance:   Pleasant, well-appearing and in no acute distress  Vitals:    12/20/19 0754   BP: 124/76   Patient Site: Left Arm   Patient Position: Sitting   Cuff Size: Adult Large   Pulse: 76   SpO2: 99%   Weight: 178 lb 1 oz (80.8 kg)   Height: 5' 3.5\" (1.613 m)     Wt Readings from Last 3 Encounters:   12/20/19 178 lb 1 oz (80.8 kg)   11/01/19 175 lb 9.6 oz (79.7 kg)   10/16/19 176 lb 9.6 oz (80.1 kg)     Body mass index is 31.05 kg/m .    Head neck exam is negative  Lungs are clear to auscultation and percussion  Cardiac exam reveals regular rate and rhythm with no murmurs or gallops      "

## 2021-06-06 NOTE — TELEPHONE ENCOUNTER
Refill Approved    Rx renewed per Medication Renewal Policy. Medication was last renewed on 8/14/2019 for 27    Aura Hamilton, [facility] Triage/Med Refill 2/16/2020     Requested Prescriptions   Pending Prescriptions Disp Refills     buPROPion (WELLBUTRIN XL) 150 MG 24 hr tablet [Pharmacy Med Name: BUPROPION HCL  MG TABLET] 270 tablet 1     Sig: TAKE 3 TABLETS BY MOUTH EVERY DAY       Tricyclics/Misc Antidepressant/Antianxiety Meds Refill Protocol Passed - 2/12/2020  1:30 AM        Passed - PCP or prescribing provider visit in last year     Last office visit with prescriber/PCP: 12/20/2019 Heidi Valencia MD OR same dept: 12/20/2019 Heidi Valencia MD OR same specialty: 12/20/2019 Heidi Valencia MD  Last physical: 3/13/2019 Last MTM visit: Visit date not found   Next visit within 3 mo: Visit date not found  Next physical within 3 mo: Visit date not found  Prescriber OR PCP: Heidi Valencia MD  Last diagnosis associated with med order: 1. Dysthymic disorder  - buPROPion (WELLBUTRIN XL) 150 MG 24 hr tablet [Pharmacy Med Name: BUPROPION HCL  MG TABLET]; TAKE 3 TABLETS BY MOUTH EVERY DAY  Dispense: 270 tablet; Refill: 1    If protocol passes may refill for 12 months if within 3 months of last provider visit (or a total of 15 months).

## 2021-06-07 NOTE — TELEPHONE ENCOUNTER
Refill Approved    Rx renewed per Medication Renewal Policy. Medication was last renewed on 4/8/19.    Lona Bacon, [facility] Triage/Med Refill 4/30/2020     Requested Prescriptions   Pending Prescriptions Disp Refills     atorvastatin (LIPITOR) 40 MG tablet [Pharmacy Med Name: ATORVASTATIN 40 MG TABLET] 90 tablet 3     Sig: TAKE 1 TABLET (40 MG TOTAL) BY MOUTH BEDTIME.       Statins Refill Protocol (Hmg CoA Reductase Inhibitors) Passed - 4/29/2020 12:12 PM        Passed - PCP or prescribing provider visit in past 12 months      Last office visit with prescriber/PCP: 12/20/2019 Heidi Valencia MD OR same dept: 12/20/2019 Heidi Valencia MD OR same specialty: 12/20/2019 Heidi Valencia MD  Last physical: 3/13/2019 Last MTM visit: Visit date not found   Next visit within 3 mo: Visit date not found  Next physical within 3 mo: Visit date not found  Prescriber OR PCP: Heidi Valencia MD  Last diagnosis associated with med order: 1. Hypertension  - atorvastatin (LIPITOR) 40 MG tablet [Pharmacy Med Name: ATORVASTATIN 40 MG TABLET]; TAKE 1 TABLET (40 MG TOTAL) BY MOUTH BEDTIME.  Dispense: 90 tablet; Refill: 3    If protocol passes may refill for 12 months if within 3 months of last provider visit (or a total of 15 months).

## 2021-06-07 NOTE — TELEPHONE ENCOUNTER
----- Message from Heidi Valencia MD sent at 4/28/2020 12:23 PM CDT -----  I went ahead and signed the lab orders and added a lipid as well.  Thanks for faxing this to Meryl.

## 2021-06-08 NOTE — TELEPHONE ENCOUNTER
amLODIPine (NORVASC) 5 MG tablet [[id number]]     Electronically signed by: Heidi Valencia MD on 03/13/19 0750  Status: Discontinued    Ordering user: Heidi Valencia MD 03/13/19 0750  Authorized by: Heidi Valencia MD    Frequency: DAILY 03/13/19 - 11/01/19  Released by: Heidi Valencia MD 03/13/19 0750    Discontinued by: Heidi Valencia MD 11/01/19 0731

## 2021-06-08 NOTE — TELEPHONE ENCOUNTER
RN cannot approve Refill Request    RN can NOT refill this medication historical medication requested.       Lona Bacon, [facility] Triage/Med Refill 5/27/2020    Requested Prescriptions   Pending Prescriptions Disp Refills     amLODIPine (NORVASC) 5 MG tablet [Pharmacy Med Name: AMLODIPINE BESYLATE 5 MG TAB] 90 tablet 3     Sig: TAKE 1 TABLET BY MOUTH EVERY DAY       Calcium-Channel Blockers Protocol Passed - 5/23/2020 10:41 AM        Passed - PCP or prescribing provider visit in past 12 months or next 3 months     Last office visit with prescriber/PCP: 12/20/2019 Heidi Valencia MD OR same dept: 12/20/2019 Heidi Valencia MD OR same specialty: 12/20/2019 Heidi Valencia MD  Last physical: 5/19/2020 Last MTM visit: Visit date not found   Next visit within 3 mo: Visit date not found  Next physical within 3 mo: Visit date not found  Prescriber OR PCP: Heidi Valencia MD  Last diagnosis associated with med order: There are no diagnoses linked to this encounter.  If protocol passes may refill for 12 months if within 3 months of last provider visit (or a total of 15 months).             Passed - Blood pressure filed in past 12 months     BP Readings from Last 1 Encounters:   12/27/19 120/76

## 2021-06-08 NOTE — PROGRESS NOTES
"Cinthya Waite is a 64 y.o. female who is being evaluated via a billable video visit.      The patient has been notified of following:     \"This video visit will be conducted via a call between you and your physician/provider. We have found that certain health care needs can be provided without the need for an in-person physical exam.  This service lets us provide the care you need with a video conversation.  If a prescription is necessary we can send it directly to your pharmacy.  If lab work is needed we can place an order for that and you can then stop by our lab to have the test done at a later time.    Video visits are billed at different rates depending on your insurance coverage. Please reach out to your insurance provider with any questions.    If during the course of the call the physician/provider feels a video visit is not appropriate, you will not be charged for this service.\"    Patient has given verbal consent to a Video visit? Yes    Patient would like to receive their AVS by AVS Preference: Vamsi.    Patient would like the video invitation sent by: Text to cell phone: [phone]    Will anyone else be joining your video visit? No        Video Start Time: 11:22    Additional provider notes:       Video-Visit Details    Type of service:  Video Visit    Video End Time (time video stopped): 11:42 AM  Originating Location (pt. Location): Home    Distant Location (provider location):  [facility]     Platform used for Video Visit: [facility]      Heidi Valencia MD  Assessment and Plan:   Wellness forms reviewed.  No new needs identified    1. Encounter for preventive care  She has scheduled mammography.  Colon cancer screening and bone densitometry are up-to-date.  Dermatology, ophthalmologic and dental appointments are up-to-date.  Lifestyle reviewed.  She is eating quite healthfully.  I have reviewed her PDF file which shows that she has had a significant weight loss over the past 6 " months.  Blood pressures are nicely controlled.  She is exercising regularly.    2. Hypertension  Blood pressures are running between 90 and 110 systolic.  Recommendation: Reduce amlodipine to half tablet daily.  If blood pressures remain under 120 systolic, she will discontinue amlodipine and continue on with lisinopril    3. Malignant neoplasm of left female breast, unspecified estrogen receptor status, unspecified site of breast (H)  She is going to be transitioning to the survivor program through her oncology office.  She is somewhat nervous about this.  Mammogram is scheduled for August  - Hepatic Profile; Future    4. Primary insomnia  Extensive discussion was had today with regard to sleep hygiene.  She is going to decrease technology for approximately 120 minutes /2 hours before bedtime.  She is going to keep her bedroom cool.  She is going to avoid watching the news at the hour of sleep and read books.  She will use melatonin up to 10 mg    5. Acquired hypothyroidism  Labs ordered    6. Age-related osteoporosis without current pathological fracture  Prolia.  Now with low bone density.  We will check vitamin D.  Continue current medications  - Vitamin D, Total (25-Hydroxy); Future    7. Slow transit constipation  Have recommended fluid intake to be increased to 64 ounces daily.  Increase physical activity.  Decrease Norvasc as calcium blockers can cause constipation    The patient's current medical problems were reviewed.      The following health maintenance schedule was reviewed with the patient and provided in printed form in the after visit summary:   Health Maintenance   Topic Date Due     DEPRESSION ACTION PLAN  1955     HEPATITIS C SCREENING  1955     DXA SCAN  11/09/2018     MEDICARE ANNUAL WELLNESS VISIT  03/13/2020     FALL RISK ASSESSMENT  09/24/2020     MAMMOGRAM  05/02/2021     COLORECTAL CANCER SCREENING  03/20/2023     ADVANCE CARE PLANNING  03/13/2024     LIPID  09/24/2024     TD  18+ HE  09/24/2029     PNEUMOCOCCAL IMMUNIZATION 65+ LOW/MEDIUM RISK  Completed     INFLUENZA VACCINE RULE BASED  Completed     ZOSTER VACCINES  Completed        Subjective:   Chief Complaint: Cinthya Waite is an 64 y.o. female here for an Annual Wellness visit.   HPI: Cinthya is a delightful 64-year-old female who is a breast cancer survivor.  She is also status post hysterectomy for abnormal uterine bleeding while on tamoxifen.  She is scheduled for a video wellness visit.  Overall, she states she is doing well.  She has some minor issues of constipation and insomnia to discuss.  She states she believes that her insomnia is caused from some anxiety regarding the current pandemic situation.  She states that she considers herself elderly and immune compromise.  Therefore, her son, who is residing with them, is doing the grocery shopping.  She states she is very cautious about where she goes.  She continues to be aggressively sheltering in place.    She is monitoring blood pressures and weights quite regularly.  She sent in a PDF file that was reviewed.  Her blood pressure is running nicely between 110 and sometimes down to 90 systolic.  Her weight is down to 165 pounds.  She is exercising twice a day.  She is following a regular schedule.    She denies any new symptomatology    She does have some minor constipation.  She is not getting enough fluids.  She has issues with insomnia.  She states that she feels some stress regarding the current COVID situation.  She is using melatonin as needed.    Review of Systems:    Please see above.  The rest of the review of systems are negative for all systems.    Patient Care Team:  She is seen by her oncologist on a regular basis.  Heidi Valencia MD as PCP - General  Heidi Valencia MD as Assigned PCP     Patient Active Problem List   Diagnosis     Dysthymic Disorder     Raynaud's Phenomenon     Hypothyroidism     Hypercholesterolemia     Hypertension      Age-related osteoporosis without current pathological fracture     Breast cancer (H)     History of appendectomy     Left bundle branch block (LBBB) on electrocardiogram     No past medical history on file.   Past Surgical History:   Procedure Laterality Date     APPENDECTOMY       BREAST SURGERY      lumpectomy      SECTION       HYSTERECTOMY       IN APPENDECTOMY      Description: Appendectomy;  Recorded: 10/25/2011;     IN  DELIVERY ONLY      Description:  Section;  Recorded: 10/25/2011;  Comments: x2.      Family History   Problem Relation Age of Onset     Breast cancer Mother      Stroke Mother      Stroke Brother       Social History     Socioeconomic History     Marital status:      Spouse name: Not on file     Number of children: Not on file     Years of education: Not on file     Highest education level: Not on file   Occupational History     Not on file   Social Needs     Financial resource strain: Not on file     Food insecurity     Worry: Not on file     Inability: Not on file     Transportation needs     Medical: Not on file     Non-medical: Not on file   Tobacco Use     Smoking status: Former Smoker     Smokeless tobacco: Never Used     Tobacco comment: Smoked in  for 1 year   Substance and Sexual Activity     Alcohol use: Not on file     Drug use: Not on file     Sexual activity: Not on file   Lifestyle     Physical activity     Days per week: Not on file     Minutes per session: Not on file     Stress: Not on file   Relationships     Social connections     Talks on phone: Not on file     Gets together: Not on file     Attends Congregational service: Not on file     Active member of club or organization: Not on file     Attends meetings of clubs or organizations: Not on file     Relationship status: Not on file     Intimate partner violence     Fear of current or ex partner: Not on file     Emotionally abused: Not on file     Physically abused: Not on file     Forced  sexual activity: Not on file   Other Topics Concern     Not on file   Social History Narrative     Not on file      Current Outpatient Medications   Medication Sig Dispense Refill     aspirin 81 MG EC tablet Take 81 mg by mouth daily.       atorvastatin (LIPITOR) 40 MG tablet TAKE 1 TABLET (40 MG TOTAL) BY MOUTH BEDTIME. 90 tablet 2     buPROPion (WELLBUTRIN XL) 150 MG 24 hr tablet TAKE 3 TABLETS BY MOUTH EVERY  tablet 3     busPIRone (BUSPAR) 15 MG tablet Take 1 tablet (15 mg total) by mouth 2 (two) times a day. 180 tablet 3     calcium citrate (CALCITRATE) 200 mg (950 mg) tablet Take 1 tablet by mouth daily.       cholecalciferol, vitamin D3, 1,000 unit tablet Take 5,000 Units by mouth daily.        levothyroxine (SYNTHROID) 125 MCG tablet Alternate with 150 mcg dose 30 tablet 6     levothyroxine (SYNTHROID, LEVOTHROID) 150 MCG tablet Take every other day and alternate with 125 dose 90 tablet 3     lisinopril (PRINIVIL,ZESTRIL) 5 MG tablet Take 1 tablet (5 mg total) by mouth daily. 30 tablet 11     melatonin (MELATIN ORAL) Take by mouth at bedtime.       amLODIPine (NORVASC) 5 MG tablet Take 5 mg by mouth daily.       No current facility-administered medications for this visit.       Objective:   Vital Signs: There were no vitals taken for this visit.       VisionScreening:  No exam data present     PHYSICAL EXAM  She appears quite well.  Blood pressure is 110/70  Clock is reviewed and is accurate.    Assessment Results 5/19/2020   Activities of Daily Living No help needed   Instrumental Activities of Daily Living No help needed   Mini Cog Total Score -   Some recent data might be hidden     A Mini-Cog score of 0-2 suggests the possibility of dementia, score of 3-5 suggests no dementia    Identified Health Risks:     The patient was provided with written information regarding signs of hearing loss.  The patient was provided with suggestions to help her develop a healthy emotional lifestyle.   Patient's  advanced directive was discussed and I am comfortable with the patient's wishes.

## 2021-06-09 ENCOUNTER — RECORDS - HEALTHEAST (OUTPATIENT)
Dept: HEALTH INFORMATION MANAGEMENT | Facility: CLINIC | Age: 66
End: 2021-06-09

## 2021-06-09 NOTE — PROGRESS NOTES
1. Did you experience any problems with previous Prolia injection? NO  2. Do you feel sick today?(fever, RS, GI,  issues)? NO  3. Any medication changes in the last 6 months? NO  4. Did you take prednisone or other immunosuppressant drugs in the last 6 months?(chemo, transplant, rheu, dermatology conditions)? NO  5. Did you have any serious infection in the last 6 months? (pancreatitis) NO  6. Any recent hospitalizations/ surgeries (especially gastric bypass, thyroid, parathyroid)? NO  7. Do you plan any dental work?(especially implants and extractions) in the next 2-3 months? NO  8. Did you have any fractures in the last year? NO

## 2021-06-09 NOTE — PROGRESS NOTES
Assessment and Plan:    2. 1. Preventative health care  Up-to-date on mammography, colon cancer screening, immunizations, dermatologic, ophthalmologic and dental care.  With regard to lifestyle, she is exercising 4-5 days per week.  He is working on eating a balanced diet  - Gynecologic Cytology (PAP Smear)    2. Acquired hypothyroidism  We'll update labs  - Thyroid Stimulating Hormone (TSH)    3. Essential hypertension with goal blood pressure less than 140/90  At goal    4. Osteoporosis due to aromatase inhibitor  Recent diagnosis of osteoporosis despite being on alendronate for 5 years with Arimidex therapy.  Bone density reviewed and reveals a significant 3.7% loss at the hips.  Recommendation: Have discussed the importance of weightbearing and balance exercises.  Have discussed calcium and vitamin D supplementation.  Will pursue Prolia injections.  Also, a secondary workup for osteoporosis will be complete  - Vitamin D, Total (25-Hydroxy)  - Parathyroid Hormone Intact; Future  - Magnesium  - Phosphorus  - Parathyroid Hormone Intact    5. Medication monitoring encounter  Labs ordered    6. Hypercholesterolemia  She remains on Lipitor.  We'll check labs  - Comprehensive Metabolic Panel  - HM2(CBC w/o Differential)  - Lipid Cascade FASTING    7. Breast cancer  Stage II.  Being followed at the [facility]-oncology.  Reports reviewed            Heidi Valencia MD  3/14/2017    Chief Complaint:  Here for annual preventative care examination    History of Present Illness:  Cinthya is a 61 y.o. female who is here today for a physical examination.  Of note, in general she is doing well.  She continues to follow with the [facility] cancer clinic.  She is seen by a physician annually and a nurse practitioner annually.  Her visits or 6 months apart.  She states she has labs and a breast exam.  All have been stable.  Of note, her history is significant for 5 years and arimidex followed by  "about 2-1/2 years of tamoxifen.  She is currently off therapy.  While on Arava decks, she was on prophylactic alendronate.  Since cessation of all of these medications, she has had a significant decrease in her bone mineral density.  A bone mineral density is reviewed and reveals osteoporosis with the lowest T score at the one third radius.  She is participating in weightbearing and balance exercises with her classes at the [facility].  She is getting about 3-4 servings of dietary calcium per day in addition to 5000 international units of vitamin D.    She otherwise has no major problems.  She reports that she will be retiring from her job soon.  She has an interval significant reduction of responsibility starting in May.  Her final nursing home date will be in the fall.    Review of Systems:   Aside from weight gain, The rest of the review of systems are negative for all systems.    PFSH:  Social History: She has adult children.  She is a nonsmoker  History   Smoking Status     Former Smoker   Smokeless Tobacco     Not on file     Comment: Smoked in 1975 for 1 year       Past Medical History: As noted in the problem list  Allergies   Allergen Reactions     Diclofenac Unknown     Elevation of LFTs.       Family History: Her brother recently suffered from a bout of major depression requiring electroshock therapy  Family History   Problem Relation Age of Onset     Breast cancer Mother      Stroke Mother      Stroke Brother        Physical Exam:  Vitals:    03/14/17 0855   BP: 104/72   Patient Site: Left Arm   Patient Position: Sitting   Cuff Size: Adult Regular   Pulse: 72   Weight: 177 lb (80.3 kg)   Height: 5' 3.75\" (1.619 m)     Wt Readings from Last 3 Encounters:   03/14/17 177 lb (80.3 kg)   03/04/16 170 lb (77.1 kg)   10/14/15 174 lb (78.9 kg)       General Appearance:  Alert, cooperative, no distress, appears stated age   Head:  Normocephalic, without obvious abnormality, atraumatic   Eyes:  PERRL, conjunctiva/corneas " clear, EOM's intact   Ears:  Normal TM's and external ear canals, both ears   Nose: Nares normal, septum midline,mucosa normal, no drainage    Throat: Lips, mucosa, and tongue normal; teeth and gums normal   Neck: Supple, symmetrical, trachea midline, no adenopathy;  thyroid: not enlarged, symmetric, no tenderness/mass/nodules; no carotid bruit or JVD   Back:   Symmetric, no curvature, ROM normal, no CVA tenderness   Lungs:   Clear to auscultation bilaterally, respirations unlabored   Breasts:  No breast masses, tenderness, asymmetry, or nipple discharge.   Heart:  Regular rate and rhythm, S1 and S2 normal, no murmur, rub, or gallop   Abdomen:   Soft, non-tender, bowel sounds active all four quadrants,  no masses, no organomegaly   Genitalia: Normally developed genitalia with no external lesions or eruptions.  Vagina and cervix show no lesions, inflammation, discharge or tenderness.  No cystocele.  Uterus normal size and position.  No adnexal mass or tenderness.   Rectal:  No hemorrhoids noted externally    Extremities: Extremities normal, atraumatic, no cyanosis or edema   Skin: Skin color, texture, turgor normal, no rashes or lesions   Lymph nodes: Cervical, supraclavicular, and axillary nodes normal   Neurologic: Normal, CN 2-12 intact     Medications:  Current Outpatient Prescriptions   Medication Sig Dispense Refill     aspirin 81 MG EC tablet Take 81 mg by mouth daily.       atorvastatin (LIPITOR) 40 MG tablet Take 1 tablet (40 mg total) by mouth bedtime. 90 tablet 3     buPROPion (WELLBUTRIN XL) 150 MG 24 hr tablet Take 3 tablets (450 mg total) by mouth daily. Take 3 tabs 270 tablet 3     busPIRone (BUSPAR) 15 MG tablet Take 1 tablet (15 mg total) by mouth daily. 90 tablet 3     cholecalciferol, vitamin D3, 1,000 unit tablet Take 5,000 Units by mouth daily.        hydroCHLOROthiazide (MICROZIDE) 12.5 mg capsule TAKE ONE CAPSULE BY MOUTH EVERY MORNING 90 capsule 0     levothyroxine (SYNTHROID, LEVOTHROID) 150  MCG tablet TAKE 1 TABLET BY MOUTH ONCE DAILY 90 tablet 0     Current Facility-Administered Medications   Medication Dose Route Frequency Provider Last Rate Last Dose     pneumococcal conj. 13-valent vaccine 0.5 mL (PREVNAR-13)  0.5 mL Intramuscular Once Heidi Valencia MD           Immunizations:  Immunization History   Administered Date(s) Administered     Hep A, historic 12/30/2011, 02/08/2013     Influenza, inj, historic 11/04/2011     Influenza, seasonal,quad inj 6-35 mos 09/19/2012, 10/06/2014     Influenza,seasonal quad, PF 10/09/2013     Influenza,seasonal quad, PF, 36+MOS 10/14/2015     Tdap 01/01/2009     ZOSTER 02/03/2012

## 2021-06-10 ENCOUNTER — COMMUNICATION - HEALTHEAST (OUTPATIENT)
Dept: INTERNAL MEDICINE | Facility: CLINIC | Age: 66
End: 2021-06-10

## 2021-06-10 DIAGNOSIS — E03.9 HYPOTHYROIDISM, UNSPECIFIED TYPE: ICD-10-CM

## 2021-06-10 DIAGNOSIS — E03.9 ACQUIRED HYPOTHYROIDISM: ICD-10-CM

## 2021-06-12 NOTE — PROGRESS NOTES
Patient would like the video invitation sent by: Text to cell phone: [phone]       ASSESSMENT:    1. Hypertension  Home blood pressures are reviewed.  My chart message reviewed.  Some lability noted but overall, most blood pressures are less than 120.  Recommendations: Keep current medications the same.  Will check a BMP as she is on an ACE inhibitor in December.  - Basic Metabolic Panel; Future    2. Malignant neoplasm of left female breast, unspecified estrogen receptor status, unspecified site of breast (H)  Followed by oncology.  Currently in remission.  Doing well.    3. Hypercholesterolemia  Stable on current medications    4. Age-related osteoporosis without current pathological fracture  On Prolia.  She is going to be transitioning her bone density to the [place] [facility].  Due March 2021.  This will be the first scan in our system.  - DXA Bone Density Scan; Future    5. Acquired hypothyroidism  Last TSH was 4.0.  We will update labs in December  - Thyroid Stimulating Hormone (TSH); Future    6. Primary insomnia  Recommend extended release melatonin/stay away from technology in computers.  Recommend black-and-white [place].  Keep bedroom cool    7. Dysthymic disorder  Continue medications as you are doing.  Continue regular exercise program  - busPIRone (BUSPAR) 10 MG tablet; Take 1 tablet (10 mg total) by mouth 2 (two) times a day.  Dispense: 180 tablet; Refill: 3        Preventive Health Care:      PLAN:  There are no Patient Instructions on file for this visit.  No orders of the defined types were placed in this encounter.    No follow-ups on file.      CHIEF COMPLAINT:  Chief Complaint   Patient presents with     Follow-up     blood pressure       HISTORY OF PRESENT ILLNESS:  Cinthya is a 64 y.o. female contacting the clinic today via video for follow-up with regard to blood pressure.  Of note, in general, she has been doing well.  She has adapted a very healthy lifestyle over the past several months.   She is eating very clean foods and exercising vigorously.  She has lost greater than 20 pounds over the past year.  She is feeling good.  She did report some blood pressures that are reviewed from the MyChart record.  She has some lability noted.  She wonders if it is secondary to an increase in her buspirone for anxiety.  Additionally, she does report that she is having some difficulty with insomnia.  Although she is able to initiate sleep with the melatonin, she wakes up frequently.  She states it does not impact her overall wellbeing as, with the pandemic, her schedule is flexible.    She denies any chest pain or shortness of breath.    She is on Prolia for osteoporosis.  Her last bone density suggest that she has made significant improvements and is in a low bone mass range.  She would like to transition her bone density from [place] to our system.  We will make arrangements for this.    Laboratory studies from June are reviewed with her.    REVIEW OF SYSTEMS:     All other systems are negative.    PFSH:  Social History     Social History Narrative     Not on file         TOBACCO USE:  Social History     Tobacco Use   Smoking Status Former Smoker   Smokeless Tobacco Never Used   Tobacco Comment    Smoked in 1975 for 1 year       VITALS:  There were no vitals filed for this visit.  Wt Readings from Last 3 Encounters:   12/20/19 178 lb 1 oz (80.8 kg)   11/01/19 175 lb 9.6 oz (79.7 kg)   10/16/19 176 lb 9.6 oz (80.1 kg)       PHYSICAL EXAM:  (observations via Video)  He appears quite well and in no acute distress.  Her affect is normal and bright      ADDITIONAL HISTORY SUMMARIZED (2): Viewed the my chart notes  CARE EVERYWHERE/ EXTRA INFORMATION (1):   RADIOLOGY TESTS (1): Reviewed bone density from March thousand 19-in care everywhere  LABS (1): Reviewed labs from June  CARDIOLOGY/MEDICINE TESTS (1):   INDEPENDENT REVIEW (2 each):     Total data points:     Video Start time: 1:49 PM  Video End time: 2:05 PM    The  "visit lasted a total of 16 minutes     CA Intake Time: 8    I have reviewed and updated the patient's Past Medical History, Social History, Family History as appropriate.    MEDICATIONS: Reviewed and updated per CA and MD  Current Outpatient Medications   Medication Sig Dispense Refill     amLODIPine (NORVASC) 5 MG tablet TAKE 1 TABLET BY MOUTH EVERY DAY 90 tablet 3     aspirin 81 MG EC tablet Take 81 mg by mouth daily.       atorvastatin (LIPITOR) 40 MG tablet TAKE 1 TABLET (40 MG TOTAL) BY MOUTH BEDTIME. 90 tablet 2     buPROPion (WELLBUTRIN XL) 150 MG 24 hr tablet TAKE 3 TABLETS BY MOUTH EVERY  tablet 3     busPIRone (BUSPAR) 15 MG tablet Take 1 tablet (15 mg total) by mouth 2 (two) times a day. 180 tablet 3     calcium citrate (CALCITRATE) 200 mg (950 mg) tablet Take 1 tablet by mouth daily.       cholecalciferol, vitamin D3, 1,000 unit tablet Take 5,000 Units by mouth daily.        levothyroxine (SYNTHROID) 125 MCG tablet Alternate with 150 mcg dose 30 tablet 6     levothyroxine (SYNTHROID, LEVOTHROID) 150 MCG tablet Take every other day and alternate with 125 dose 90 tablet 3     lisinopril (PRINIVIL,ZESTRIL) 5 MG tablet Take 1 tablet (5 mg total) by mouth daily. 30 tablet 11     MAGNESIUM CARBONATE ORAL Take 250 mg by mouth.       melatonin (MELATIN ORAL) Take by mouth at bedtime.       Current Facility-Administered Medications   Medication Dose Route Frequency Provider Last Rate Last Dose     denosumab 60 mg (PROLIA 60 mg/ml)  60 mg Subcutaneous Q6 Months Heidi Valencia MD   60 mg at 06/29/20 0821         The patient has been notified of following:     \"This video visit will be conducted via a call between you and your physician/provider. We have found that certain health care needs can be provided without the need for an in-person physical exam.  This service lets us provide the care you need with a video conversation.  If a prescription is necessary we can send it directly to your pharmacy.  If " "lab work is needed we can place an order for that and you can then stop by our lab to have the test done at a later time.    Video visits are billed at different rates depending on your insurance coverage. Please reach out to your insurance provider with any questions.    If during the course of the call the physician/provider feels a video visit is not appropriate, you will not be charged for this service.\"    Patient has given verbal consent to a Video visit? Yes  How would you like to obtain your AVS? AVS Preference: GetBackhart.  If dropped by the video visit, the video invitation should be sent to: Text to cell phone: same as above  Will anyone else be joining your video visit? No     Video-Visit Details    Type of service:  Video Visit    Originating Location (pt. Location): Home    Distant Location (provider location):  [facility] INTERNAL MEDICINE     Platform used for Video Visit: Amado Mcgowan CMA      "

## 2021-06-13 NOTE — TELEPHONE ENCOUNTER
Refill Approved    Rx renewed per Medication Renewal Policy. Medication was last renewed on 11/1/19.    Lona Bacon, [facility] Triage/Med Refill 11/10/2020     Requested Prescriptions   Pending Prescriptions Disp Refills     levothyroxine (SYNTHROID, LEVOTHROID) 125 MCG tablet [Pharmacy Med Name: LEVOTHYROXINE 125 MCG TABLET] 45 tablet 4     Sig: TAKE 1 TABLET BY MOUTH EVERY OTHER DAY, ALTERNATIVE WITH 150MCG       Thyroid Hormones Protocol Passed - 11/8/2020  4:34 PM        Passed - Provider visit in past 12 months or next 3 months     Last office visit with prescriber/PCP: 12/20/2019 Heidi Valencia MD OR same dept: 12/20/2019 Heidi Valencia MD OR same specialty: 12/20/2019 Heidi Valencia MD  Last physical: 5/19/2020 Last MTM visit: Visit date not found   Next visit within 3 mo: Visit date not found  Next physical within 3 mo: Visit date not found  Prescriber OR PCP: Heidi Valencia MD  Last diagnosis associated with med order: 1. Acquired hypothyroidism  - levothyroxine (SYNTHROID, LEVOTHROID) 125 MCG tablet [Pharmacy Med Name: LEVOTHYROXINE 125 MCG TABLET]; TAKE 1 TABLET BY MOUTH EVERY OTHER DAY, ALTERNATIVE WITH 150MCG  Dispense: 45 tablet; Refill: 4    2. Hypothyroidism, unspecified type  - levothyroxine (SYNTHROID, LEVOTHROID) 150 MCG tablet [Pharmacy Med Name: LEVOTHYROXINE 150 MCG TABLET]; Take every other day and alternate with 125 dose  Dispense: 45 tablet; Refill: 7    If protocol passes may refill for 12 months if within 3 months of last provider visit (or a total of 15 months).             Passed - TSH on file in past 12 months for patient age 12 & older     TSH   Date Value Ref Range Status   06/29/2020 4.13 0.30 - 5.00 uIU/mL Final                      levothyroxine (SYNTHROID, LEVOTHROID) 150 MCG tablet [Pharmacy Med Name: LEVOTHYROXINE 150 MCG TABLET] 45 tablet 7     Sig: TAKE EVERY OTHER DAY AND ALTERNATE WITH 125 DOSE       Thyroid Hormones Protocol Passed - 11/8/2020   4:34 PM        Passed - Provider visit in past 12 months or next 3 months     Last office visit with prescriber/PCP: 12/20/2019 Heidi Valencia MD OR same dept: 12/20/2019 Heidi Valencia MD OR same specialty: 12/20/2019 Heidi Valencia MD  Last physical: 5/19/2020 Last MTM visit: Visit date not found   Next visit within 3 mo: Visit date not found  Next physical within 3 mo: Visit date not found  Prescriber OR PCP: Heidi Valencia MD  Last diagnosis associated with med order: 1. Acquired hypothyroidism  - levothyroxine (SYNTHROID, LEVOTHROID) 125 MCG tablet [Pharmacy Med Name: LEVOTHYROXINE 125 MCG TABLET]; TAKE 1 TABLET BY MOUTH EVERY OTHER DAY, ALTERNATIVE WITH 150MCG  Dispense: 45 tablet; Refill: 4    2. Hypothyroidism, unspecified type  - levothyroxine (SYNTHROID, LEVOTHROID) 150 MCG tablet [Pharmacy Med Name: LEVOTHYROXINE 150 MCG TABLET]; Take every other day and alternate with 125 dose  Dispense: 45 tablet; Refill: 7    If protocol passes may refill for 12 months if within 3 months of last provider visit (or a total of 15 months).             Passed - TSH on file in past 12 months for patient age 12 & older     TSH   Date Value Ref Range Status   06/29/2020 4.13 0.30 - 5.00 uIU/mL Final

## 2021-06-13 NOTE — TELEPHONE ENCOUNTER
Refill Approved    Rx renewed per Medication Renewal Policy. Medication was last renewed on 10/16/19.    Lona Bacon, [facility] Triage/Med Refill 11/23/2020     Requested Prescriptions   Pending Prescriptions Disp Refills     lisinopriL (PRINIVIL,ZESTRIL) 5 MG tablet [Pharmacy Med Name: LISINOPRIL 5 MG TABLET] 90 tablet 3     Sig: TAKE 1 TABLET BY MOUTH EVERY DAY       Ace Inhibitors Refill Protocol Passed - 11/22/2020  9:29 AM        Passed - PCP or prescribing provider visit in past 12 months       Last office visit with prescriber/PCP: 12/20/2019 Heidi Valencia MD OR same dept: 12/20/2019 Heidi Valencia MD OR same specialty: 12/20/2019 Heidi Valencia MD  Last physical: 5/19/2020 Last MTM visit: Visit date not found   Next visit within 3 mo: Visit date not found  Next physical within 3 mo: Visit date not found  Prescriber OR PCP: Heidi Valencia MD  Last diagnosis associated with med order: 1. Hypertension  - lisinopriL (PRINIVIL,ZESTRIL) 5 MG tablet [Pharmacy Med Name: LISINOPRIL 5 MG TABLET]; TAKE 1 TABLET BY MOUTH EVERY DAY  Dispense: 90 tablet; Refill: 3    If protocol passes may refill for 12 months if within 3 months of last provider visit (or a total of 15 months).             Passed - Serum Potassium in past 12 months     Lab Results   Component Value Date    Potassium 4.0 06/29/2020             Passed - Blood pressure filed in past 12 months     BP Readings from Last 1 Encounters:   06/29/20 110/74             Passed - Serum Creatinine in past 12 months     Creatinine   Date Value Ref Range Status   06/29/2020 0.86 0.60 - 1.10 mg/dL Final

## 2021-06-14 NOTE — TELEPHONE ENCOUNTER
"Prolia Injection Phone Screen      Screening questions have been asked 2-3 days prior to administration visit for Prolia. If any questions are answered with \"Yes,\" this phone encounter were will routed to ordering provider for further evaluation.     1.  When was the last injection?  6/29/2020    2.  Has insurance for this injection been verified?  No pending     3.  Did you experience any new onset achiness or rashes that lasted for over a month with your previous Prolia injection?   No    4.  Do you have a fever over 101?F or a new deep cough that is unusual for you today? No    5.  Have you started any new medications in the last 6 months that you were told could affect your immune system? These may have been prescribed by oncologist, transplant, rheumatology, or dermatology.   No    6.  In the last 6 months have you have gastric bypass or parathyroid surgery?   No    7.  Do you plan dental work requiring drilling into the bone such as implants/extractions or oral surgery in the next 2-3 months?   No    8. Do you have new insurance since the last injection?   No    Patient informed if symptoms discussed above present prior to their administration appointment, they are to notify clinic immediately.     Fior Webb            "

## 2021-06-14 NOTE — TELEPHONE ENCOUNTER
Pt has orders for thyroid to be done she also would like Cholesterol checked as well if you will order.

## 2021-06-15 NOTE — TELEPHONE ENCOUNTER
Refill Approved    Rx renewed per Medication Renewal Policy. Medication was last renewed on 4/30/20.    Pedrito Garcia, [facility] Triage/Med Refill 2/4/2021     Requested Prescriptions   Pending Prescriptions Disp Refills     atorvastatin (LIPITOR) 40 MG tablet [Pharmacy Med Name: ATORVASTATIN 40 MG TABLET] 90 tablet 2     Sig: TAKE 1 TABLET BY MOUTH AT BEDTIME       Statins Refill Protocol (Hmg CoA Reductase Inhibitors) Passed - 2/4/2021 12:09 AM        Passed - PCP or prescribing provider visit in past 12 months      Last office visit with prescriber/PCP: 12/20/2019 Heidi Valencia MD OR same dept: Visit date not found OR same specialty: 12/20/2019 Heidi Valencia MD  Last physical: 5/19/2020 Last MTM visit: Visit date not found   Next visit within 3 mo: Visit date not found  Next physical within 3 mo: Visit date not found  Prescriber OR PCP: Heidi Valencia MD  Last diagnosis associated with med order: 1. Hypertension  - atorvastatin (LIPITOR) 40 MG tablet [Pharmacy Med Name: ATORVASTATIN 40 MG TABLET]; TAKE 1 TABLET BY MOUTH AT BEDTIME  Dispense: 90 tablet; Refill: 2    If protocol passes may refill for 12 months if within 3 months of last provider visit (or a total of 15 months).

## 2021-06-15 NOTE — TELEPHONE ENCOUNTER
Refill Approved    Rx renewed per Medication Renewal Policy. Medication was last renewed on 2/16/20, last OV 10/30/20 .    Pamela Zaragoza, [facility] Triage/Med Refill 2/28/2021     Requested Prescriptions   Pending Prescriptions Disp Refills     buPROPion (WELLBUTRIN XL) 150 MG 24 hr tablet [Pharmacy Med Name: BUPROPION HCL  MG TABLET] 270 tablet 3     Sig: TAKE 3 TABLETS BY MOUTH EVERY DAY       Tricyclics/Misc Antidepressant/Antianxiety Meds Refill Protocol Passed - 2/28/2021 12:56 PM        Passed - PCP or prescribing provider visit in last year     Last office visit with prescriber/PCP: 12/20/2019 Heidi Valencia MD OR same dept: Visit date not found OR same specialty: 12/20/2019 Heidi Valencia MD  Last physical: 5/19/2020 Last MTM visit: Visit date not found   Next visit within 3 mo: Visit date not found  Next physical within 3 mo: Visit date not found  Prescriber OR PCP: Heidi Valencia MD  Last diagnosis associated with med order: 1. Dysthymic disorder  - buPROPion (WELLBUTRIN XL) 150 MG 24 hr tablet [Pharmacy Med Name: BUPROPION HCL  MG TABLET]; TAKE 3 TABLETS BY MOUTH EVERY DAY  Dispense: 270 tablet; Refill: 3  - busPIRone (BUSPAR) 15 MG tablet [Pharmacy Med Name: BUSPIRONE HCL 15 MG TABLET]; TAKE 1 TABLET BY MOUTH TWICE A DAY  Dispense: 180 tablet; Refill: 3    If protocol passes may refill for 12 months if within 3 months of last provider visit (or a total of 15 months).                busPIRone (BUSPAR) 15 MG tablet [Pharmacy Med Name: BUSPIRONE HCL 15 MG TABLET] 180 tablet 3     Sig: TAKE 1 TABLET BY MOUTH TWICE A DAY       Tricyclics/Misc Antidepressant/Antianxiety Meds Refill Protocol Passed - 2/28/2021 12:56 PM        Passed - PCP or prescribing provider visit in last year     Last office visit with prescriber/PCP: 12/20/2019 Heidi Valencia MD OR same dept: Visit date not found OR same specialty: 12/20/2019 Heidi Valencia MD  Last physical: 5/19/2020 Last MTM  visit: Visit date not found   Next visit within 3 mo: Visit date not found  Next physical within 3 mo: Visit date not found  Prescriber OR PCP: Heidi Valencia MD  Last diagnosis associated with med order: 1. Dysthymic disorder  - buPROPion (WELLBUTRIN XL) 150 MG 24 hr tablet [Pharmacy Med Name: BUPROPION HCL  MG TABLET]; TAKE 3 TABLETS BY MOUTH EVERY DAY  Dispense: 270 tablet; Refill: 3  - busPIRone (BUSPAR) 15 MG tablet [Pharmacy Med Name: BUSPIRONE HCL 15 MG TABLET]; TAKE 1 TABLET BY MOUTH TWICE A DAY  Dispense: 180 tablet; Refill: 3    If protocol passes may refill for 12 months if within 3 months of last provider visit (or a total of 15 months).

## 2021-06-16 NOTE — PROGRESS NOTES
Preoperative Exam    Scheduled Procedure: Trigger Finger- Left hand  Surgery Date:  3/23/18  Surgery Location: [facility]   Surgeon:  Dr Deluna    Assessment/Plan:     1. Preop exam for internal medicine  Cinthya is medically stable for anesthesia for treatment of trigger finger.  Of note, there is no history of untoward reactions to anesthetic agents.  She has no history of primary coronary artery disease, type 2 diabetes or chronic lung or kidney disease.  She is functionally active and able to achieve 4 mets of activity.    She is instructed to take her usual prescription medications with sips of water on the a.m. of surgery.  She will hold nonessential supplements on the a.m. of surgery.  She has already been advised to discontinue aspirin and nonsteroidal anti-inflammatory agents along with fish oil prior to her surgical procedure.    - HM2(CBC w/o Differential)  - Comprehensive Metabolic Panel  - Electrocardiogram Perform and Read    2. Acquired hypothyroidism  We will update labs  - Thyroid Stimulating Hormone (TSH)    3. Osteoporosis  Receiving Prolia injections from endocrinology.  Will update labs  - Vitamin D, Total (25-Hydroxy)  - Parathyroid Hormone Intact with Minerals    4. Hypercholesterolemia  She is currently taking cholesterol medication will update labs  - Lipid Cascade    5. Uterine fibroid  Will follow-up with pelvic ultrasound  - US Pelvis With Transvaginal Non OB; Future    6. Left bundle branch block (LBBB) on electrocardiogram  Old.  First documented in 2015.  Echocardiogram was performed at that time and was within normal limits.    Surgical Procedure Risk: Low (reported cardiac risk generally < 1%)  Have you had prior anesthesia?: No  Have you or any family members had a previous anesthesia reaction:  No  Do you or any family members have a history of a clotting or bleeding disorder?: No  Cardiac Risk Assessment: no increased risk for major cardiac complications    Patient  approved for surgery with general or local anesthesia.        Functional Status: Independent  Patient plans to recover at home with family.     Subjective:      Cinthya Waite is a 62 y.o. female who presents for a preoperative consultation.  Of note, she will be receiving a trigger finger release.  Currently, she is without any other chief complaints.    Pertinent anesthesia history is reviewed.  She has had both general and local anesthetic agents.  She denies any untoward reaction such as anaphylaxis, rash, nausea or vomiting.  She denies hyperthermia with anesthetic agents.  There is no personal or family history of bleeding disorder or DVT.  Of note, she does not have primary coronary artery disease, arrhythmias, but does have a left bundle branch block, type 2 diabetes or chronic kidney disease.  She is functionally active attending workout classes at the [facility] on a regular basis.    All other systems reviewed and are negative, other than those listed in the HPI.    Pertinent History  Do you have difficulty breathing or chest pain after walking up a flight of stairs: No  History of obstructive sleep apnea: No  Steroid use in the last 6 months: No  Frequent Aspirin/NSAID use: Yes: Aspirin use is on hold  Prior Blood Transfusion: No  Prior Blood Transfusion Reaction: No  If for some reason prior to, during or after the procedure, if it is medically indicated, would you be willing to have a blood transfusion?:  There is no transfusion refusal.    Current Outpatient Prescriptions   Medication Sig Dispense Refill     aspirin 81 MG EC tablet Take 81 mg by mouth daily.       atorvastatin (LIPITOR) 40 MG tablet TAKE 1 TABLET (40 MG TOTAL) BY MOUTH BEDTIME. 90 tablet 0     buPROPion (WELLBUTRIN XL) 150 MG 24 hr tablet TAKE 3 TABLETS (450 MG TOTAL) BY MOUTH DAILY. TAKE 3 TABS 270 tablet 3     busPIRone (BUSPAR) 15 MG tablet TAKE 1 TABLET BY MOUTH ONCE DAILY 90 tablet 3     cholecalciferol, vitamin D3, 1,000 unit  "tablet Take 5,000 Units by mouth daily.        hydroCHLOROthiazide (MICROZIDE) 12.5 mg capsule TAKE ONE CAPSULE BY MOUTH EVERY MORNING 90 capsule 3     levothyroxine (SYNTHROID) 150 MCG tablet Take 1 tablet (150 mcg total) by mouth every other day. Alternate with the 175 mcg dose 45 tablet 3     levothyroxine (SYNTHROID, LEVOTHROID) 175 MCG tablet Take 1 tablet (175 mcg total) by mouth every other day. Alternate with 150 mcg dose 90 tablet 1     No current facility-administered medications for this visit.         Allergies   Allergen Reactions     Diclofenac Unknown     Elevation of LFTs.       Patient Active Problem List   Diagnosis     Dysthymic Disorder     Raynaud's Phenomenon     Hypothyroidism     Hypercholesterolemia     Constipation     Hypertension     Osteopenia     Breast cancer     History of appendectomy       No past medical history on file.    Past Surgical History:   Procedure Laterality Date     APPENDECTOMY       BREAST SURGERY      lumpectomy      SECTION       IN APPENDECTOMY      Description: Appendectomy;  Recorded: 10/25/2011;     IN  DELIVERY ONLY      Description:  Section;  Recorded: 10/25/2011;  Comments: x2.       Social History     Social History     Marital status:      Spouse name: N/A     Number of children: N/A     Years of education: N/A     Occupational History     Not on file.     Social History Main Topics     Smoking status: Former Smoker     Smokeless tobacco: Not on file      Comment: Smoked in  for 1 year     Alcohol use Not on file     Drug use: Not on file     Sexual activity: Not on file     Other Topics Concern     Not on file     Social History Narrative             Objective:     Vitals:    18 1146   BP: 112/72   Patient Site: Left Arm   Patient Position: Sitting   Cuff Size: Adult Regular   Pulse: 78   Weight: 180 lb (81.6 kg)   Height: 5' 3.5\" (1.613 m)           Physical Exam:  EYES: Eyelids, conjunctiva, and sclera were " normal. Pupils were normal. Cornea, iris, and lens were normal bilaterally.  HEAD, EARS, NOSE, MOUTH, AND THROAT: Head and face were normal. Hearing was normal to voice and the ears were normal to external exam. Nose appearance was normal and there was no discharge. Oropharynx was normal.  TMs were normal.  NECK: Neck appearance was normal. There were no neck masses and the thyroid was not enlarged.  RESPIRATORY: Breathing pattern was normal and the chest moved symmetrically.   Lung sounds were equal bilaterally.  CARDIOVASCULAR: Heart rate and rhythm were normal.  S1 and S2 were normal and there were no extra sounds or murmurs. Peripheral pulses in arms and legs were normal.  Jugular venous pressure was normal.  There was no peripheral edema.  GASTROINTESTINAL: The abdomen was normal in contour.  Bowel sounds were present.   Palpation detected no tenderness, mass, or enlarged organs.   MUSCULOSKELETAL: Skeletal configuration was normal and muscle mass was normal for age. Joint appearance was overall normal.  LYMPHATIC: There were no enlarged nodes.  SKIN/HAIR/NAILS: Skin color was normal.  There were no abnormal skin lesions.  Hair and nails were normal.  NEUROLOGIC: The patient was alert and oriented to person, place, time, and circumstance. Speech was normal. Cranial nerves were normal. Motor strength was normal for age. The patient was normally coordinated.  PSYCHIATRIC:  Mood and affect were normal and the patient had normal recent and remote memory. The patient's judgment and insight were normal.  BREAST: [place] bilaterally.  No new masses, nipple discharge or axillary adenopathy      There are no Patient Instructions on file for this visit.        Labs:    Of note, patient has previously documented left bundle branch block.  Normal echocardiogram in 2015 after this was discovered.  Recent Results (from the past 24 hour(s))   Electrocardiogram Perform and Read    Collection Time: 03/14/18 11:53 AM   Result  Value Ref Range    SYSTOLIC BLOOD PRESSURE  mmHg    DIASTOLIC BLOOD PRESSURE  mmHg    VENTRICULAR RATE 74 BPM    ATRIAL RATE 74 BPM    P-R INTERVAL 126 ms    QRS DURATION 132 ms    Q-T INTERVAL 432 ms    QTC CALCULATION (BEZET) 479 ms    P Axis 48 degrees    R AXIS -17 degrees    T AXIS 89 degrees    MUSE DIAGNOSIS       Normal sinus rhythm  Possible Left atrial enlargement  Left bundle branch block  Abnormal ECG  No previous ECGs available         Immunization History   Administered Date(s) Administered     Hep A, historic 12/30/2011, 02/08/2013     Influenza, inj, historic,unspecified 11/04/2011     Influenza, seasonal,quad inj 36+ mos 10/20/2017     Influenza, seasonal,quad inj 6-35 mos 09/19/2012, 10/06/2014     Influenza,seasonal quad, PF 10/09/2013     Influenza,seasonal quad, PF, 36+MOS 10/14/2015     Pneumo Conj 13-V (2010&after) 03/14/2017     Tdap 01/01/2009     ZOSTER, LIVE 02/03/2012           Electronically signed by Heidi Valencia MD 03/14/18 11:41 AM

## 2021-06-17 NOTE — PATIENT INSTRUCTIONS - HE
Patient Instructions by Heidi Valencia MD at 3/13/2019  7:40 AM     Author: Heidi Valencia MD Service: -- Author Type: Physician    Filed: 3/13/2019  8:15 AM Encounter Date: 3/13/2019 Status: Addendum    : Heidi Valencia MD (Physician)    Related Notes: Original Note by Heidi Valencia MD (Physician) filed at 3/13/2019  8:07 AM         Patient Education   Signs of Hearing Loss  Hearing loss is a problem shared by many people. In fact, it is one of the most common health conditions, particularly as people age. Most people over age 65 have some hearing loss, and by age 80, almost everyone does. Because hearing loss usually occurs slowly over the years, you may not realize your hearing ability has gotten worse.       Have your hearing checked  Contact your [facility] care provider if you:    Have to strain to hear normal conversation.    Have to watch other peoples faces very carefully to follow what theyre saying.    Need to ask people to repeat what theyve said.    Often misunderstand what people are saying.    Turn the volume of the television or radio up so high that others complain.    Feel that people are mumbling when theyre talking to you.    Find that the effort to hear leaves you feeling tired and irritated.    Notice, when using the phone, that you hear better with 1 ear than the other.    3434-2312 [employer]. [address] [place] [address]. All rights reserved. This information is not intended as a substitute for professional medical care. Always follow your healthcare professional's instructions.         Patient Education   Depression and Suicide in Older Adults  Nearly 2 million older Americans have some type of depression. Sadly, some of them even take their own lives. Yet depression among older adults is often ignored. Learn the warning signs. You may help spare a loved one needless pain. You may also save a life.       What Is Depression?  Depression is a  mood disorder that affects the way you think and feel. The most common symptom is a feeling of deep sadness. People who are depressed also may seem tired and listless. And nothing seems to give them pleasure. Its normal to grieve or be sad sometimes. But sadness lessens or passes with time. Depression rarely goes away or improves on its own. Other symptoms of depression are:    Sleeping more or less than normal    Eating more or less than normal    Having headaches, stomachaches, or other pains that dont go away    Feeling nervous, empty, or worthless    Crying a great deal    Thinking or talking about suicide or death    Feeling confused or forgetful  What Causes It?  The causes of depression arent fully known. Certain chemicals in the brain play a role. Depression does run in families. And life stresses can also trigger depression in some people. That may be the case with older adults. They often face great burdens, such as the death of friends or a spouse. They may have failing health. And they are more likely to be alone, lonely, or poor.  How You Can Help  Often, depressed people may not want to ask for help. When they do, they may be ignored. Or, they may receive the wrong treatment. You can help by showing parents and older friends love and support. If they seem depressed, help them find the right treatment. Talk to your doctor. Or contact a local mental health center, social service agency, or hospital. With modern treatment, no one has to suffer from depression.  Resources:    National [place] [facility]  930.545.5622  www.nimh.nih.gov    National [place] on Mental Illness  578.431.5519  www.[facility]ovanna.org    [facility] [place]  670.831.6967  www.[facility].org    National Suicide Hotline  751.104.7221 (800-SUICIDE)      8191-2845 [employer]. [address] [place] [address], [place] [address]. All rights reserved. This information is not intended as a substitute for professional medical care. Always  follow your healthcare professional's instructions.           Advance Directive  Patients advance directive was discussed and I am comfortable with the patients wishes.  Patient Education   Personalized Prevention Plan  You are due for the preventive services outlined below.  Your care team is available to assist you in scheduling these services.  If you have already completed any of these items, please share that information with your care team to update in your medical record.  Health Maintenance   Topic Date Due   ? ADVANCE DIRECTIVES DISCUSSED WITH PATIENT  09/19/2017   ? DXA SCAN  11/09/2018   ? TD 18+ HE  03/10/2020   ? DEPRESSION FOLLOW UP  05/20/2019   ? FALL RISK ASSESSMENT  11/20/2019   ? MAMMOGRAM  10/04/2020   ? COLONOSCOPY  03/20/2023   ? PNEUMOCOCCAL POLYSACCHARIDE VACCINE AGE 65 AND OVER  Completed   ? INFLUENZA VACCINE RULE BASED  Completed   ? TDAP ADULT ONE TIME DOSE  Completed   ? PNEUMOCOCCAL CONJUGATE VACCINE FOR ADULTS (PCV13 OR PREVNAR)  Completed   ? ZOSTER VACCINES  Completed

## 2021-06-17 NOTE — PATIENT INSTRUCTIONS - HE
Patient Education   Urinary Incontinence, Female (Adult)  Urinary incontinence means loss of control of the bladder. This problem affects many women, especially as they get older. If you have incontinence, you may be embarrassed to ask for help. But know that this problem can be treated.  Types of Incontinence  There are different types of incontinence. Two of the main types are described here. You can have more than one type.    Stress incontinence. With this type, urine leaks when pressure (stress) is put on the bladder. This may happen when you cough, sneeze, or laugh. Stress incontinence most often occurs because the pelvic floor muscles that support the bladder and urethra are weak. This can happen after pregnancy and vaginal childbirth or a hysterectomy. It can also be due to excess body weight or hormone changes.    Urge incontinence (also called overactive bladder). With this type, a sudden urge to urinate is felt often. This may happen even though there may not be much urine in the bladder. The need to urinate often during the night is common. Urge incontinence most often occurs because of bladder spasms. This may be due to bladder irritation or infection. Damage to bladder nerves or pelvic muscles, constipation, and certain medicines can also lead to urge incontinence.  Treatment of urinary incontinence depends on the cause. Further evaluation is needed to find the type you have. This will likely include an exam and certain tests. Based on the results, you and your healthcare provider can then plan treatment. Until a diagnosis is made, the home care tips below can help relieve symptoms.  Home care    Do pelvic floor muscle exercises, if they are prescribed. The pelvic floor muscles help support the bladder and urethra. Many women find that their symptoms improve when doing special exercises that strengthen these muscles. To do the exercises contract the muscles you would use to stop your stream of urine,  but do this when you re not urinating. Hold for 10 seconds, then relax. Repeat 10 to 20 times in a row, at least 3 times a day. Your provider may give you other instructions for how to do the exercises and how often.    Keep a bladder diary. This helps track how often and how much you urinate over a set period of time. Bring this diary with you to your next visit with the provider. The information can help your provider learn more about your bladder problem.    Lose weight, if advised to by your provider. Excess weight puts pressure on the bladder. Your provider can help you create a weight-loss plan that s right for you. This may include exercising more and making certain diet changes.    Don't consume foods and drinks that may irritate the bladder. These can include alcohol and caffeinated drinks.    Quit smoking. Smoking and other tobacco use can lead to chronic cough that strains the pelvic floor muscles. Smoking may also damage the bladder and urethra. Talk with your provider about treatments or methods you can use to quit smoking.    If drinking large amounts of fluid causes you to have symptoms, you may be advised to limit your fluid intake. You may also be advised to drink most of your fluids during the day and to limit fluids at night.    If you re worried about urine leakage or accidents, you may wear absorbent pads to catch urine. Change the pads often. This helps reduce discomfort. It may also reduce the risk of skin or bladder infections.  Follow-up care  Follow up with your healthcare provider, or as directed. It may take some to find the right treatment for your problem. Your treatment plan may include special therapies or medicines. Certain procedures or surgery may also be options. Be sure to discuss any questions you have with your provider.  When to seek medical advice  Call the healthcare provider right away if any of these occur:    Fever of 100.4 F (38 C) or higher, or as directed by your  provider    Bladder pain or fullness    Abdominal swelling    Nausea or vomiting    Back pain    Weakness, dizziness or fainting  Date Last Reviewed: 10/1/2017    8594-6997 [employer]. [address] [facility], [place] [address]. All rights reserved. This information is not intended as a substitute for professional medical care. Always follow your healthcare professional's instructions.     Patient Education   Your Health Risk Assessment indicates you feel you are not in good emotional health.    Recreation   Recreation is not limited to sports and team events. It includes any activity that provides relaxation, interest, enjoyment, and exercise. Recreation provides an outlet for physical, mental, and social energy. It can give a sense of worth and achievement. It can help you stay healthy.    Mental Exercise and Social Involvement  Mental and emotional health is as important as physical health. Keep in touch with friends and family. Stay as active as possible. Continue to learn and challenge yourself.   Things you can do to stay mentally active are:    Learn something new, like a foreign language or musical instrument.     Play SCRABBLE or do crossword puzzles. If you cannot find people to play these games with you at home, you can play them with others on your computer through the Internet.     Join a games club--anything from card games to chess or checkers or lawn bowling.     Start a new hobby.     Go back to school.     Volunteer.     Read.     Keep up with world events.         Advance Directive  Patient s advance directive was discussed and I am comfortable with the patient s wishes.  Patient Education   Personalized Prevention Plan  You are due for the preventive services outlined below.  Your care team is available to assist you in scheduling these services.  If you have already completed any of these items, please share that information with your care team to update in your medical  record.  There are no preventive care reminders to display for this patient.

## 2021-06-17 NOTE — PROGRESS NOTES
"  Assessment and Plan:   Annual wellness forms reviewed.  No new needs identified  Patient has been advised of split billing requirements and indicates understanding: No  1. Encounter for preventive care  She is up-to-date on colon cancer screening with last colonoscopy 2018.  Mammography, bone density, ophthalmologic, dental and Derm care are all up-to-date.    Lifestyle reviewed: She is exercising regularly.  Weight is stable    2. Acquired hypothyroidism  Labs current.  Will recheck first of the year    3. Hypertension  Stable on current medications    4. Age-related osteoporosis without current pathological fracture  With improvement to low bone mass with moderate fracture risk.  She will complete the year with Prolia.  The next one will be due in July.  Thereafter, we will switch to bisphosphonate to \"seal the gains \".  She had been on bisphosphonates previously and would like her to have a 5-year drug-free holiday.  Therefore, in 2022, will reimplement alendronate/Fosamax.  - denosumab 60 mg (PROLIA 60 mg/ml)    5. Hypercholesterolemia  Able on atorvastatin    6. Left bundle branch block (LBBB) on electrocardiogram  Noted history    7. Dysthymic disorder  Stable-some pandemic issues-but no med changes needed    The patient's current medical problems were reviewed.      The following health maintenance schedule was reviewed with the patient and provided in printed form in the after visit summary:   Health Maintenance   Topic Date Due     MEDICARE ANNUAL WELLNESS VISIT  05/25/2022     FALL RISK ASSESSMENT  05/25/2022     COLORECTAL CANCER SCREENING  03/20/2023     MAMMOGRAM  04/07/2023     ADVANCE CARE PLANNING  05/19/2025     LIPID  02/05/2026     TD 18+ HE  09/24/2029     DEXA SCAN  04/05/2036     DEPRESSION ACTION PLAN  Completed     HEPATITIS C SCREENING  Completed     Pneumococcal Vaccine: Pediatrics (0 to 5 Years) and At-Risk Patients (6 to 64 Years)  Completed     Pneumococcal Vaccine: 65+ Years  " Completed     INFLUENZA VACCINE RULE BASED  Completed     ZOSTER VACCINES  Completed     COVID-19 Vaccine  Completed     HEPATITIS B VACCINES  Aged Out       Subjective:   Chief Complaint: Cinthya Waite is an 65 y.o. female here for an Annual Wellness visit.   HPI: Cinthya is a delightful 65-year-old breast cancer survival-diagnosed .  She has completed treatment and is more than 10 years out.  She will be transitioning to a survivor program through oncology.  She gets annual mammograms and has had no new problems.  Overall, she is doing well.  She continues to engage in a very healthy lifestyle biking, eating well and taking good care of herself.    She has no new or active medical problems.    Health maintenance is reviewed and updated in the chart    Review of Systems: She has some occasional constipation and insomnia please see above.  The rest of the review of systems are negative for all systems.    Patient Care Team:  Heidi Valencia MD as PCP - General  Heidi Valencia MD as Assigned PCP  Francia Stallworth MD as Physician (Genetics)  Nadeen Goldsmith PA-C as Physician Assistant (Physician Assistant)  Johnathan Deluna MD as Physician (Orthopedic Surgery)     Patient Active Problem List   Diagnosis     Dysthymic Disorder     Raynaud's Phenomenon     Hypothyroidism     Hypercholesterolemia     Hypertension     Age-related osteoporosis without current pathological fracture     Breast cancer (H)     History of appendectomy     Left bundle branch block (LBBB) on electrocardiogram     History reviewed. No pertinent past medical history.   Past Surgical History:   Procedure Laterality Date     APPENDECTOMY       BREAST SURGERY      lumpectomy      SECTION       HYSTERECTOMY       IN APPENDECTOMY      Description: Appendectomy;  Recorded: 10/25/2011;     IN  DELIVERY ONLY      Description:  Section;  Recorded: 10/25/2011;  Comments: x2.      Family History   Problem Relation Age  of Onset     Breast cancer Mother      Stroke Mother      Stroke Brother       Social History     Socioeconomic History     Marital status:      Spouse name: Not on file     Number of children: Not on file     Years of education: Not on file     Highest education level: Not on file   Occupational History     Not on file   Social Needs     Financial resource strain: Not on file     Food insecurity     Worry: Not on file     Inability: Not on file     Transportation needs     Medical: Not on file     Non-medical: Not on file   Tobacco Use     Smoking status: Former Smoker     Smokeless tobacco: Never Used     Tobacco comment: Smoked in 1975 for 1 year   Substance and Sexual Activity     Alcohol use: Not on file     Drug use: Not on file     Sexual activity: Not on file   Lifestyle     Physical activity     Days per week: Not on file     Minutes per session: Not on file     Stress: Not on file   Relationships     Social connections     Talks on phone: Not on file     Gets together: Not on file     Attends Muslim service: Not on file     Active member of club or organization: Not on file     Attends meetings of clubs or organizations: Not on file     Relationship status: Not on file     Intimate partner violence     Fear of current or ex partner: Not on file     Emotionally abused: Not on file     Physically abused: Not on file     Forced sexual activity: Not on file   Other Topics Concern     Not on file   Social History Narrative     Not on file      Current Outpatient Medications   Medication Sig Dispense Refill     amLODIPine (NORVASC) 5 MG tablet TAKE 1 TABLET BY MOUTH EVERY DAY 90 tablet 3     aspirin 81 MG EC tablet Take 81 mg by mouth daily.       atorvastatin (LIPITOR) 40 MG tablet TAKE 1 TABLET BY MOUTH AT BEDTIME 90 tablet 2     buPROPion (WELLBUTRIN XL) 150 MG 24 hr tablet TAKE 3 TABLETS BY MOUTH EVERY  tablet 2     busPIRone (BUSPAR) 15 MG tablet Take 1 tablet (15 mg total) by mouth 2 (two)  "times a day. (Patient taking differently: Take 15 mg by mouth every morning. ) 180 tablet 2     calcium citrate (CALCITRATE) 200 mg (950 mg) tablet Take 1 tablet by mouth daily.       cholecalciferol, vitamin D3, 100 mcg (4,000 unit) cap Take 4,000 Units by mouth daily.        levothyroxine (SYNTHROID, LEVOTHROID) 125 MCG tablet TAKE 1 TABLET BY MOUTH EVERY OTHER DAY, ALTERNATIVE WITH 150MCG 45 tablet 1     levothyroxine (SYNTHROID, LEVOTHROID) 150 MCG tablet TAKE EVERY OTHER DAY AND ALTERNATE WITH 125 DOSE 45 tablet 1     lisinopriL (PRINIVIL,ZESTRIL) 5 MG tablet TAKE 1 TABLET BY MOUTH EVERY DAY 90 tablet 1     MAGNESIUM CARBONATE ORAL Take 250 mg by mouth.       melatonin (MELATIN ORAL) Take by mouth at bedtime.       Current Facility-Administered Medications   Medication Dose Route Frequency Provider Last Rate Last Admin     denosumab 60 mg (PROLIA 60 mg/ml)  60 mg Subcutaneous Once Heidi Valencia MD          Objective:   Vital Signs:   Visit Vitals  /64 (Patient Site: Left Arm, Patient Position: Sitting, Cuff Size: Adult Regular)   Pulse 82   Resp 18   Ht 5' 3.45\" (1.612 m)   Wt 163 lb 7 oz (74.1 kg)   SpO2 98%   BMI 28.54 kg/m           VisionScreening:  No exam data present     PHYSICAL EXAM  EYES: Eyelids, conjunctiva, and sclera were normal. Pupils were normal. Cornea, iris, and lens were normal bilaterally.  HEAD, EARS, NOSE, MOUTH, AND THROAT: Head and face were normal. Hearing was normal to voice and the ears were normal to external exam. Nose appearance was normal and there was no discharge. Oropharynx was normal.  TMs were normal.  NECK: Neck appearance was normal. There were no neck masses and the thyroid was not enlarged.  RESPIRATORY: Breathing pattern was normal and the chest moved symmetrically.   Lung sounds were equal bilaterally.  CARDIOVASCULAR: Heart rate and rhythm were normal.  S1 and S2 were normal and there were no extra sounds or murmurs. Peripheral pulses in arms and legs were " normal.  Jugular venous pressure was normal.  There was no peripheral edema.  GASTROINTESTINAL: The abdomen was normal in contour.  Bowel sounds were present.   Palpation detected no tenderness, mass, or enlarged organs.   MUSCULOSKELETAL: Skeletal configuration was normal and muscle mass was normal for age. Joint appearance was overall normal.  LYMPHATIC: There were no enlarged nodes.  SKIN/HAIR/NAILS: Skin color was normal.  There were no abnormal skin lesions.  Hair and nails were normal.  NEUROLOGIC: The patient was alert and oriented to person, place, time, and circumstance. Speech was normal. Cranial nerves were normal. Motor strength was normal for age. The patient was normally coordinated.  PSYCHIATRIC:  Mood and affect were normal and the patient had normal recent and remote memory. The patient's judgment and insight were normal.  BREASTS: Examined bilaterally and within normal limits        Assessment Results 5/25/2021   Activities of Daily Living No help needed   Instrumental Activities of Daily Living No help needed   Get Up and Go Score Less than 12 seconds   Mini Cog Total Score 5   Some recent data might be hidden     A Mini-Cog score of 0-2 suggests the possibility of dementia, score of 3-5 suggests no dementia    Identified Health Risks:     Information on urinary incontinence and treatment options given to patient.  The patient was provided with suggestions to help her develop a healthy emotional lifestyle.   Patient's advanced directive was discussed and I am comfortable with the patient's wishes.

## 2021-06-19 NOTE — PROGRESS NOTES
Preoperative Exam    Scheduled Procedure: Hysterectomy   Surgery Date:  08/03/2018   Surgery Location: [facility]    Surgeon:  Dr. Gillette     Assessment/Plan:     1. Preop general physical exam  Cinthya is medically stable for anesthesia for robotically assisted hysterectomy.  She currently exhibits no symptoms of  infectious disease or of a cardiovascular nature.  Her history is negative for untoward reactions to local or general anesthetic agents.    Her history is negative for primary coronary artery disease, arrhythmia, chronic lung disease or chronic kidney disease.  She is functionally active.    Preoperative medications are reviewed.  She is instructed to avoid aspirin, NSAIDs, fish oil and supplements for 7-10 days prior to her up and coming surgical procedure.  She may take her usual prescription medications with sips of water if desired.  Otherwise, she may elect to be n.p.o.    - HM2(CBC w/o Differential)   - Basic Metabolic Panel    2. Endometrial hyperplasia  As above    3. Breast cancer (H)  By history.    4. Acquired hypothyroidism  We will update thyroid function  - Thyroid Stimulating Hormone (TSH)      Surgical Procedure Risk: Low (reported cardiac risk generally < 1%)  Have you had prior anesthesia?: Yes  Have you or any family members had a previous anesthesia reaction:  No  Do you or any family members have a history of a clotting or bleeding disorder?: No  Cardiac Risk Assessment: no increased risk for major cardiac complications    Patient approved for surgery with general or local anesthesia.        Functional Status: Independent  Patient plans to recover at home with family.     Subjective:      Cinthya Waite is a 62 y.o. female who presents for a preoperative consultation.  Of note, she is a breast cancer survivor.  She has had issues in the past with endometrial hyperplasia.  She has recently opted to proceed with hysterectomy as opposed to continued vigilant monitoring.  Of  note, she denies any current symptoms of infectious disease.  She denies a cough, fever, chills or any constitutional symptoms.  She denies any gastrointestinal or urinary tract symptoms.  Her history is also negative for chest pain, shortness of breath, palpitations or lower extremity edema.  She is feeling well.    Pertinent anesthesia history is reviewed.  She denies any untoward reaction such as anaphylaxis, hyperthermia, nausea or skin rashes with both general or local anesthetic agents.  Her history is negative for DVT or bleeding disorder.  There is no personal history of primary coronary artery disease, arrhythmia, chronic lung or kidney disease.  She does not have type 2 diabetes.    All other systems reviewed and are negative, other than those listed in the HPI.    Pertinent History  Do you have difficulty breathing or chest pain after walking up a flight of stairs: No  History of obstructive sleep apnea: No  Steroid use in the last 6 months: No  Frequent Aspirin/NSAID use: Yes: baby aspirin  Prior Blood Transfusion: No  Prior Blood Transfusion Reaction: No  If for some reason prior to, during or after the procedure, if it is medically indicated, would you be willing to have a blood transfusion?:  There is no transfusion refusal.    Current Outpatient Prescriptions   Medication Sig Dispense Refill     aspirin 81 MG EC tablet Take 81 mg by mouth daily.       atorvastatin (LIPITOR) 40 MG tablet TAKE 1 TABLET (40 MG TOTAL) BY MOUTH BEDTIME. 90 tablet 2     buPROPion (WELLBUTRIN XL) 150 MG 24 hr tablet TAKE 3 TABLETS (450 MG TOTAL) BY MOUTH DAILY. TAKE 3 TABS 270 tablet 3     busPIRone (BUSPAR) 15 MG tablet TAKE 1 TABLET BY MOUTH ONCE DAILY 90 tablet 3     calcium citrate (CALCITRATE) 200 mg (950 mg) tablet Take 1 tablet by mouth daily.       cholecalciferol, vitamin D3, 1,000 unit tablet Take 5,000 Units by mouth daily.        hydroCHLOROthiazide (MICROZIDE) 12.5 mg capsule TAKE ONE CAPSULE BY MOUTH EVERY  "MORNING 90 capsule 2     levothyroxine (SYNTHROID) 150 MCG tablet Take 1 tablet (150 mcg total) by mouth every other day. Alternate with the 175 mcg dose 45 tablet 3     levothyroxine (SYNTHROID, LEVOTHROID) 175 MCG tablet Take 1 tablet (175 mcg total) by mouth every other day. Alternate with 150 mcg dose 45 tablet 3     No current facility-administered medications for this visit.         Allergies   Allergen Reactions     Diclofenac Unknown     Elevation of LFTs.       Patient Active Problem List   Diagnosis     Dysthymic Disorder     Raynaud's Phenomenon     Hypothyroidism     Hypercholesterolemia     Constipation     Hypertension     Osteopenia     Breast cancer (H)     History of appendectomy     Left bundle branch block (LBBB) on electrocardiogram       No past medical history on file.    Past Surgical History:   Procedure Laterality Date     APPENDECTOMY       BREAST SURGERY      lumpectomy      SECTION       NM APPENDECTOMY      Description: Appendectomy;  Recorded: 10/25/2011;     NM  DELIVERY ONLY      Description:  Section;  Recorded: 10/25/2011;  Comments: x2.       Social History     Social History     Marital status:      Spouse name: N/A     Number of children: N/A     Years of education: N/A     Occupational History     Not on file.     Social History Main Topics     Smoking status: Former Smoker     Smokeless tobacco: Never Used      Comment: Smoked in  for 1 year     Alcohol use Not on file     Drug use: Not on file     Sexual activity: Not on file     Other Topics Concern     Not on file     Social History Narrative             Objective:     Vitals:    18 1306   BP: (!) 120/92   Patient Site: Left Arm   Patient Position: Sitting   Cuff Size: Adult Regular   Pulse: 80   Weight: 181 lb 14.4 oz (82.5 kg)   Height: 5' 3.39\" (1.61 m)           Physical Exam:  EYES: Eyelids, conjunctiva, and sclera were normal. Pupils were normal. Cornea, iris, and lens were " normal bilaterally.  HEAD, EARS, NOSE, MOUTH, AND THROAT: Head and face were normal. Hearing was normal to voice and the ears were normal to external exam. Nose appearance was normal and there was no discharge. Oropharynx was normal.  TMs were normal.  NECK: Neck appearance was normal. There were no neck masses and the thyroid was not enlarged.  RESPIRATORY: Breathing pattern was normal and the chest moved symmetrically.   Lung sounds were equal bilaterally.  CARDIOVASCULAR: Heart rate and rhythm were normal.  S1 and S2 were normal and there were no extra sounds or murmurs. Peripheral pulses in arms and legs were normal.  Jugular venous pressure was normal.  There was no peripheral edema.  GASTROINTESTINAL: The abdomen was normal in contour.  Bowel sounds were present.   Palpation detected no tenderness, mass, or enlarged organs.   MUSCULOSKELETAL: Skeletal configuration was normal and muscle mass was normal for age. Joint appearance was overall normal.  LYMPHATIC: There were no enlarged nodes.  SKIN/HAIR/NAILS: Skin color was normal.  There were no abnormal skin lesions.  Hair and nails were normal.  NEUROLOGIC: The patient was alert and oriented to person, place, time, and circumstance. Speech was normal. Cranial nerves were normal. Motor strength was normal for age. The patient was normally coordinated.  PSYCHIATRIC:  Mood and affect were normal and the patient had normal recent and remote memory. The patient's judgment and insight were normal.    A 12-lead EKG was reviewed from March 2018.  No acute abnormalities are noted.  Of note, she has a chronic left bundle branch block.      There are no Patient Instructions on file for this visit.        Labs:  Recent Results (from the past 120 hour(s))   HM2(CBC w/o Differential)    Collection Time: 07/06/18 12:52 PM   Result Value Ref Range    WBC 6.9 4.0 - 11.0 thou/uL    RBC 5.07 3.80 - 5.40 mill/uL    Hemoglobin 16.1 (H) 12.0 - 16.0 g/dL    Hematocrit 47.5 (H) 35.0  - 47.0 %    MCV 94 80 - 100 fL    MCH 31.8 27.0 - 34.0 pg    MCHC 33.9 32.0 - 36.0 g/dL    RDW 11.2 11.0 - 14.5 %    Platelets 248 140 - 440 thou/uL    MPV 8.6 7.0 - 10.0 fL       Immunization History   Administered Date(s) Administered     Hep A, historic 12/30/2011, 02/08/2013     Influenza, inj, historic,unspecified 11/04/2011     Influenza, seasonal,quad inj 36+ mos 10/20/2017     Influenza, seasonal,quad inj 6-35 mos 09/19/2012, 10/06/2014     Influenza,seasonal quad, PF 10/09/2013     Influenza,seasonal quad, PF, 36+MOS 10/14/2015     Pneumo Conj 13-V (2010&after) 03/14/2017     Tdap 01/01/2009     ZOSTER, LIVE 02/03/2012           Electronically signed by Heidi Valencia MD 07/06/18 1:04 PM

## 2021-06-21 NOTE — PROGRESS NOTES
[facility] Follow Up Note    Assessment/Plan:  1. Hypertension  Episodic hypertension-stress related.  Blood pressure okay today.  Recommendation: Purchase home blood pressure cuff and monitor blood pressures at home.  She should do this every other day when sitting for 5-10 minutes with feet flat on the floor.  If blood pressures are consistently greater than 140, there are contact me.  Otherwise, return in spring for physical    2. Hypercholesterolemia  Stable on current medications    3. Acquired hypothyroidism  Being followed by endocrinology    4.  Preventative medicine  Pneumonia shingles vaccines today      Heidi Valencia MD    Chief Complaint:  Chief Complaint   Patient presents with     Medication Management     Follow-up       History of Present Illness:  Cinthya is a 63 y.o. female who is here today for follow-up with regard to blood pressure.  Of note, she had somewhat of a stressful year with her hysterectomy followed by an abnormal mammogram.  She underwent a stereotactic biopsy for calcifications on mammography.  Thankfully, this came back negative.  However, during the course of the evaluation, her blood pressure was intermittently elevated-sometimes up to 190.  Her oncologist referred her back here today for blood pressure check.  She states that since the stress is passed, she is feeling better.  She denies any chest pain, shortness of breath or any new headache type pain.    Her hysterectomy went well.  Mammography was negative.  She is followed also by endocrinology for hyperparathyroidism and thyroid dysfunction.    Review of Systems:  A comprehensive review of systems was performed and was otherwise negative    PFSH:  Social History: She is  with adult children  Social History     Tobacco Use   Smoking Status Former Smoker   Smokeless Tobacco Never Used   Tobacco Comment    Smoked in 1975 for 1 year       Past History: No past medical history on file.    Current  Outpatient Medications   Medication Sig Dispense Refill     aspirin 81 MG EC tablet Take 81 mg by mouth daily.       atorvastatin (LIPITOR) 40 MG tablet TAKE 1 TABLET (40 MG TOTAL) BY MOUTH BEDTIME. 90 tablet 2     buPROPion (WELLBUTRIN XL) 150 MG 24 hr tablet TAKE 3 TABLETS BY MOUTH ONCE DAILY 270 tablet 2     busPIRone (BUSPAR) 15 MG tablet Take 1 tablet (15 mg total) by mouth 2 (two) times a day. 180 tablet 3     calcium citrate (CALCITRATE) 200 mg (950 mg) tablet Take 1 tablet by mouth daily.       cholecalciferol, vitamin D3, 1,000 unit tablet Take 5,000 Units by mouth daily.        hydroCHLOROthiazide (MICROZIDE) 12.5 mg capsule TAKE ONE CAPSULE BY MOUTH EVERY MORNING 90 capsule 2     levothyroxine (SYNTHROID) 150 MCG tablet Take 1 tablet (150 mcg total) by mouth every other day. Alternate with the 175 mcg dose 45 tablet 3     levothyroxine (SYNTHROID, LEVOTHROID) 175 MCG tablet Take 1 tablet (175 mcg total) by mouth every other day. Alternate with 150 mcg dose 45 tablet 3     [START ON 11/21/2018] pneumococcal vaccine (PNEUMOVAX 23) 25 mcg/0.5 mL injection Inject 0.5 mL into the shoulder, thigh, or buttocks Once prior to discharge for 1 dose. 0.5 mL 0     No current facility-administered medications for this visit.        Family History: Nothing new    Physical Exam:  General Appearance:   She appears well and in no acute distress  Vitals:    11/20/18 0831   BP: 114/68   Patient Site: Left Arm   Patient Position: Sitting   Cuff Size: Adult Regular   Pulse: 78   Weight: 187 lb (84.8 kg)     Wt Readings from Last 3 Encounters:   11/20/18 187 lb (84.8 kg)   07/06/18 181 lb 14.4 oz (82.5 kg)   03/14/18 180 lb (81.6 kg)     Body mass index is 32.72 kg/m .    EYES: Eyelids, conjunctiva, and sclera were normal. Pupils were normal. Cornea, iris, and lens were normal bilaterally.  HEAD, EARS, NOSE, MOUTH, AND THROAT: Head and face were normal. Hearing was normal to voice and the ears were normal to external exam. Nose  appearance was normal and there was no discharge. Oropharynx was normal.  TMs were normal.  NECK: Neck appearance was normal. There were no neck masses and the thyroid was not enlarged.  RESPIRATORY: Breathing pattern was normal and the chest moved symmetrically.   Lung sounds were equal bilaterally.  CARDIOVASCULAR: Heart rate and rhythm were normal.  S1 and S2 were normal and there were no extra sounds or murmurs. Peripheral pulses in arms and legs were normal.  Jugular venous pressure was normal.  There was no peripheral edema.  GASTROINTESTINAL: The abdomen was normal in contour.  Bowel sounds were present.   Palpation detected no tenderness, mass, or enlarged organs.   MUSCULOSKELETAL: Skeletal configuration was normal and muscle mass was normal for age. Joint appearance was overall normal.  LYMPHATIC: There were no enlarged nodes.  SKIN/HAIR/NAILS: Skin color was normal.  There were no abnormal skin lesions.  Hair and nails were normal.  NEUROLOGIC: The patient was alert and oriented to person, place, time, and circumstance. Speech was normal. Cranial nerves were normal. Motor strength was normal for age. The patient was normally coordinated.  PSYCHIATRIC:  Mood and affect were normal and the patient had normal recent and remote memory. The patient's judgment and insight were normal.          Data Review:    Analysis and Summary of Old Records (2): Reviewed records from the you and oncology    Records Requested (1): -      Other History Summarized (from other people in the room) (2): -    Radiology Tests Summarized (XRAY/CT/MRI/DXA) (1): -    Labs Reviewed (1): Reviewed labs from last visit    Medicine Tests Reviewed (EKG/ECHO/COLONOSCOPY/EGD) (1): -    Independent Review of EKG or X-RAY (2): -

## 2021-06-22 ENCOUNTER — RECORDS - HEALTHEAST (OUTPATIENT)
Dept: ADMINISTRATIVE | Facility: OTHER | Age: 66
End: 2021-06-22

## 2021-06-24 NOTE — PROGRESS NOTES
Assessment and Plan:   Welcome to Medicare forms reviewed.  No new needs identified.  She is up-to-date on ophthalmologic, dental and Derm care.    1. Preventative health care  Immunizations are up-to-date.  Mammography, colon cancer screening and bone density are up-to-date.  Have discussed the importance of weight loss and healthy eating.  [facility]  - HM2(CBC w/o Differential)  - Comprehensive Metabolic Panel  - Lipid Cascade  - US Abdominal Aorta; Future    2. Hypercholesterolemia  We will update labs  - HM2(CBC w/o Differential)  - Comprehensive Metabolic Panel  - Lipid Cascade    3. Hypertension  Under acceptable control on current medications.  We will continue to monitor    4. Malignant neoplasm of left female breast, unspecified estrogen receptor status, unspecified site of breast (H)  Monitored by oncology.  Exam unremarkable.  Latest mammogram negative.  - Comprehensive Metabolic Panel    5. Acquired hypothyroidism  We will update lab  - Thyroid [place]    6. Age-related osteoporosis without current pathological fracture  With lowest T score in the left distal radius.  Most recent bone density performed at an outside facility was reviewed.  It is stable and shows good response to Prolia.  Recommendations: Transition to this office for Prolia injections.  - Vitamin D, Total (25-Hydroxy)    7. Welcome to Medicare preventive visit  Ordered ECG and ultrasound of the abdomen  - Electrocardiogram Perform and Read    8. Arthritis  Great toe with hypertrophy on the left.  Will check uric acid.  Also, encourage her to follow-up with [place] foot specialist  - Uric Acid    9. Hives  Likely secondary to salmon.  Will check seafood panel  - Seafood Panel IgE     The patient's current medical problems were reviewed.      The following health maintenance schedule was reviewed with the patient and provided in printed form in the after visit summary:   Health Maintenance    Topic Date Due     ADVANCE DIRECTIVES DISCUSSED WITH PATIENT  09/19/2017     DXA SCAN  11/09/2018     TD 18+ HE  03/10/2020     DEPRESSION FOLLOW UP  05/20/2019     FALL RISK ASSESSMENT  11/20/2019     MAMMOGRAM  10/04/2020     COLONOSCOPY  03/20/2023     PNEUMOCOCCAL POLYSACCHARIDE VACCINE AGE 65 AND OVER  Completed     INFLUENZA VACCINE RULE BASED  Completed     TDAP ADULT ONE TIME DOSE  Completed     PNEUMOCOCCAL CONJUGATE VACCINE FOR ADULTS (PCV13 OR PREVNAR)  Completed     ZOSTER VACCINES  Completed        Subjective:   Chief Complaint: Cinthya Waite is an 63 y.o. female here for a Welcome to Medicare visit.   HPI: Cinthya is a delightful 63-year-old female who is here today for her welcome to Medicare physical examination.  Of note, in general she is doing well.  She is assisting her dear friend as she goes through palliative care for advanced uterine carcinoma.  This is very difficult for Cinthya.  She denies any new specific problems for herself.  She does admit that her diet has not been great.  She has not been able to exercise regularly, however, vows to get back on track.    She has no major concerns.  She does note some hives that she believes are secondary to seafood.  She wonders if there is a way to verify as [place].    Health maintenance is reviewed and updated in the electronic health record.    Review of Systems: Has some urinary urgency and stress incontinence please see above.  The rest of the review of systems are negative for all systems.    Patient Care Team:  Heidi Valencia MD as PCP - General   Jody Gupta MD-endocrinology      Patient Active Problem List   Diagnosis     Dysthymic Disorder     Raynaud's Phenomenon     Hypothyroidism     Hypercholesterolemia     Hypertension     Age-related osteoporosis without current pathological fracture     Breast cancer (H)     History of appendectomy     Left bundle branch block (LBBB) on electrocardiogram     No  past medical history on file.   Past Surgical History:   Procedure Laterality Date     APPENDECTOMY       BREAST SURGERY      lumpectomy      SECTION       HYSTERECTOMY       NH APPENDECTOMY      Description: Appendectomy;  Recorded: 10/25/2011;     NH  DELIVERY ONLY      Description:  Section;  Recorded: 10/25/2011;  Comments: x2.      Family History   Problem Relation Age of Onset     Breast cancer Mother      Stroke Mother      Stroke Brother       Social History     Socioeconomic History     Marital status:      Spouse name: Not on file     Number of children: Not on file     Years of education: Not on file     Highest education level: Not on file   Occupational History     Not on file   Social Needs     Financial resource strain: Not on file     Food insecurity:     Worry: Not on file     Inability: Not on file     Transportation needs:     Medical: Not on file     Non-medical: Not on file   Tobacco Use     Smoking status: Former Smoker     Smokeless tobacco: Never Used     Tobacco comment: Smoked in  for 1 year   Substance and Sexual Activity     Alcohol use: Not on file     Drug use: Not on file     Sexual activity: Not on file   Lifestyle     Physical activity:     Days per week: Not on file     Minutes per session: Not on file     Stress: Not on file   Relationships     Social connections:     Talks on phone: Not on file     Gets together: Not on file     Attends Baptism service: Not on file     Active member of club or organization: Not on file     Attends meetings of clubs or organizations: Not on file     Relationship status: Not on file     Intimate partner violence:     Fear of current or ex partner: Not on file     Emotionally abused: Not on file     Physically abused: Not on file     Forced sexual activity: Not on file   Other Topics Concern     Not on file   Social History Narrative     Not on file       Current Outpatient Medications   Medication Sig Dispense  "Refill     amLODIPine (NORVASC) 5 MG tablet Take 1 tablet (5 mg total) by mouth daily. 30 tablet 11     aspirin 81 MG EC tablet Take 81 mg by mouth daily.       atorvastatin (LIPITOR) 40 MG tablet TAKE 1 TABLET (40 MG TOTAL) BY MOUTH BEDTIME. 90 tablet 2     buPROPion (WELLBUTRIN XL) 150 MG 24 hr tablet TAKE 3 TABLETS BY MOUTH ONCE DAILY 270 tablet 2     busPIRone (BUSPAR) 15 MG tablet Take 1 tablet (15 mg total) by mouth 2 (two) times a day. 180 tablet 3     calcium citrate (CALCITRATE) 200 mg (950 mg) tablet Take 1 tablet by mouth daily.       cholecalciferol, vitamin D3, 1,000 unit tablet Take 5,000 Units by mouth daily.        hydroCHLOROthiazide (MICROZIDE) 12.5 mg capsule TAKE ONE CAPSULE BY MOUTH EVERY MORNING 90 capsule 2     levothyroxine (SYNTHROID) 150 MCG tablet Take 1 tablet (150 mcg total) by mouth every other day. Alternate with the 175 mcg dose 45 tablet 3     levothyroxine (SYNTHROID, LEVOTHROID) 175 MCG tablet Take 1 tablet (175 mcg total) by mouth every other day. Alternate with 150 mcg dose 45 tablet 3     No current facility-administered medications for this visit.       Objective:   Vital Signs:   Visit Vitals  /82 (Patient Site: Left Arm, Patient Position: Sitting, Cuff Size: Adult Large)   Pulse 82   Ht 5' 3.5\" (1.613 m)   Wt 190 lb 7 oz (86.4 kg)   SpO2 98%   BMI 33.21 kg/m         EKG:  Normal sinus rhythm with a left bundle branch block.  This is stable    VisionScreening:   Visual Acuity Screening    Right eye Left eye Both eyes   Without correction:      With correction: 20/20 20/25 20/20        PHYSICAL EXAM  EYES: Eyelids, conjunctiva, and sclera were normal. Pupils were normal. Cornea, iris, and lens were normal bilaterally.  HEAD, EARS, NOSE, MOUTH, AND THROAT: Head and face were normal. Hearing was normal to voice and the ears were normal to external exam. Nose appearance was normal and there was no discharge. Oropharynx was normal.  TMs were normal.  NECK: Neck appearance was " normal. There were no neck masses and the thyroid was not enlarged.  RESPIRATORY: Breathing pattern was normal and the chest moved symmetrically.   Lung sounds were equal bilaterally.  CARDIOVASCULAR: Heart rate and rhythm were normal.  S1 and S2 were normal and there were no extra sounds or murmurs. Peripheral pulses in arms and legs were normal.  Jugular venous pressure was normal.  There was no peripheral edema.  GASTROINTESTINAL: The abdomen was normal in contour.  Bowel sounds were present.   Palpation detected no tenderness, mass, or enlarged organs.   MUSCULOSKELETAL: Skeletal configuration was normal and muscle mass was normal for age. Joint appearance was overall normal.  LYMPHATIC: There were no enlarged nodes.  SKIN/HAIR/NAILS: Skin color was normal.  There were no abnormal skin lesions.  Hair and nails were normal.  NEUROLOGIC: The patient was alert and oriented to person, place, time, and circumstance. Speech was normal. Cranial nerves were normal. Motor strength was normal for age. The patient was normally coordinated.  PSYCHIATRIC:  Mood and affect were normal and the patient had normal recent and remote memory. The patient's judgment and insight were normal.  BREASTS: Well-healed lumpectomy scar on the left.  No masses, nipple discharge, chest wall lesions, etc.          Assessment Results 3/13/2019   Activities of Daily Living No help needed   Instrumental Activities of Daily Living No help needed   Mini Cog Total Score 4   Some recent data might be hidden     A Mini Cog score of 0-2 suggests the possibility of dementia, score of 3-5 suggests no dementia    Identified Health Risks:     The patient was provided with written information regarding signs of hearing loss.  The patient's PHQ-9 score is consistent with mild depression. She was provided with information regarding depression and was advised to schedule a follow up appointment in 6 months  to further address this issue.  Of note, her mental  health issues are related to her dear friends advancing cancer.  Patient's advanced directive was discussed and I am comfortable with the patient's wishes.

## 2021-06-24 NOTE — TELEPHONE ENCOUNTER
Last Prolia Injection: New  Next Prolia Injection: tbd  Ins being verified. To Latasha CAMPBELL for new PA. 3/13/19 [facility]  OLD  PA Auth: [id number] from 03/23/17 thru 03/23/18.

## 2021-06-25 NOTE — TELEPHONE ENCOUNTER
Refill Approved    Rx renewed per Medication Renewal Policy. Medication was last renewed on 11/10/2020.    Edna Damon, [facility] Triage/Med Refill 6/12/2021     Requested Prescriptions   Pending Prescriptions Disp Refills     levothyroxine (SYNTHROID, LEVOTHROID) 125 MCG tablet [Pharmacy Med Name: LEVOTHYROXINE 125 MCG TABLET] 45 tablet 1     Sig: TAKE 1 TABLET BY MOUTH EVERY OTHER DAY, ALTERNATIVE WITH 150MCG       Thyroid Hormones Protocol Passed - 6/10/2021  8:17 AM        Passed - Provider visit in past 12 months or next 3 months     Last office visit with prescriber/PCP: 12/20/2019 Heidi Valencia MD OR same dept: Visit date not found OR same specialty: 12/20/2019 Heidi Valencia MD  Last physical: 5/25/2021 Last MTM visit: Visit date not found   Next visit within 3 mo: Visit date not found  Next physical within 3 mo: Visit date not found  Prescriber OR PCP: Heidi Valencia MD  Last diagnosis associated with med order: 1. Acquired hypothyroidism  - levothyroxine (SYNTHROID, LEVOTHROID) 125 MCG tablet [Pharmacy Med Name: LEVOTHYROXINE 125 MCG TABLET]; TAKE 1 TABLET BY MOUTH EVERY OTHER DAY, ALTERNATIVE WITH 150MCG  Dispense: 45 tablet; Refill: 1    2. Hypothyroidism, unspecified type  - levothyroxine (SYNTHROID, LEVOTHROID) 150 MCG tablet [Pharmacy Med Name: LEVOTHYROXINE 150 MCG TABLET]; TAKE EVERY OTHER DAY AND ALTERNATE WITH 125 DOSE  Dispense: 45 tablet; Refill: 1    If protocol passes may refill for 12 months if within 3 months of last provider visit (or a total of 15 months).             Passed - TSH on file in past 12 months for patient age 12 & older     TSH   Date Value Ref Range Status   02/05/2021 0.66 0.30 - 5.00 uIU/mL Final                      levothyroxine (SYNTHROID, LEVOTHROID) 150 MCG tablet [Pharmacy Med Name: LEVOTHYROXINE 150 MCG TABLET] 45 tablet 1     Sig: TAKE EVERY OTHER DAY AND ALTERNATE WITH 125 DOSE       Thyroid Hormones Protocol Passed - 6/10/2021  8:17 AM         Passed - Provider visit in past 12 months or next 3 months     Last office visit with prescriber/PCP: 12/20/2019 Heidi Valencia MD OR same dept: Visit date not found OR same specialty: 12/20/2019 Heidi Valencia MD  Last physical: 5/25/2021 Last MTM visit: Visit date not found   Next visit within 3 mo: Visit date not found  Next physical within 3 mo: Visit date not found  Prescriber OR PCP: Heidi Valencia MD  Last diagnosis associated with med order: 1. Acquired hypothyroidism  - levothyroxine (SYNTHROID, LEVOTHROID) 125 MCG tablet [Pharmacy Med Name: LEVOTHYROXINE 125 MCG TABLET]; TAKE 1 TABLET BY MOUTH EVERY OTHER DAY, ALTERNATIVE WITH 150MCG  Dispense: 45 tablet; Refill: 1    2. Hypothyroidism, unspecified type  - levothyroxine (SYNTHROID, LEVOTHROID) 150 MCG tablet [Pharmacy Med Name: LEVOTHYROXINE 150 MCG TABLET]; TAKE EVERY OTHER DAY AND ALTERNATE WITH 125 DOSE  Dispense: 45 tablet; Refill: 1    If protocol passes may refill for 12 months if within 3 months of last provider visit (or a total of 15 months).             Passed - TSH on file in past 12 months for patient age 12 & older     TSH   Date Value Ref Range Status   02/05/2021 0.66 0.30 - 5.00 uIU/mL Final

## 2021-07-02 ENCOUNTER — TRANSCRIBE ORDERS (OUTPATIENT)
Dept: INTERNAL MEDICINE | Facility: CLINIC | Age: 66
End: 2021-07-02

## 2021-07-02 DIAGNOSIS — M81.0 AGE-RELATED OSTEOPOROSIS WITHOUT CURRENT PATHOLOGICAL FRACTURE: Primary | ICD-10-CM

## 2021-07-02 NOTE — PROGRESS NOTES
As part of the required manual data conversion process for integration, this encounter was created to document a CAM (Clinic Administered Medication) order. This information was copied from the [facility] patient's chart to the [facility] patient chart.     Annika White PharmD  July 2, 2021

## 2021-07-03 NOTE — ADDENDUM NOTE
Addendum Note by Nirmal Bruno MLT at 10/30/2020  1:40 PM     Author: Nirmal Bruno MLT Service: -- Author Type:     Filed: 10/30/2020  4:47 PM Encounter Date: 10/30/2020 Status: Signed    : Nirmal Bruno MLT ()    Addended by: NIRMAL BRUNO on: 10/30/2020 04:47 PM        Modules accepted: Orders

## 2021-07-03 NOTE — ADDENDUM NOTE
Addendum Note by Uzair Valencia MD at 11/1/2019  7:40 AM     Author: Uzair Valencia MD Service: -- Author Type: Physician    Filed: 11/1/2019  4:53 PM Encounter Date: 11/1/2019 Status: Signed    : Uzair Valencia MD (Physician)    Addended by: UZAIR VALENCIA on: 11/1/2019 04:53 PM        Modules accepted: Orders

## 2021-07-06 VITALS
OXYGEN SATURATION: 98 % | WEIGHT: 163.44 LBS | DIASTOLIC BLOOD PRESSURE: 64 MMHG | HEART RATE: 82 BPM | SYSTOLIC BLOOD PRESSURE: 118 MMHG | RESPIRATION RATE: 18 BRPM | BODY MASS INDEX: 28.96 KG/M2 | HEIGHT: 63 IN

## 2021-07-06 ASSESSMENT — PATIENT HEALTH QUESTIONNAIRE - PHQ9: SUM OF ALL RESPONSES TO PHQ QUESTIONS 1-9: 3

## 2021-07-09 ENCOUNTER — COMMUNICATION - HEALTHEAST (OUTPATIENT)
Dept: INTERNAL MEDICINE | Facility: CLINIC | Age: 66
End: 2021-07-09

## 2021-07-09 DIAGNOSIS — I10 ESSENTIAL HYPERTENSION: ICD-10-CM

## 2021-07-09 NOTE — TELEPHONE ENCOUNTER
Telephone Encounter by Pedrito Garcia, RN at 7/9/2021  8:00 AM     Author: Pedrito Garcia, RN Service: -- Author Type: Registered Nurse    Filed: 7/9/2021  8:01 AM Encounter Date: 7/8/2021 Status: Signed    : Pedrito Garcia, RN (Registered Nurse)       Refill Approved    Rx renewed per Medication Renewal Policy. Medication was last renewed on 5/27/20.    Pedrito Garcia, [facility] [place] Connection Triage/Med Refill 7/9/2021     Requested Prescriptions   Pending Prescriptions Disp Refills   ? amLODIPine (NORVASC) 5 MG tablet [Pharmacy Med Name: AMLODIPINE BESYLATE 5 MG TAB] 90 tablet 2     Sig: TAKE 1 TABLET BY MOUTH EVERY DAY       Calcium-Channel Blockers Protocol Passed - 7/8/2021  5:47 PM        Passed - PCP or prescribing provider visit in past 12 months or next 3 months     Last office visit with prescriber/PCP: 12/20/2019 Heidi Valencia MD OR same dept: Visit date not found OR same specialty: 12/20/2019 Heidi Valencia MD  Last physical: 5/25/2021 Last MTM visit: Visit date not found   Next visit within 3 mo: Visit date not found  Next physical within 3 mo: Visit date not found  Prescriber OR PCP: Heidi Valencia MD  Last diagnosis associated with med order: 1. Hypertension  - amLODIPine (NORVASC) 5 MG tablet [Pharmacy Med Name: AMLODIPINE BESYLATE 5 MG TAB]; TAKE 1 TABLET BY MOUTH EVERY DAY  Dispense: 90 tablet; Refill: 2    If protocol passes may refill for 12 months if within 3 months of last provider visit (or a total of 15 months).             Passed - Blood pressure filed in past 12 months     BP Readings from Last 1 Encounters:   05/25/21 118/64

## 2021-07-13 ENCOUNTER — TELEPHONE (OUTPATIENT)
Dept: INTERNAL MEDICINE | Facility: CLINIC | Age: 66
End: 2021-07-13

## 2021-07-13 ENCOUNTER — TELEPHONE (OUTPATIENT)
Dept: INTERNAL MEDICINE | Facility: CLINIC | Age: 66
End: 2021-07-13
Payer: COMMERCIAL

## 2021-07-13 NOTE — TELEPHONE ENCOUNTER
"Prolia Injection Phone Screen      Screening questions have been asked 2-3 days prior to administration visit for Prolia. If any questions are answered with \"Yes,\" this phone encounter were will routed to ordering provider for further evaluation.     1.  When was the last injection?  1.12.21    2.  Has insurance for this injection been verified?  Yes    3.  Did you experience any new onset achiness or rashes that lasted for over a month with your previous Prolia injection?   No    4.  Do you have a fever over 101?F or a new deep cough that is unusual for you today? No    5.  Have you started any new medications in the last 6 months that you were told could affect your immune system? These may have been prescribed by oncologist, transplant, rheumatology, or dermatology.   No    6.  In the last 6 months have you have gastric bypass or parathyroid surgery?   No    7.  Do you plan dental work requiring drilling into the bone such as implants/extractions or oral surgery in the next 2-3 months?   No    Patient informed if symptoms discussed above present prior to their administration appointment, they are to notify clinic immediately.     Kathy Read            "

## 2021-07-14 ENCOUNTER — ALLIED HEALTH/NURSE VISIT (OUTPATIENT)
Dept: FAMILY MEDICINE | Facility: CLINIC | Age: 66
End: 2021-07-14
Payer: COMMERCIAL

## 2021-07-14 DIAGNOSIS — M19.90 OSTEOARTHRITIS: Primary | ICD-10-CM

## 2021-07-14 PROCEDURE — 99207 PR NO CHARGE NURSE ONLY: CPT

## 2021-07-14 PROCEDURE — 96372 THER/PROPH/DIAG INJ SC/IM: CPT | Performed by: INTERNAL MEDICINE

## 2021-08-06 NOTE — PATIENT INSTRUCTIONS - HE
Patient Instructions by Heidi Valencia MD at 5/19/2020 11:00 AM     Author: Heidi Valencia MD Service: -- Author Type: Physician    Filed: 5/19/2020 11:20 AM Encounter Date: 5/19/2020 Status: Signed    : Heidi Valencia MD (Physician)         Patient Education   Signs of Hearing Loss  Hearing loss is a problem shared by many people. In fact, it is one of the most common health conditions, particularly as people age. Most people over age 65 have some hearing loss, and by age 80, almost everyone does. Because hearing loss usually occurs slowly over the years, you may not realize your hearing ability has gotten worse.       Have your hearing checked  Contact your [facility] care provider if you:    Have to strain to hear normal conversation.    Have to watch other peoples faces very carefully to follow what theyre saying.    Need to ask people to repeat what theyve said.    Often misunderstand what people are saying.    Turn the volume of the television or radio up so high that others complain.    Feel that people are mumbling when theyre talking to you.    Find that the effort to hear leaves you feeling tired and irritated.    Notice, when using the phone, that you hear better with 1 ear than the other.    2356-3664 [employer]. [address] [place], [place] [address]. All rights reserved. This information is not intended as a substitute for professional medical care. Always follow your healthcare professional's instructions.         Patient Education   Your Health Risk Assessment indicates you feel you are not in good emotional health.    Recreation   Recreation is not limited to sports and team events. It includes any activity that provides relaxation, interest, enjoyment, and exercise. Recreation provides an outlet for physical, mental, and social energy. It can give a sense of worth and achievement. It can help you stay healthy.    Mental Exercise and Social Involvement  Mental and  emotional health is as important as physical health. Keep in touch with friends and family. Stay as active as possible. Continue to learn and challenge yourself.   Things you can do to stay mentally active are:    Learn something new, like a foreign language or musical instrument.     Play SCRABBLE or do crossword puzzles. If you cannot find people to play these games with you at home, you can play them with others on your computer through the Internet.     Join a games club--anything from card games to chess or checkers or lawn bowling.     Start a new hobby.     Go back to school.     Volunteer.     Read.     Keep up with world events.         Advance Directive  Patients advance directive was discussed and I am comfortable with the patients wishes.  Patient Education   Personalized Prevention Plan  You are due for the preventive services outlined below.  Your care team is available to assist you in scheduling these services.  If you have already completed any of these items, please share that information with your care team to update in your medical record.  Health Maintenance   Topic Date Due   ? DEPRESSION ACTION PLAN  1955   ? HEPATITIS C SCREENING  1955   ? DXA SCAN  11/09/2018   ? MEDICARE ANNUAL WELLNESS VISIT  03/13/2020   ? FALL RISK ASSESSMENT  09/24/2020   ? MAMMOGRAM  05/02/2021   ? COLORECTAL CANCER SCREENING  03/20/2023   ? ADVANCE CARE PLANNING  03/13/2024   ? LIPID  09/24/2024   ? TD 18+ HE  09/24/2029   ? PNEUMOCOCCAL IMMUNIZATION 65+ LOW/MEDIUM RISK  Completed   ? INFLUENZA VACCINE RULE BASED  Completed   ? ZOSTER VACCINES  Completed

## 2021-08-17 NOTE — PROGRESS NOTES
CANCER SURVIVORSHIP VISIT   Aug 19, 2021    Care Team   Primary Care Provider Heidi Valencia   Surgeon  Boyd Jensen MD   Radiation Oncologist Shelton Sandra MD   Medical Oncologist  Johnathan Herrera MD       REASON FOR VISIT: survivorship visit for history of breast cancer    CANCER HISTORY AND TREATMENT:    History of stage IIA breast cancer of the left breast, ER/VA positive, HER-2 negative,  Oncotype 16 intermediate risk  *Diagnosis: 07/13/2007. She was 52 years and perimenopausal  *Surgery: 08/13/2007 Left lumpectomy and [place] lymph node biopsy. Stage IIA, T2 N0 M0, ER positive, VA positive and HER-2 negative invasive ductal carcinoma of the left breast. Oncotype Score: 16  *Chemotherapy: 4 cycles of docetaxel/cyclophosphamide completed 11/2007  *Radiation: She received 5040 centigray in 28 fractions to the left breast with an additional 1200 centigray boost to the lumpectomy cavity completed 01/2008  *Endocrine therapy: She completed 5 years of therapy 8/2013. Tamoxifen (one year)  Arimidex (Anastrozole) (08/2008-08/2013)  *Genetic testing: On 3/06/2013, she had BRACAnalysis and BRACAnalysis Rearrangement Testing (LAURITA) from [facility].  Test results were negative with no mutations found in BRCA1 and BRCA2 genes. In 2015 she had additional PALB2 gene sequence and deletion/duplication test which was negative.     INTERVAL HISTORY:   Cinthya is doing overall very well. She has residual issues with balance. This has improved over time. No neuropathy. She attributes it to age and body habitus.     She occasionally has left sided breast pain secondary to a seroma. No current breast concerns. She is interested in getting fitted with a bra that may help with her breast asymmetry and posture.     Due to breast asymmetry she find back posture to be a challenge.     Retired a few years ago. Energy good. Not sexual active and fine with that. Has history of depression and anxiety. On bupropion and Buspar. No current  concerns.     She rode her bike today, 12 miles.     Otherwise, ROS negative for: fevers, headaches, visual changes, new sites of pain, shortness of breath, cough, chest pain, abdominal pain, nausea, vomiting, abnormal vaginal bleeding (she has had hysterectomy), or leg swelling.      Current Outpatient Medications   Medication Sig Dispense Refill     acetaminophen (TYLENOL) 325 MG tablet Take 3 tablets (975 mg) by mouth every 6 hours as needed for mild pain 30 tablet 1     amLODIPine (NORVASC) 5 MG tablet Take 5 mg by mouth daily  11     aspirin 81 MG tablet Take 81 mg by mouth daily       atorvastatin (LIPITOR) 40 MG tablet Take 40 mg by mouth daily.        buPROPion (BUPROBAN) 150 MG 12 hr tablet Take 150 mg by mouth once        busPIRone (BUSPAR) 10 MG tablet Take 10 mg by mouth daily.       Calcium Citrate 200 MG TABS Take 1 tablet by mouth daily       clobetasol (TEMOVATE) 0.05 % external ointment Apply in thin layer to vulva as instructed, twice weekly.  Do not apply to face. 45 g 3     Denosumab (PROLIA SC)        hydrochlorothiazide (HYDRODIURIL) 25 MG tablet Take 1 tablet by mouth daily.       levothyroxine (SYNTHROID, LEVOTHROID) 125 MCG tablet 125 mcg daily Alternate 150 with 125 mcg Every other day       lisinopril (ZESTRIL) 2.5 MG tablet Take 2.5 mg by mouth daily       magnesium oxide 200 MG TABS Take by mouth daily       Vitamin D, Cholecalciferol, 1000 UNITS TABS Take 2,000 Units by mouth daily            Allergies   Allergen Reactions     Diclofenac Unknown     Elevation of LFTs.  Elevation of LFTs.     Family history:  Mother with DCIS in her 60's.  Maternal aunt with lung cancer in her early 50's.  Her maternal ethnicity is [place] and Ashkenazi [place]. Her paternal ethnicity is [place], [place] and Ashkenazi [place].      Social history:  Retired- from [employer]. Lives in [facility] [place]. She rode her bike today. Healthy diet. . Has 2 children. And 4 grandchildren. Drinks 1 drink per  day at most. Smoked for 6 months in 1975. No drug use.     PHYSICAL EXAMINATION  /65 (BP Location: Left arm, Patient Position: Sitting, Cuff Size: Adult Regular)   Pulse 88   Temp 97.7  F (36.5  C) (Oral)   Resp 16   Wt 76.6 kg (168 lb 12.8 oz)   LMP 06/07/2007   SpO2 98%   BMI 29.48 kg/m     Wt Readings from Last 4 Encounters:   08/19/21 76.6 kg (168 lb 12.8 oz)   05/25/21 74.1 kg (163 lb 7 oz)   03/05/20 79.8 kg (176 lb)   12/26/19 79.5 kg (175 lb 3.2 oz)   Constitutional: Alert, oriented female in no visible distress.  Eyes: PERRL. Anicteric sclerae.  ENT/Mouth: OM moist and pink without lesions or thrush.  CV: RRR, no murmurs appreciated.  Resp: CTAB throughout  Abdomen: Soft, non-tender, non-distended. Bowel sounds present. No masses appreciated. No organomegaly noted.  Extremities: No lower extremity edema appreciated.  Skin: Warm, dry. No bruising or petechiae noted.  Lymph: No cervical, supraclavicular, or axillary lymphadenopathy appreciated.   Neuro: CN II-XII grossly intact.  Breast: She has had a lumpectomy on the left. She has breast asymmetry and nipple inversion bilaterally >R (not new). No breast masses. She has firmness above the scar in the left breast in the 12:00 position.     IMPRESSION/PLAN:     History of stage IIA breast cancer of the left breast, ER/AR positive, HER-2 negative, Oncotype 16 intermediate risk  Her treatment included left lumpectomy, 4 cycles of docetaxel/cyclophosphamide, radiation therapy, and 5 years of endocrine therapy.   Genetic testing was performed in 2013 and 2015 and negative- I will talk to  to see if this needs to be repeated  She is 14 years from diagnosis.   Annual mammogram and visit in 1 year    Osteopenia  She has been treated in the past with oral bisphosphonates and is now on Prolia. The plan it to switch back to oral bisphosphonates  Most recent DEXA was 4/7/2021 and lowest T-score was -1.5.   Ensured adequate calcium, vitamin D intake, and  regular weightbearing exercise with a recheck in 2 years  Managed by her PCP    Potential late effects related to surgery/radiation:  -Risk of lymphedema: Can occur at any time.If she notices any swelling in her arm, she should be offered a referral to lymphedema physical therapy.     Potential late effects related to chemotherapy:  -Risk of hematologic malignancy. Rare risk of developing secondary hematologic malignancy which typical occurs in first 10 years.    Potential late effects related to radiation:  -Radiation side effects: Radiation would have included the heart, lung, bone, skin on the left. She should seek medical attention if she notices any new skin lesions in the area of radiation. The bones are at risk for fractures, so she should inform a provider with any new pains in these areas. The lungs are at risk for fibrosis, restrictive and/or obstructive lung disease.  If she should develop any shortness of breath, hemoptysis, cough, would consider further evaluation with CT or X-ray. No routine screening for potential lung complications unless symptomatic.    General late effects screenings and recommendations  -Cancer screening. She should undergo routine screening for women in her age group. Due for colonoscopy 2023  -Healthy lifestyle. Recommend a healthy weight with a BMI between 20-25. She exercises at least 150 minutes weekly at moderate intensity. She should see the eye doctor every 1-2 years, and dentist every 6 months for cleanings. She does not use any tobacco. She should minimize alcohol intake. If continuing to drink, should follow CDC recommendations for no more than 1 alcoholic drink/day for women.    Gave resources for our Thrive Cancer Survivorship class series and mailings list for educational opportunities. [email or web address]    Cancer treatment summary was sent electronically to the patient and her PCP.      The total time of this  encounter amounted to 60 minutes.This time included time spent with the patient, prep work, ordering tests, and performing post visit documentation.    JOSE ANGEL Gallegos, PA-C  [facility] Surviv[facility]

## 2021-08-19 ENCOUNTER — APPOINTMENT (OUTPATIENT)
Dept: LAB | Facility: CLINIC | Age: 66
End: 2021-08-19
Attending: INTERNAL MEDICINE
Payer: COMMERCIAL

## 2021-08-19 ENCOUNTER — ONCOLOGY SURVIVORSHIP VISIT (OUTPATIENT)
Dept: ONCOLOGY | Facility: CLINIC | Age: 66
End: 2021-08-19
Attending: PHYSICIAN ASSISTANT
Payer: COMMERCIAL

## 2021-08-19 VITALS
HEART RATE: 88 BPM | RESPIRATION RATE: 16 BRPM | WEIGHT: 168.8 LBS | DIASTOLIC BLOOD PRESSURE: 65 MMHG | OXYGEN SATURATION: 98 % | BODY MASS INDEX: 29.48 KG/M2 | SYSTOLIC BLOOD PRESSURE: 117 MMHG | TEMPERATURE: 97.7 F

## 2021-08-19 DIAGNOSIS — Z85.3 HX OF BREAST CANCER: Primary | ICD-10-CM

## 2021-08-19 DIAGNOSIS — Z12.31 ENCOUNTER FOR SCREENING MAMMOGRAM FOR MALIGNANT NEOPLASM OF BREAST: ICD-10-CM

## 2021-08-19 PROCEDURE — G0463 HOSPITAL OUTPT CLINIC VISIT: HCPCS

## 2021-08-19 PROCEDURE — 99215 OFFICE O/P EST HI 40 MIN: CPT | Performed by: PHYSICIAN ASSISTANT

## 2021-08-19 RX ORDER — KETOCONAZOLE 20 MG/G
CREAM TOPICAL
COMMUNITY
Start: 2021-05-26 | End: 2022-11-03

## 2021-08-19 RX ORDER — LEVOTHYROXINE SODIUM 150 UG/1
TABLET ORAL
COMMUNITY
Start: 2021-06-12 | End: 2021-12-15

## 2021-08-19 RX ORDER — BUPROPION HYDROCHLORIDE 150 MG/1
TABLET ORAL
COMMUNITY
Start: 2021-07-01 | End: 2021-12-15

## 2021-08-19 RX ORDER — BUSPIRONE HYDROCHLORIDE 15 MG/1
TABLET ORAL DAILY
COMMUNITY
Start: 2021-07-08 | End: 2022-04-04

## 2021-08-19 RX ORDER — HYDROCORTISONE 2.5 %
CREAM (GRAM) TOPICAL
COMMUNITY
Start: 2021-05-26 | End: 2022-11-03

## 2021-08-19 ASSESSMENT — PAIN SCALES - GENERAL: PAINLEVEL: NO PAIN (0)

## 2021-08-19 NOTE — LETTER
8/19/2021         RE: Cinthya Waite  [address]  [place] [facility] [address]        Dear Colleague,    Thank you for referring your patient, Cinthya Waite, to the [facility]. Please see a copy of my visit note below.    CANCER SURVIVORSHIP VISIT   Aug 19, 2021    Care Team   Primary Care Provider Heidi Valencia   Surgeon  Boyd Jensen MD   Radiation Oncologist Shelton Sandra MD   Medical Oncologist  Johnathan Herrera MD       REASON FOR VISIT: survivorship visit for history of breast cancer    CANCER HISTORY AND TREATMENT:    History of stage IIA breast cancer of the left breast, ER/[place] positive, HER-2 negative,  Oncotype 16 intermediate risk  *Diagnosis: 07/13/2007. She was 52 years and perimenopausal  *Surgery: 08/13/2007 Left lumpectomy and [place] lymph node biopsy. Stage IIA, T2 N0 M0, ER positive, [place] positive and HER-2 negative invasive ductal carcinoma of the left breast. Oncotype Score: 16  *Chemotherapy: 4 cycles of docetaxel/cyclophosphamide completed 11/2007  *Radiation: She received 5040 centigray in 28 fractions to the left breast with an additional 1200 centigray boost to the lumpectomy cavity completed 01/2008  *Endocrine therapy: She completed 5 years of therapy 8/2013. Tamoxifen (one year)  Arimidex (Anastrozole) (08/2008-08/2013)  *Genetic testing: On 3/06/2013, she had BRACAnalysis and BRACAnalysis Rearrangement Testing (LAURITA) from [employer].  Test results were negative with no mutations found in BRCA1 and BRCA2 genes. In 2015 she had additional PALB2 gene sequence and deletion/duplication test which was negative.     INTERVAL HISTORY:   Cinthya is doing overall very well. She has residual issues with balance. This has improved over time. No neuropathy. She attributes it to age and body habitus.     She occasionally has left sided breast pain secondary to a seroma. No current breast concerns. She is interested in getting fitted with a bra  that may help with her breast asymmetry and posture.     Due to breast asymmetry she find back posture to be a challenge.     Retired a few years ago. Energy good. Not sexual active and fine with that. Has history of depression and anxiety. On bupropion and Buspar. No current concerns.     She rode her bike today, 12 miles.     Otherwise, ROS negative for: fevers, headaches, visual changes, new sites of pain, shortness of breath, cough, chest pain, abdominal pain, nausea, vomiting, abnormal vaginal bleeding (she has had hysterectomy), or leg swelling.      Current Outpatient Medications   Medication Sig Dispense Refill     acetaminophen (TYLENOL) 325 MG tablet Take 3 tablets (975 mg) by mouth every 6 hours as needed for mild pain 30 tablet 1     amLODIPine (NORVASC) 5 MG tablet Take 5 mg by mouth daily  11     aspirin 81 MG tablet Take 81 mg by mouth daily       atorvastatin (LIPITOR) 40 MG tablet Take 40 mg by mouth daily.        buPROPion (BUPROBAN) 150 MG 12 hr tablet Take 150 mg by mouth once        busPIRone (BUSPAR) 10 MG tablet Take 10 mg by mouth daily.       Calcium Citrate 200 MG TABS Take 1 tablet by mouth daily       clobetasol (TEMOVATE) 0.05 % external ointment Apply in thin layer to vulva as instructed, twice weekly.  Do not apply to face. 45 g 3     Denosumab (PROLIA SC)        hydrochlorothiazide (HYDRODIURIL) 25 MG tablet Take 1 tablet by mouth daily.       levothyroxine (SYNTHROID, LEVOTHROID) 125 MCG tablet 125 mcg daily Alternate 150 with 125 mcg Every other day       lisinopril (ZESTRIL) 2.5 MG tablet Take 2.5 mg by mouth daily       magnesium oxide 200 MG TABS Take by mouth daily       Vitamin D, Cholecalciferol, 1000 UNITS TABS Take 2,000 Units by mouth daily            Allergies   Allergen Reactions     Diclofenac Unknown     Elevation of LFTs.  Elevation of LFTs.     Family history:  Mother with DCIS in her 60's.  Maternal aunt with lung cancer in her early 50's.  Her maternal ethnicity is  [place]onese and Ashkenazi Zoroastrianism. Her paternal ethnicity is [place], Swedish and Ashkenazi Zoroastrianism.      Social history:  Retired- from [employer]. Lives in [place]. She rode her bike today. Healthy diet. . Has 2 children. And 4 grandchildren. Drinks 1 drink per day at most. Smoked for 6 months in 1975. No drug use.     PHYSICAL EXAMINATION  /65 (BP Location: Left arm, Patient Position: Sitting, Cuff Size: Adult Regular)   Pulse 88   Temp 97.7  F (36.5  C) (Oral)   Resp 16   Wt 76.6 kg (168 lb 12.8 oz)   LMP 06/07/2007   SpO2 98%   BMI 29.48 kg/m     Wt Readings from Last 4 Encounters:   08/19/21 76.6 kg (168 lb 12.8 oz)   05/25/21 74.1 kg (163 lb 7 oz)   03/05/20 79.8 kg (176 lb)   12/26/19 79.5 kg (175 lb 3.2 oz)   Constitutional: Alert, oriented female in no visible distress.  Eyes: PERRL. Anicteric sclerae.  ENT/Mouth: OM moist and pink without lesions or thrush.  CV: RRR, no murmurs appreciated.  Resp: CTAB throughout  Abdomen: Soft, non-tender, non-distended. Bowel sounds present. No masses appreciated. No organomegaly noted.  Extremities: No lower extremity edema appreciated.  Skin: Warm, dry. No bruising or petechiae noted.  Lymph: No cervical, supraclavicular, or axillary lymphadenopathy appreciated.   Neuro: CN II-XII grossly intact.  Breast: She has had a lumpectomy on the left. She has breast asymmetry and nipple inversion bilaterally >R (not new). No breast masses. She has firmness above the scar in the left breast in the 12:00 position.     IMPRESSION/PLAN:     History of stage IIA breast cancer of the left breast, ER/[place] positive, HER-2 negative, Oncotype 16 intermediate risk  Her treatment included left lumpectomy, 4 cycles of docetaxel/cyclophosphamide, radiation therapy, and 5 years of endocrine therapy.   Genetic testing was performed in 2013 and 2015 and negative- I will talk to  to see if this needs to be repeated  She is 14 years from diagnosis.   Annual mammogram  and visit in 1 year    Osteopenia  She has been treated in the past with oral bisphosphonates and is now on Prolia. The plan it to switch back to oral bisphosphonates  Most recent DEXA was 4/7/2021 and lowest T-score was -1.5.   Ensured adequate calcium, vitamin D intake, and regular weightbearing exercise with a recheck in 2 years  Managed by her PCP    Potential late effects related to surgery/radiation:  -Risk of lymphedema: Can occur at any time.If she notices any swelling in her arm, she should be offered a referral to lymphedema physical therapy.     Potential late effects related to chemotherapy:  -Risk of hematologic malignancy. Rare risk of developing secondary hematologic malignancy which typical occurs in first 10 years.    Potential late effects related to radiation:  -Radiation side effects: Radiation would have included the heart, lung, bone, skin on the left. She should seek medical attention if she notices any new skin lesions in the area of radiation. The bones are at risk for fractures, so she should inform a provider with any new pains in these areas. The lungs are at risk for fibrosis, restrictive and/or obstructive lung disease.  If she should develop any shortness of breath, hemoptysis, cough, would consider further evaluation with CT or X-ray. No routine screening for potential lung complications unless symptomatic.    General late effects screenings and recommendations  -Cancer screening. She should undergo routine screening for women in her age group. Due for colonoscopy 2023  -Healthy lifestyle. Recommend a healthy weight with a BMI between 20-25. She exercises at least 150 minutes weekly at moderate intensity. She should see the eye doctor every 1-2 years, and dentist every 6 months for cleanings. She does not use any tobacco. She should minimize alcohol intake. If continuing to drink, should follow CDC recommendations for no more than 1 alcoholic drink/day for women.    Gave resources for  our Thrive Cancer Survivorship class series and mailings list for educational opportunities. [email or web address]    Cancer treatment summary was sent electronically to the patient and her PCP.      The total time of this encounter amounted to 60 minutes.This time included time spent with the patient, prep work, ordering tests, and performing post visit documentation.    JOSE ANGEL Gallegos, DOMINIQUE  [facility]      Again, thank you for allowing me to participate in the care of your patient.        Sincerely,        Eloise Santana PA-C

## 2021-08-19 NOTE — PATIENT INSTRUCTIONS
THRIVE Cancer Survivorship Class Series    What is the Thrive Series?    The Thrive Series is a free, weekly (4-6 weeks) virtual series that provides those interested in learning more about cancer survivorship with education and resources on a variety of topics straight from local experts.    Choose to register for the sessions you'd like to attend live online. Sessions will be recorded so you can watch again or catch up on a session you missed. It's free to attend, but registration is required.    Who is a Cancer Survivor?    You are a cancer survivor from the point of a cancer diagnosis including throughout treatment and living with cancer and throughout remission.    [email or web address]

## 2021-09-18 ENCOUNTER — HEALTH MAINTENANCE LETTER (OUTPATIENT)
Age: 66
End: 2021-09-18

## 2021-10-01 ENCOUNTER — ALLIED HEALTH/NURSE VISIT (OUTPATIENT)
Dept: FAMILY MEDICINE | Facility: CLINIC | Age: 66
End: 2021-10-01
Payer: COMMERCIAL

## 2021-10-01 ENCOUNTER — LAB (OUTPATIENT)
Dept: LAB | Facility: CLINIC | Age: 66
End: 2021-10-01

## 2021-10-01 DIAGNOSIS — E78.00 PURE HYPERCHOLESTEROLEMIA: ICD-10-CM

## 2021-10-01 DIAGNOSIS — Z23 NEEDS FLU SHOT: Primary | ICD-10-CM

## 2021-10-01 DIAGNOSIS — R53.83 FATIGUE, UNSPECIFIED TYPE: ICD-10-CM

## 2021-10-01 LAB
CHOLEST SERPL-MCNC: 189 MG/DL
FASTING STATUS PATIENT QL REPORTED: YES
HDLC SERPL-MCNC: 59 MG/DL
LDLC SERPL CALC-MCNC: 111 MG/DL
TRIGL SERPL-MCNC: 94 MG/DL
TSH SERPL DL<=0.005 MIU/L-ACNC: 1.51 UIU/ML (ref 0.3–5)

## 2021-10-01 PROCEDURE — G0008 ADMIN INFLUENZA VIRUS VAC: HCPCS

## 2021-10-01 PROCEDURE — 36415 COLL VENOUS BLD VENIPUNCTURE: CPT

## 2021-10-01 PROCEDURE — 99207 PR NO CHARGE NURSE ONLY: CPT

## 2021-10-01 PROCEDURE — 84443 ASSAY THYROID STIM HORMONE: CPT

## 2021-10-01 PROCEDURE — 90662 IIV NO PRSV INCREASED AG IM: CPT

## 2021-10-01 PROCEDURE — 80061 LIPID PANEL: CPT

## 2021-10-11 ENCOUNTER — VIRTUAL VISIT (OUTPATIENT)
Dept: ONCOLOGY | Facility: CLINIC | Age: 66
End: 2021-10-11
Attending: PHYSICIAN ASSISTANT
Payer: COMMERCIAL

## 2021-10-11 DIAGNOSIS — Z85.3 HX OF BREAST CANCER: Primary | ICD-10-CM

## 2021-10-11 DIAGNOSIS — Z80.3 FAMILY HISTORY OF MALIGNANT NEOPLASM OF BREAST: ICD-10-CM

## 2021-10-11 PROCEDURE — 96040 HC GENETIC COUNSELING, EACH 30 MINUTES: CPT | Mod: GT,95 | Performed by: GENETIC COUNSELOR, MS

## 2021-10-11 NOTE — PATIENT INSTRUCTIONS
Assessing Cancer Risk  Only about 5-10% of cancers are thought to be due to an inherited cancer susceptibility gene.    These families often have:    Several people with the same or related types of cancer    Cancers diagnosed at a young age (before age 50)    Individuals with more than one primary cancer    Multiple generations of the family affected with cancer    Some people may be candidates for genetic testing of more than one gene.  For these families, genetic testing using a cancer panel may be offered.  These panels will test different genes known to increase the risk for breast, ovarian, uterine, and/or other cancers. All of the genes discussed below have published clinical management guidelines for individuals who are found to carry a mutation. The purpose of this handout is to serve as a brief summary of the genes analyzed by the panels used to inquire about hereditary breast and gynecologic cancer:  RUKHSANA, BRCA1, BRCA2, BRIP1, CDH1, CHEK2, MLH1, MSH2, MSH6, PMS2, EPCAM, PTEN, PALB2, RAD51C, RAD51D, and TP53.  ______________________________________________________________________________  Hereditary Breast and Ovarian Cancer Syndrome   (BRCA1 and BRCA2)  A single mutation in one of the copies of BRCA1 or BRCA2 increases the risk for breast and ovarian cancer, among others.  The risk for pancreatic cancer and melanoma may also be slightly increased in some families.  The chart below shows the chance that someone with a BRCA mutation would develop cancer in his or her lifetime1,2,3,4.        A person s ethnic background is also important to consider, as individuals of Ashkenazi Sabianist ancestry have a higher chance of having a BRCA gene mutation.  There are three BRCA mutations that occur more frequently in this population.    Wise Syndrome   (MLH1, MSH2, MSH6, PMS2, and EPCAM)  Currently five genes are known to cause Wise Syndrome: MLH1, MSH2, MSH6, PMS2, and EPCAM.  A single mutation in one of the  Wise Syndrome genes increases the risk for colon, endometrial, ovarian, and stomach cancers.  Other cancers that occur less commonly in Wise Syndrome include urinary tract, skin, and brain cancers.  The chart below shows the chance that a person with Wise syndrome would develop cancer in his or her lifetime5.      *Cancer risk varies depending on Wise syndrome gene found    Cowden Syndrome   (PTEN)  Cowden syndrome is a hereditary condition that increases the risk for breast, thyroid, endometrial, colon, and kidney cancer.  Cowden syndrome is caused by a mutation in the PTEN gene.  A single mutation in one of the copies of PTEN causes Cowden syndrome and increases cancer risk.  The chart below shows the chance that someone with a PTEN mutation would develop cancer in their lifetime6,7.  Other benign features seen in some individuals with Cowden syndrome include benign skin lesions (facial papules, keratoses, lipomas), learning disability, autism, thyroid nodules, colon polyps, and larger head size.      *One recent study found breast cancer risk to be increased to 85%    Li-Fraumeni Syndrome   (TP53)  Li-Fraumeni Syndrome (LFS) is a cancer predisposition syndrome caused by a mutation in the TP53 gene. A single mutation in one of the copies of TP53 increases the risk for multiple cancers. Individuals with LFS are at an increased risk for developing cancer at a young age. The lifetime risk for development of a LFS-associated cancer is 50% by age 30 and 90% by age 60.   Core Cancers: Sarcomas, Breast, Brain, Lung, Leukemias/Lymphomas, Adrenocortical carcinomas  Other Cancers: Gastrointestinal, Thyroid, Skin, Genitourinary    Hereditary Diffuse Gastric Cancer   (CDH1)  Currently, one gene is known to cause hereditary diffuse gastric cancer (HDGC): CDH1.  Individuals with HDGC are at increased risk for diffuse gastric cancer and lobular breast cancer. Of people diagnosed with HDGC, 30-50% have a mutation in the CDH1  gene.  This suggests there are likely other genes that may cause HDGC that have not been identified yet.      Lifetime Cancer Risks    General Population HDGC    Diffuse Gastric  <1% ~80%   Breast 12% 39-52%         Additional Genes  RUKHSANA  RUKHSANA is a moderate-risk breast cancer gene. Women who have a mutation in RUKHSANA can have between a 2-4 fold increased risk for breast cancer compared to the general population8. RUKHSANA mutations have also been associated with increased risk for pancreatic cancer, however an estimate of this cancer risk is not well understood9. Individuals who inherit two RUKHSANA mutations have a condition called ataxia-telangiectasia (AT).  This rare autosomal recessive condition affects the nervous system and immune system, and is associated with progressive cerebellar ataxia beginning in childhood.  Individuals with ataxia-telangiectasia often have a weakened immune system and have an increased risk for childhood cancers.    PALB2  Mutations in PALB2 have been shown to increase the risk of breast cancer up to 33-58% in some families; where individuals fall within this risk range is dependent upon family [id number]. PALB2 mutations have also been associated with increased risk for pancreatic cancer, although this risk has not been quantified yet.  Individuals who inherit two PALB2 mutations--one from their mother and one from their father--have a condition called Fanconi Anemia.  This rare autosomal recessive condition is associated with short stature, developmental delay, bone marrow failure, and increased risk for childhood cancers.    CHEK2   CHEK2 is a moderate-risk breast cancer gene.  Women who have a mutation in CHEK2 have around a 2-fold increased risk for breast cancer compared to the general population, and this risk may be higher depending upon family history.11,12,13 Mutations in CHEK2 have also been shown to increase the risk of a number of other cancers, including colon and prostate, however  these cancer risks are currently not well understood.    BRIP1, RAD51C and RAD51D  Mutations in BRIP1, RAD51C, and RAD51D have been shown to increase the risk of ovarian cancer and possibly female breast cancer as well14,15 .       Lifetime Cancer Risk    General Population BRIP1 RAD51C RAD51D   Ovarian 1-2% ~5-8% ~5-9% ~7-15%           Inheritance  All of the cancer syndromes reviewed above are inherited in an autosomal dominant pattern.  This means that if a parent has a mutation, each of his or her children will have a 50% chance of inheriting that same mutation.  Therefore, each child--male or female--would have a 50% chance of being at increased risk for developing cancer.      Image obtained from Genetics Home Reference, 2013     Mutations in some genes can occur de bryon, which means that a person s mutation occurred for the first time in them and was not inherited from a parent.  Now that they have the mutation, however, it can be passed on to future generations.    Genetic Testing  Genetic testing involves a blood test and will look at the genetic information in the RUKHSANA, BRCA1, BRCA2, BRIP1, CDH1, CHEK2, MLH1, MSH2, MSH6, PMS2, EPCAM, PTEN, PALB2, RAD51C, RAD51D, and TP53 genes for any harmful mutations that are associated with increased cancer risk.  If possible, it is recommended that the person(s) who has had cancer be tested before other family members.  That person will give us the most useful information about whether or not a specific gene is associated with the cancer in the family.    Results  There are three possible results of genetic testing:    Positive--a harmful mutation was identified in one or more of the genes    Negative--no mutation was identified in any of the genes on this panel    Variant of unknown significance--a variation in one of the genes was identified, but it is unclear how this impacts cancer risk in the family    Advantages and Disadvantages   There are advantages and  disadvantages to genetic testing.    Advantages    May clarify your cancer risk    Can help you make medical decisions    May explain the cancers in your family    May give useful information to your family members (if you share your results)    Disadvantages    Possible negative emotional impact of learning about inherited cancer risk    Uncertainty in interpreting a negative test result in some situations    Possible genetic discrimination concerns (see below)    Genetic Information Nondiscrimination Act (JOCELYN)  JOCELYN is a federal law that protects individuals from health insurance or employment discrimination based on a genetic test result alone.  Although rare, there are currently no legal discrimination protections in terms of life insurance, long term care, or disability insurances.  Visit the [employer] website to learn more.    Reducing Cancer Risk  All of the genes described above have nationally recognized cancer screening guidelines that would be recommended for individuals who test positive.  In addition to increased cancer screening, surgeries may be offered or recommended to reduce cancer risk.  Recommendations are based upon an individual s genetic test result as well as their personal and family history of cancer.    Questions to Think About Regarding Genetic Testing:    What effect will the test result have on me and my relationship with my family members if I have an inherited gene mutation?  If I don t have a gene mutation?    Should I share my test results, and how will my family react to this news, which may also affect them?    Are my children ready to learn new information that may one day affect their own health?    Hereditary Cancer Resources    FORCE: Facing Our Risk of Cancer Empowered facingourrisk.org   Bright Pink bebrightpink.org   Li-Fraumeni Syndrome Association lfsassociation.org   PTEN World PTENworld.com   No stomach for cancer, Inc.  nostomachforcancer.org   Stomach cancer relief network Scrnet.org   Collaborative Group of the Americas on Inherited Colorectal Cancer (CGA) cgaicc.com    Cancer Care cancercare.org   American Cancer Society (ACS) cancer.org   National Cancer North East (NCI) cancer.gov     Please call us if you have any questions or concerns.   Cancer Risk Management Program [phone] ([phone])  ? Kojo Ponce, MS, [facility] [phone]  ? Lorene Alanis, MS, [facility]  [phone]  ? Love Lenz, MS, [facility]  [phone]  ? Farzana Alec, MS, [facility] [phone]  ? Rosalind Rochelle, MS, [facility] [phone]  ? Joy Diego, MS, [facility]  [phone]  ? Aida Bettencourt, MS  [phone]    References  1. Nicci DINH, Vee PDP, Miranda S, Omid WHITTINGTON, Vianey JE, Ailyn JL, Suad N, Kodi H, Danielle O, Tye A, Vandana B, Radishannon P, Manallen S, Nathan DM, Escoto N, Susu E, Johnna H, Filiberto E, Sumi J, Gronbilly J, Marcela B, Pascale H, Thorlacius S, Eerola H, Nevshantena H, Sathish K, Sandra OP. Average risks of breast and ovarian cancer associated with BRCA1 or BRCA2 mutations detected in case series unselected for family history: a combined analysis of 222 studies. Am J Hum Sophia. 2003;72:1117-30.  2. Kassandra N, Deborah M, Ivan G.  BRCA1 and BRCA2 Hereditary Breast and Ovarian Cancer. Gene Reviews online. 2013.  3. John YC, Jennifer S, Yue G, Figueroa S. Breast cancer risk among male BRCA1 and BRCA2 mutation carriers. J Natl Cancer Inst. 2007;99:1811-4.  4. Roger ISRAEL, Halley I, Johny J, Farzana E, Alia ER, Manoj F. Risk of breast cancer in male BRCA2 carriers. J Med Sophia. 2010;47:710-1.  5. National Comprehensive Cancer Network. Clinical practice guidelines in oncology, colorectal cancer screening. Available online (registration required). 2015.  6. Jey ALEJANDRO, Ramila J, Wood J, Wendy LA, Luciano MARES, Eng C. Lifetime cancer risks in individuals with germline PTEN mutations. Clin Cancer Res. 2012;18:400-7.  7. Pilarski R. Cowden  Syndrome: A Critical Review of the Clinical Literature. J Sophia . 2009:18:13-27.  8. Reinier A, Guzman D, Doc S, Fiona P, Theresa T, Delfino M, Fab B, Stephanie H, Kylah R, Steve K, Polo L, Roger DG, Nathan D, Shilo DF, Cristina MR, The Breast Cancer Susceptibility Collaboration (UK) & Nilda REZA. RUKHSANA mutations that cause ataxia-telangiectasia are breast cancer susceptibility alleles. Nature Genetics. 2006;38:873-875  9. Ryan N , Angelita Y, Radha J, Stephanie L, Sixto GM , Amy ML, Gallinger S, Wen AG, Syngal S, Mary ML, Edel J , Nadeen R, Ludwig SZ, Bailee JR, Marcy VE, Justa M, Vohailee B, Allie N, Keith RH, Joseline KW, and Murali AP. RUKHSANA mutations in patients with hereditary pancreatic cancer. Cancer Discover. 2012;2:41-46  10. Nicci SARGENT, et al. Breast-Cancer Risk in Families with Mutations in PALB2. NEJM. 2014; 371(6):497-506.  11. CHEK2 Breast Cancer Case-Control Consortium. CHEK2*1100delC and susceptibility to breast cancer: A collaborative analysis involving 10,860 breast cancer cases and 9,065 controls from 10 studies. Am J Hum Sophia, 74 (2004), pp. 0557-5068  12. Ruth T, Matteo S, Ebenezer K, et al. Spectrum of Mutations in BRCA1, BRCA2, CHEK2, and TP53 in Families at High Risk of Breast Cancer. SUZANNE. 2006;295(12):9696-6435.   13. Maureen C, David D, Charlene A, et al. Risk of breast cancer in women with a CHEK2 mutation with and without a family history of breast cancer. J Clin Oncol. 2011;29:7892-5060.  14. Celso H, Michael E, Ray SJ, et al. Contribution of germline mutations in the RAD51B, RAD51C, and RAD51D genes to ovarian cancer in the population. J Clin Oncol. 2015;33(26):4164-9141. Doi:10.1200/JCO.2015.61.2408.  15. Brice T, Sher PUGH, Sylvia P, et al. Mutations in BRIP1 confer high risk of ovarian cancer. Mimi Sophia. 2011;43(11):6892-1688. doi:10.1038/ng.955.

## 2021-10-11 NOTE — PROGRESS NOTES
10/11/2021    Cinthya is a 65 year old who is being evaluated via a billable video visit.      How would you like to obtain your AVS? MyChart  If the video visit is dropped, the invitation should be resent by: Send to e-mail at: [email or web address]  Will anyone else be joining your video visit? No    Video-Visit Details  Type of service:  Video Visit  Video Start Time: 11:51am  Video End Time:12:44pm  Originating Location (pt. Location): Home  Distant Location (provider location):  [facility]   Platform used for Video Visit: Muxlim    Referring Provider: Eloise Santana PA-C    Present for Today's Visit: Cinthya    Presenting Information:   I met with Cinthya Waite today for genetic counseling (video visit due to covid19) to discuss her personal and family history of breast cancer.  She is here today to review this history, cancer screening recommendations, and available genetic testing options.    Personal History:  Cinthya is a 65 year old female. She was diagnosed with an invasive ductal carcinoma of the left breast (ER/Pr positive) in 2007 at age 51. She underwent a left lumpectomy and had chemotherapy and radiation as well as 5 years for hormone blocker therapy.    Of note, on 3/06/2013, she had BRACAnalysis and BRACAnalysis Rearrangement Testing (LAURITA) from [facility].  Test results were negative with no mutations found in BRCA1 and BRCA2 genes. Subsequently, in 2015, she tested negative for mutations in the PALB2 gene via sequencing and deletion/duplication analysis through [employer]. This previous genetic counseling and test were completed at [facility] and records are in Cinthya's chart.    Cinthya is s/p FRANCISCO-BSO in 2018 with benign pathology.       She had her first menstrual period at age 10, her first child at age 27, and is postmenopausal.  She reports past history of oral contraceptive use for about 5 years and that she has never been on hormone  replacement therapy.      She has annual clinical breast exams and mammograms. Her most recent mammogram on 4/7/2021 was a diagnostic mammogram due to breast pain and results were normal. Her breasts have scattered fibroglandular densities.  She reports that she's had 3 or 4 colonoscopies and no polyp history. Her most recent colonoscopy in 2013 was normal with follow-up recommended in 10 years. She does not regularly do any other cancer screening at this time.  Cinthya reported minimal past tobacco use (~4 years in the 1070's), and about 3 drinks per week for alcohol use.    Family History: (Please see scanned pedigree for detailed family history information)    Mother, age 91, with a history of breast cancer (DCIS) in her 60's. Cinthya reports that her mother had a hysterectomy in her 30's for an unknown reason, (doesn't believe it was cancer-related; unknown status of ovaries).     Maternal aunt passed in her 50's from lung cancer diagnosed in her early 50's. She was a smoker.     Her maternal ethnicity is Ashkenazi Oriental orthodox and [place]. Her paternal ethnicity is Ashkenazi Oriental orthodox, [place], and [place]i. There is no reported consanguinity.    Discussion:    Cinthya's personal and family history of breast cancer is suggestive of a hereditary cancer syndrome.    We reviewed the features of sporadic, familial, and hereditary cancers. In looking at Cinthya's family history, it is possible that a cancer susceptibility gene is present due to her breast cancer history, her mother's breast cancer history and her Ashkenazi Oriental orthodox ancestry.    We discussed the natural history and genetics of hereditary cancers. A detailed handout regarding the information we discussed will be sent to Cinthya via [email or web address]. Topics included: inheritance pattern, cancer risks, cancer screening recommendations, and also risks, benefits and limitations of testing.  We discussed her previous genetic testing in 2013 for mutations in the BRCA1/2 genes and  in 2015 for mutations in the PALB2 gene; both of which were negative. NCCN guidelines also mention that there may be a role for multi-gene panel genetic testing for patients who have previously tested negative for a single hereditary cancer syndrome, but have a suspicious personal or family history. We alsodiscussed her Ashkenazi Protestant (AJ) ancestry in relation to mutations in breast cancer risk genes. We discussed that mutations in genes other that BRCA1/2 and PALB2 have been identified in breast cancer patients with Ashkenazi Protestant ancestry such as CHEK2; SUZANNE Oncol. 2017;3(12):6758-1574. doi:10.1001/jamaoncol.2017.1996    Based on her personal and family history, Cinthya meets current National Comprehensive Cancer Network (NCCN) criteria for genetic testing of high-penetrance breast cancer susceptibility genes (including BRCA1, BRCA2, CDH1, PALB2, PTEN, and TP53).      We discussed that there are additional genes that could cause increased risk for breast cancer. As many of these genes present with overlapping features in a family and accurate cancer risk cannot always be established based upon the pedigree analysis alone, it would be reasonable for Cinthya to consider panel genetic testing to analyze multiple genes at once.    We discussed updated genetic testing options for Cinthya given her personal and family history of breast cancer including targeted panel of genes associated with increased risk for breast and gynecologic cancers (BreastNext-Expanded) and expanded panel options including genes associated with common (CancerNext) or common and rare hereditary cancers (CancerNext-Expanded). After our discussion, Cinthya shared that she would like to get as much information as possible from testing. She expressed interest in the CancerNext-Expanded panel through [facility].   Genetic testing is available for 77 genes associated with increased risk for many different cancers: CancerNext-Expanded (AIP, ALK,  APC, RUKHSANA, AXIN2, BAP1, BARD1, BLM, BMPR1A, BRCA1, BRCA2, BRIP1, CDC73, CDH1, CDK4, CDKN1B, CDKN2A, CHEK2, CTNNA1, DICER1, EGFR, EGLN1, EPCAM, FANCC, FH, FLCN, GALNT12, GREM1, HOXB13, KIF1B, KIT, LZTR1, MAX, MEN1, MET, MITF, MLH1, MSH2, MSH3, MSH6, MUTYH, NBN, NF1, NF2, NTHL1, PALB2, PDGFRA, PHOX2B, PMS2, POLD1, POLE, POT1, LIIKJ6K, PTCH1, PTEN, RAD51C, RAD51D, RB1, RECQL, RET, SDHA, SDHAF2, SDHB, SDHC, SDHD, SMAD4, SMARCA4, SMARCB1, SMARCE1, STK11, SUFU, BRWU415, TP53, TSC1, TSC2, VHL, and XRCC2).  We discussed that many of the genes in the CancerNext-Expanded panel are associated with specific hereditary cancer syndromes and have published management guidelines:  Hereditary Breast and Ovarian Cancer syndrome (BRCA1, BRCA2), Wise syndrome (MLH1, MSH2, MSH6, PMS2, EPCAM), Familial Adenomatous Polyposis (APC), Hereditary Diffuse Gastric Cancer (CDH1), Familial Atypical Multiple Mole Melanoma syndrome (CDK4, CDKN2A), Juvenile Polyposis syndrome (BMPR1A, SMAD4), Cowden syndrome (PTEN), Li Fraumeni syndrome (TP53), Peutz-Jeghers syndrome (STK11), MUTYH Associated Polyposis (MUTYH), Hereditary Leiomyomatosis and Renal Cell Cancer (FH), Tofq-Jjug-Tpjs (FLCN), Hereditary Papillary Renal Carcinoma (MET), Hereditary Paraganglioma and Pheochromocytoma syndrome (SDHA, SDHAF2, SDHB, SDHC, SDHD), Multiple Endocrine Neoplasia type 2 (RET), Tuberous sclerosis complex (TSC1, TSC2), Von Hippel-Lindau disease (VHL), Neurofibromatosis type 1 (NF1), Neurofibromatosis type 2 (NF2), Multiple Endocrine Neoplasia type 1 (MEN1), Multiple Endocrine Neoplasia type 4 (CDKN1B), Gorlin syndrome (SUFU, PTCH1), and Islas complex (NUAKT5I).  The RUKHSANA, AXIN2, BARD1, BRIP1, CHEK2, GREM1, MSH3, NBN, NTHL1, PALB2, POLD1, POLE, RAD51C, and RAD51D genes are associated with increased cancer risk and have published management guidelines for certain cancers.    The remaining genes (AIP, ALK, BAP1, BLM, CDC73, CTNNA1, DICER1, EGFR, EGLN1, FANCC, GALNT12,  HOXB13, KIF1B, KIT, LZTR1, MAX, MITF, PDGFRA, PHOX2B, POT1, RECQL, RB1, SMARCA4, SMARCB1, SMARCE1, HVBI002, and XRCC2) are associated with increased cancer risk and may allow us to make medical recommendations when mutations are identified.      Medical Management: For Cinthya, we reviewed that the information from genetic testing may determine:    additional cancer screening for which Cinthya may qualify (i.e. mammogram and breast MRI, more frequent colonoscopies, more frequent dermatologic exams, etc.),    options for risk reducing surgeries Cinthya could consider (i.e. bilateral mastectomy, etc.),      and targeted chemotherapies for Cinthya if she were to develop certain cancers in the future (i.e. immunotherapy for individuals with Wise syndrome, PARP inhibitors, etc.).     These recommendations and possible targeted chemotherapies will be discussed in detail once genetic testing is completed.     Cinthya shared that would like to ensure insurance coverage and get a better idea of what the out-of-pocket through insurance might look like. A prior authorization will be submitted to Cinthya's insurance to [email or web address]. This information should be available in approximately 3-4 weeks. She will be notified as soon as a determination of coverage is received to discuss next steps.     Plan:  1) Today  Cinthya elected to proceed with submitting a prior authorization for CancerNext-Expanded genetic testing (77genes) through [email or web address].  2) This information should be available in approximately 3-4 weeks.  3) I will be notified by the laboratory regarding the prior authorization determination. I will contact  Cinthya with this information and to discuss next steps. If Cinthya hasn't heard from us regarding her prior authorization request in 4 weeks, she should reach out via walkby or phone call to check in on status.     Rosalind Byrd MS, [facility]  Licensed, Certified Genetic Counselor  [facility]  [facility]  [phone]  [email or web address]

## 2021-10-11 NOTE — LETTER
10/11/2021         RE: Cinthya Waite  [address]  [facility] [address]        Dear Colleague,    Thank you for referring your patient, Cinthya Waite, to the [facility]. Please see a copy of my visit note below.    10/11/2021    Cinthya is a 65 year old who is being evaluated via a billable video visit.      How would you like to obtain your AVS? MyChart  If the video visit is dropped, the invitation should be resent by: Send to e-mail at: [email or web address]  Will anyone else be joining your video visit? No    Video-Visit Details  Type of service:  Video Visit  Video Start Time: 11:51am  Video End Time:12:44pm  Originating Location (pt. Location): Home  Distant Location (provider location):  [facility]   Platform used for Video Visit: iLinc    Referring Provider: Eloise Santana PA-C    Present for Today's Visit: Cinthya    Presenting Information:   I met with Cinthya Waite today for genetic counseling (video visit due to covid19) to discuss her personal and family history of breast cancer.  She is here today to review this history, cancer screening recommendations, and available genetic testing options.    Personal History:  Cinthya is a 65 year old female. She was diagnosed with an invasive ductal carcinoma of the left breast (ER/Pr positive) in 2007 at age 51. She underwent a left lumpectomy and had chemotherapy and radiation as well as 5 years for hormone blocker therapy.    Of note, on 3/06/2013, she had BRACAnalysis and BRACAnalysis Rearrangement Testing (LAURITA) from [employer] [facility] [employer].  Test results were negative with no mutations found in BRCA1 and BRCA2 genes. Subsequently, in 2015, she tested negative for mutations in the PALB2 gene via sequencing and deletion/duplication analysis through [facility]. This previous genetic counseling and test were completed at [facility] and records are in  Cinthya's chart.    Cinthya is s/p FRANCISCO-BSO in 2018 with benign pathology.       She had her first menstrual period at age 10, her first child at age 27, and is postmenopausal.  She reports past history of oral contraceptive use for about 5 years and that she has never been on hormone replacement therapy.      She has annual clinical breast exams and mammograms. Her most recent mammogram on 4/7/2021 was a diagnostic mammogram due to breast pain and results were normal. Her breasts have scattered fibroglandular densities.  She reports that she's had 3 or 4 colonoscopies and no polyp history. Her most recent colonoscopy in 2013 was normal with follow-up recommended in 10 years. She does not regularly do any other cancer screening at this time.  Cinthya reported minimal past tobacco use (~4 years in the 1070's), and about 3 drinks per week for alcohol use.    Family History: (Please see scanned pedigree for detailed family history information)    Mother, age 91, with a history of breast cancer (DCIS) in her 60's. Cinthya reports that her mother had a hysterectomy in her 30's for an unknown reason, (doesn't believe it was cancer-related; unknown status of ovaries).     Maternal aunt passed in her 50's from lung cancer diagnosed in her early 50's. She was a smoker.     Her maternal ethnicity is Ashkenazi Anabaptism and Pakistani. Her paternal ethnicity is Ashkenazi Anabaptism, Pakistani, and Vincentian. There is no reported consanguinity.    Discussion:    Cinthya's personal and family history of breast cancer is suggestive of a hereditary cancer syndrome.    We reviewed the features of sporadic, familial, and hereditary cancers. In looking at Cinthya's family history, it is possible that a cancer susceptibility gene is present due to her breast cancer history, her mother's breast cancer history and her Ashkenazi Anabaptism ancestry.    We discussed the natural history and genetics of hereditary cancers. A detailed handout regarding the  information we discussed will be sent to Cinthya via [employer]. Topics included: inheritance pattern, cancer risks, cancer screening recommendations, and also risks, benefits and limitations of testing.  We discussed her previous genetic testing in 2013 for mutations in the BRCA1/2 genes and in 2015 for mutations in the PALB2 gene; both of which were negative. NCCN guidelines also mention that there may be a role for multi-gene panel genetic testing for patients who have previously tested negative for a single hereditary cancer syndrome, but have a suspicious personal or family history. We alsodiscussed her Ashkenazi Quaker (AJ) ancestry in relation to mutations in breast cancer risk genes. We discussed that mutations in genes other that BRCA1/2 and PALB2 have been identified in breast cancer patients with Ashkenazi Quaker ancestry such as CHEK2; SUZANNE Oncol. 2017;3(12):2018-2213. doi:10.1001/jamaoncol.2017.1996    Based on her personal and family history, Cinthya meets current National Comprehensive Cancer Network (NCCN) criteria for genetic testing of high-penetrance breast cancer susceptibility genes (including BRCA1, BRCA2, CDH1, PALB2, PTEN, and TP53).      We discussed that there are additional genes that could cause increased risk for breast cancer. As many of these genes present with overlapping features in a family and accurate cancer risk cannot always be established based upon the pedigree analysis alone, it would be reasonable for Cinthya to consider panel genetic testing to analyze multiple genes at once.    We discussed updated genetic testing options for Cinthya given her personal and family history of breast cancer including targeted panel of genes associated with increased risk for breast and gynecologic cancers (BreastNext-Expanded) and expanded panel options including genes associated with common (CancerNext) or common and rare hereditary cancers (CancerNext-Expanded). After our discussion, Cinthya shared  that she would like to get as much information as possible from testing. She expressed interest in the CancerNext-Expanded panel through [employer].   Genetic testing is available for 77 genes associated with increased risk for many different cancers: CancerNext-Expanded (AIP, ALK, APC, RUKHSANA, AXIN2, BAP1, BARD1, BLM, BMPR1A, BRCA1, BRCA2, BRIP1, CDC73, CDH1, CDK4, CDKN1B, CDKN2A, CHEK2, CTNNA1, DICER1, EGFR, EGLN1, EPCAM, FANCC, FH, FLCN, GALNT12, GREM1, HOXB13, KIF1B, KIT, LZTR1, MAX, MEN1, MET, MITF, MLH1, MSH2, MSH3, MSH6, MUTYH, NBN, NF1, NF2, NTHL1, PALB2, PDGFRA, PHOX2B, PMS2, POLD1, POLE, POT1, DEGPE6O, PTCH1, PTEN, RAD51C, RAD51D, RB1, RECQL, RET, SDHA, SDHAF2, SDHB, SDHC, SDHD, SMAD4, SMARCA4, SMARCB1, SMARCE1, STK11, SUFU, TVRC726, TP53, TSC1, TSC2, VHL, and XRCC2).  We discussed that many of the genes in the CancerNext-Expanded panel are associated with specific hereditary cancer syndromes and have published management guidelines:  Hereditary Breast and Ovarian Cancer syndrome (BRCA1, BRCA2), Wise syndrome (MLH1, MSH2, MSH6, PMS2, EPCAM), Familial Adenomatous Polyposis (APC), Hereditary Diffuse Gastric Cancer (CDH1), Familial Atypical Multiple Mole Melanoma syndrome (CDK4, CDKN2A), Juvenile Polyposis syndrome (BMPR1A, SMAD4), Cowden syndrome (PTEN), Li Fraumeni syndrome (TP53), Peutz-Jeghers syndrome (STK11), MUTYH Associated Polyposis (MUTYH), Hereditary Leiomyomatosis and Renal Cell Cancer (FH), Svbv-Jnco-Dfib (FLCN), Hereditary Papillary Renal Carcinoma (MET), Hereditary Paraganglioma and Pheochromocytoma syndrome (SDHA, SDHAF2, SDHB, SDHC, SDHD), Multiple Endocrine Neoplasia type 2 (RET), Tuberous sclerosis complex (TSC1, TSC2), Von Hippel-Lindau disease (VHL), Neurofibromatosis type 1 (NF1), Neurofibromatosis type 2 (NF2), Multiple Endocrine Neoplasia type 1 (MEN1), Multiple Endocrine Neoplasia type 4 (CDKN1B), Gorlin syndrome (SUFU, PTCH1), and Islas complex (OKQYC1B).  The RUKHSANA, AXIN2, BARD1,  BRIP1, CHEK2, GREM1, MSH3, NBN, NTHL1, PALB2, POLD1, POLE, RAD51C, and RAD51D genes are associated with increased cancer risk and have published management guidelines for certain cancers.    The remaining genes (AIP, ALK, BAP1, BLM, CDC73, CTNNA1, DICER1, EGFR, EGLN1, FANCC, GALNT12, HOXB13, KIF1B, KIT, LZTR1, MAX, MITF, PDGFRA, PHOX2B, POT1, RECQL, RB1, SMARCA4, SMARCB1, SMARCE1, ASLP739, and XRCC2) are associated with increased cancer risk and may allow us to make medical recommendations when mutations are identified.      Medical Management: For Cinthya, we reviewed that the information from genetic testing may determine:    additional cancer screening for which Cinthya may qualify (i.e. mammogram and breast MRI, more frequent colonoscopies, more frequent dermatologic exams, etc.),    options for risk reducing surgeries Cinthya could consider (i.e. bilateral mastectomy, etc.),      and targeted chemotherapies for Cinthya if she were to develop certain cancers in the future (i.e. immunotherapy for individuals with Wise syndrome, PARP inhibitors, etc.).     These recommendations and possible targeted chemotherapies will be discussed in detail once genetic testing is completed.     Cinthya shared that would like to ensure insurance coverage and get a better idea of what the out-of-pocket through insurance might look like. A prior authorization will be submitted to Cinthya's insurance to [employer]. This information should be available in approximately 3-4 weeks. She will be notified as soon as a determination of coverage is received to discuss next steps.     Plan:  1) Today  Cinthya elected to proceed with submitting a prior authorization for CancerNext-Expanded genetic testing (77genes) through [employer].  2) This information should be available in approximately 3-4 weeks.  3) I will be notified by the laboratory regarding the prior authorization determination. I will contact  Cinthya with this information and  to discuss next steps. If Cinthya hasn't heard from us regarding her prior authorization request in 4 weeks, she should reach out via MyChart or phone call to check in on status.     Rosalind Byrd MS, [facility]  Licensed, Certified Genetic Counselor  [facility]  [phone]  [email or web address]            Again, thank you for allowing me to participate in the care of your patient.        Sincerely,        Rosalind Dennis, GC

## 2021-11-03 ENCOUNTER — MYC MEDICAL ADVICE (OUTPATIENT)
Dept: INTERNAL MEDICINE | Facility: CLINIC | Age: 66
End: 2021-11-03

## 2021-11-03 ENCOUNTER — MYC MEDICAL ADVICE (OUTPATIENT)
Dept: ONCOLOGY | Facility: CLINIC | Age: 66
End: 2021-11-03

## 2021-11-11 ENCOUNTER — OFFICE VISIT (OUTPATIENT)
Dept: INTERNAL MEDICINE | Facility: CLINIC | Age: 66
End: 2021-11-11
Payer: COMMERCIAL

## 2021-11-11 VITALS
WEIGHT: 168.9 LBS | HEART RATE: 80 BPM | DIASTOLIC BLOOD PRESSURE: 76 MMHG | OXYGEN SATURATION: 98 % | SYSTOLIC BLOOD PRESSURE: 120 MMHG | BODY MASS INDEX: 28.83 KG/M2 | RESPIRATION RATE: 18 BRPM | HEIGHT: 64 IN

## 2021-11-11 DIAGNOSIS — Z01.818 PRE-OP EXAM: Primary | ICD-10-CM

## 2021-11-11 DIAGNOSIS — H26.9 CATARACT OF BOTH EYES, UNSPECIFIED CATARACT TYPE: ICD-10-CM

## 2021-11-11 DIAGNOSIS — E03.9 ACQUIRED HYPOTHYROIDISM: ICD-10-CM

## 2021-11-11 DIAGNOSIS — R05.9 COUGH: ICD-10-CM

## 2021-11-11 DIAGNOSIS — I10 ESSENTIAL HYPERTENSION: ICD-10-CM

## 2021-11-11 LAB
ANION GAP SERPL CALCULATED.3IONS-SCNC: 10 MMOL/L (ref 5–18)
BUN SERPL-MCNC: 16 MG/DL (ref 8–22)
CALCIUM SERPL-MCNC: 9 MG/DL (ref 8.5–10.5)
CHLORIDE BLD-SCNC: 107 MMOL/L (ref 98–107)
CO2 SERPL-SCNC: 25 MMOL/L (ref 22–31)
CREAT SERPL-MCNC: 0.85 MG/DL (ref 0.6–1.1)
GFR SERPL CREATININE-BSD FRML MDRD: 71 ML/MIN/1.73M2
GLUCOSE BLD-MCNC: 96 MG/DL (ref 70–125)
POTASSIUM BLD-SCNC: 4.1 MMOL/L (ref 3.5–5)
SODIUM SERPL-SCNC: 142 MMOL/L (ref 136–145)

## 2021-11-11 PROCEDURE — U0003 INFECTIOUS AGENT DETECTION BY NUCLEIC ACID (DNA OR RNA); SEVERE ACUTE RESPIRATORY SYNDROME CORONAVIRUS 2 (SARS-COV-2) (CORONAVIRUS DISEASE [COVID-19]), AMPLIFIED PROBE TECHNIQUE, MAKING USE OF HIGH THROUGHPUT TECHNOLOGIES AS DESCRIBED BY CMS-2020-01-R: HCPCS | Mod: 90 | Performed by: INTERNAL MEDICINE

## 2021-11-11 PROCEDURE — 99000 SPECIMEN HANDLING OFFICE-LAB: CPT | Performed by: INTERNAL MEDICINE

## 2021-11-11 PROCEDURE — 99214 OFFICE O/P EST MOD 30 MIN: CPT | Performed by: INTERNAL MEDICINE

## 2021-11-11 PROCEDURE — 80048 BASIC METABOLIC PNL TOTAL CA: CPT | Performed by: INTERNAL MEDICINE

## 2021-11-11 PROCEDURE — 36415 COLL VENOUS BLD VENIPUNCTURE: CPT | Performed by: INTERNAL MEDICINE

## 2021-11-11 PROCEDURE — U0005 INFEC AGEN DETEC AMPLI PROBE: HCPCS | Mod: 90 | Performed by: INTERNAL MEDICINE

## 2021-11-11 RX ORDER — FAMOTIDINE 40 MG/1
40 TABLET, FILM COATED ORAL
Qty: 30 TABLET | Refills: 3 | Status: SHIPPED | OUTPATIENT
Start: 2021-11-11 | End: 2022-11-03

## 2021-11-11 ASSESSMENT — MIFFLIN-ST. JEOR: SCORE: 1273.19

## 2021-11-11 NOTE — PROGRESS NOTES
[facility]  [address] [place] [address]  Phone: [phone]  Fax: [phone]  Primary Provider: Heidi Valencia  :[id number]}  PREOPERATIVE EVALUATION:  Today's date: 11/11/2021    Cinthya Waite is a 66 year old female who presents for a preoperative evaluation.    Surgical Information:  Surgery/Procedure: Cataracts  Surgery Location: [facility]  Surgeon: Dr Iyer  Surgery Date: 11/22/21 (R) eye , 12/6/2 (L) eye  Where patient plans to recover: At home with family  Fax number for surgical facility: [phone]    Type of Anesthesia Anticipated: Topical MAC    Assessment/Plan:     DX: 1. Pre-op exam  Patient is medically stable for this low risk surgery.  He does have mild dry cough as below, will do further work-up and treatment as below with goal of resolution of cough prior to surgery.  We will check preoperative electrolytes since she is on lisinopril.    2. Cataract of both eyes, unspecified cataract type      3. Essential hypertension  Well-controlled on lisinopril and amlodipine.  Will check potassium level.  Discussed to take amlodipine date of surgery but not lisinopril.  - Basic metabolic panel  (Ca, Cl, CO2, Creat, Gluc, K, Na, BUN)    4. Cough  Mild dry cough in the last 10 days, not better or worse.  It usually happens at night only.  Lung exam today is normal.  Will screen for Covid.  Discussed a trial of famotidine at bedtime.  If that does not help then she will stop lisinopril to see if it helps resolve the cough.  She will monitor her blood pressure at home closely and will let us know if it is 140 or above.    - famotidine (PEPCID) 40 MG tablet; Take 1 tablet (40 mg) by mouth nightly as needed for heartburn  Dispense: 30 tablet; Refill: 3  - Symptomatic COVID-19 Virus (Coronavirus) by PCR    5. Acquired hypothyroidism  Recent thyroid levels were normal.      Subjective     HPI related to upcoming  procedure:   Cinthya is a 66-year-old female with history of hypertension, hypothyroidism, Raynaud's disease, history of breast cancer depression and rainouts.  She is currently here for preop evaluation for bilateral cataract surgery in December.    Previous surgical history significant for appendectomy, , lumpectomy and most recently total abdominal hysterectomy.  She reports nausea with general anesthesia but no side effects with MAC anesthesia, no bleeding or clotting problems, no family history of anesthesia problems.    Blood pressure is well controlled on lisinopril and amlodipine which she takes in the morning.  She is very physically active in 3 times a week some activity including yoga, cardio, weight lifting.  She denies any chest pains palpitations or change in exercise tolerance.    She is on Synthroid supplementation for hypothyroidism and recent TSH level was normal.    No history of asthma or smoking.  She has had 3 vaccinations for Covid.  About a week ago she started experiencing mild dry cough.  It happens at night only and it nonproductive.  She denies any fevers chills or sore throat, no Covid exposures.  She is currently on lisinopril and reports that in the past has had mild cough on it which improved after dose was decreased.    Review of systems: Recently had UTI and was treated for it with Bactrim.  Currently denies any recurrent urinary symptoms, no GI symptoms.    Preop Questions 2021   1. Have you ever had a heart attack or stroke? No   2. Have you ever had surgery on your heart or blood vessels, such as a stent placement, a coronary artery bypass, or surgery on an artery in your head, neck, heart, or legs? UNKNOWN -    3. Do you have chest pain with activity? No   4. Do you have a history of  heart failure? No   5. Do you currently have a cold, bronchitis or symptoms of other infection? No   6. Do you have a cough, shortness of breath, or wheezing? No   7. Do you or anyone  in your family have previous history of blood clots? No   8. Do you or does anyone in your family have a serious bleeding problem such as prolonged bleeding following surgeries or cuts? No   9. Have you ever had problems with anemia or been told to take iron pills? No   10. Have you had any abnormal blood loss such as black, tarry or bloody stools, or abnormal vaginal bleeding? No   11. Have you ever had a blood transfusion? No   12. Are you willing to have a blood transfusion if it is medically needed before, during, or after your surgery? Yes   13. Have you or any of your relatives ever had problems with anesthesia? No   14. Do you have sleep apnea, excessive snoring or daytime drowsiness? No   15. Do you have any artifical heart valves or other implanted medical devices like a pacemaker, defibrillator, or continuous glucose monitor? No   16. Do you have artificial joints? No   17. Are you allergic to latex? No         Patient Active Problem List    Diagnosis Date Noted     Lichen sclerosus 03/26/2019     Priority: Medium     Status post hysterectomy 08/04/2018     Priority: Medium     S/P laparoscopic hysterectomy 08/03/2018     Priority: Medium     Malignant neoplasm of upper-inner quadrant of left breast in female, estrogen receptor positive (H) 10/07/2017     Priority: Medium     Pulmonary nodule 08/01/2017     Priority: Medium     ACP (advance care planning) 11/10/2015     Priority: Medium     Advance Care Planning 11/10/2015: Receipt of ACP document:  Received: Health Care Directive which was witnessed or notarized on 8-6-07.  Document previously scanned on 10-30-15.  Validation form completed and sent to be scanned.  Code Status needs to be updated to reflect choices in most recent ACP document.  Confirmed/documented designated decision maker(s).  Added by Nelida Mcpherson RN Advance Care Planning Liaison with [employer]           HYPERLIPIDEMIA LDL GOAL <130 10/31/2010     Priority: Medium      Hypothyroidism 2005     Priority: Medium     synthroid  Problem list name updated by automated process. Provider to review       Essential hypertension 2005     Priority: Medium     Problem list name updated by automated process. Provider to review       Pure hypercholesterolemia 2005     Priority: Medium     Lipitor       Depressive disorder, not elsewhere classified 2005     Priority: Medium     Buspar, wellbutrin zoloft        Past Medical History:   Diagnosis Date     Abnormal Pap smear      Breast cancer (H) 2007    T1N0 left breast cancer     Depressive disorder, not elsewhere classified     Buspar, wellbutrin zoloft  sees Dr. Lim      Lichen sclerosus et atrophicus      Pure hypercholesterolemia     Lipitor     Raynaud's disease      Unspecified essential hypertension      Unspecified hypothyroidism     synthroid     Past Surgical History:   Procedure Laterality Date     ABDOMEN SURGERY      c- section x 2     APPENDECTOMY       APPENDECTOMY       BIOPSY       BREAST LUMPECTOMY, RT/LT  2007    Breast Lumpectomy /LT     BREAST SURGERY      lumpectomy     C APPENDECTOMY      Description: Appendectomy;  Recorded: 10/25/2011;     C  DELIVERY ONLY      Description:  Section;  Recorded: 10/25/2011;  Comments: x2.      SECTION       COLONOSCOPY       CYSTOSCOPY N/A 8/3/2018    Procedure: CYSTOSCOPY;;  Surgeon: Cyndee Caballero MD;  Location: [facility] OR     DAVINCI HYSTERECTOMY TOTAL, BILATERAL SALPINGO-OOPHORECTOMY, COMBINED Bilateral 8/3/2018    Procedure: COMBINED DAVINCI HYSTERECTOMY TOTAL, SALPINGO-OOPHORECTOMY;  Davinci Total Laparoscopic Hysterectomy, Bilateral Salpingo Oophorectomy, and Cystoscopy ;  Surgeon: Cyndee Caballero MD;  Location: [facility] OR     DILATION AND CURETTAGE, OPERATIVE HYSTEROSCOPY WITH MORCELLATOR, COMBINED N/A 10/28/2015    Procedure: COMBINED DILATION AND CURETTAGE, OPERATIVE HYSTEROSCOPY WITH MORCELLATOR;  Surgeon: Cyndee Wyman  MD Sel[place];  Location: [facility] OR     HYSTERECTOMY       [facility] NONSPECIFIC PROCEDURE  10/82,     x2     [facility] NONSPECIFIC PROCEDURE      Appendicitis     [facility] NONSPECIFIC PROCEDURE      Hysteroscopy, D & C     Current Outpatient Medications   Medication Sig Dispense Refill     amLODIPine (NORVASC) 5 MG tablet Take 5 mg by mouth daily  11     aspirin 81 MG tablet Take 81 mg by mouth daily       atorvastatin (LIPITOR) 40 MG tablet Take 40 mg by mouth daily.        buPROPion (BUPROBAN) 150 MG 12 hr tablet Take 150 mg by mouth once        buPROPion (WELLBUTRIN XL) 150 MG 24 hr tablet TAKE 3 TABLETS BY MOUTH EVERY DAY       busPIRone (BUSPAR) 15 MG tablet TAKE 1 TABLET BY MOUTH TWICE A DAY       Calcium Citrate 200 MG TABS Take 1 tablet by mouth daily       Cholecalciferol 100 MCG (4000 UT) CAPS Take 4,000 Units by mouth       clobetasol (TEMOVATE) 0.05 % external ointment Apply in thin layer to vulva as instructed, twice weekly.  Do not apply to face. 45 g 3     Denosumab (PROLIA SC)        hydrocortisone 2.5 % cream PLEASE SEE ATTACHED FOR DETAILED DIRECTIONS       ketoconazole (NIZORAL) 2 % external cream PLEASE SEE ATTACHED FOR DETAILED DIRECTIONS       levothyroxine (SYNTHROID, LEVOTHROID) 125 MCG tablet 125 mcg daily Alternate 150 with 125 mcg Every other day       levothyroxine (SYNTHROID/LEVOTHROID) 150 MCG tablet TAKE EVERY OTHER DAY AND ALTERNATE WITH 125 DOSE       lisinopril (ZESTRIL) 2.5 MG tablet Take 2.5 mg by mouth daily       Magnesium Carbonate 250 MG/GM POWD Take 250 mg by mouth       magnesium oxide 200 MG TABS Take by mouth daily       melatonin 0.25 mg TABS quarter-tab        Vitamin D, Cholecalciferol, 1000 UNITS TABS Take 2,000 Units by mouth daily       sulfamethoxazole-trimethoprim (BACTRIM DS) 800-160 MG tablet Take 1 tablet by mouth 2 times daily 10 tablet 3       Allergies   Allergen Reactions     Diclofenac Unknown     Elevation of LFTs.  Elevation of LFTs.        Social History  "    Tobacco Use     Smoking status: Former Smoker     Types: Cigarettes     Start date: 1971     Quit date: 1975     Years since quittin.5     Smokeless tobacco: Never Used     Tobacco comment: Smoked in  for 1 year   Substance Use Topics     Alcohol use: Not on file     Comment: occ     History   Drug Use Not on file         Objective     /76 (BP Location: Left arm, Patient Position: Sitting, Cuff Size: Adult Regular)   Pulse 80   Resp 18   Ht 1.613 m (5' 3.5\")   Wt 76.6 kg (168 lb 14.4 oz)   LMP 2007   SpO2 98%   BMI 29.45 kg/m      Physical Exam  General: well appearing female, alert and oriented x3  EYES: Eyelids, conjunctiva, and sclera were normal. Pupils were normal.   HEAD, EARS, NOSE, MOUTH, AND THROAT: no cervical LAD, no thyromegaly or nodules appreciated. TMs are visualized and normal, oropharynx is clear.  RESPIRATORY: respirations non labored, CTA bl, no wheezes, rales, no forced expiratory wheezing.  CARDIOVASCULAR: Heart rate and rhythm were normal. No murmurs, rubs,gallops. There was no peripheral edema.   GASTROINTESTINAL: Positive bowel sounds, abdomen is soft, non tender, non distended.     MUSCULOSKELETAL: Muscle mass was normal for age. No joint synovitis or deformity.  LYMPHATIC: There were no enlarged nodes palpable.  SKIN/HAIR/NAILS: Skin color was normal.  No rashes.  NEUROLOGIC: The patient was alert and oriented.  Speech was normal.  There is no facial asymmetry.   PSYCHIATRIC:  Mood and affect were normal.         Recent Labs   Lab Test 10/01/21  0811 21  0819 20  0821   NA  --  144 140   POTASSIUM  --  4.6 4.0   CR  --  0.81 0.86   TSH 1.51 0.66 4.13     No results found for: VITDT  No components found for: VITD  Lab Results   Component Value Date    CHOL 189 10/01/2021    CHOL 276 2012        Diagnostics:  Labs pending at this time.  Results will be reviewed when available.   No EKG required for low risk surgery (cataract, skin " procedure, breast biopsy, etc).    Revised Cardiac Risk Index (RCRI):  The patient has the following serious cardiovascular risks for perioperative complications:   - No serious cardiac risks = 0 points     RCRI Interpretation: 0 points: Class I (very low risk - 0.4% complication rate)         Signed Electronically by: Dayanna Harvey MD

## 2021-11-11 NOTE — PATIENT INSTRUCTIONS
1. Will check kidney function and potassium level today    2. Due to cough: will screen for covid. Can also do trial off Lisinopril. Monitor b/p at home. If consistently 140 or highr, can add a small dose of different b/p medication.     Acid reflux can also cause cough, try Pepcid at bed time.     3. Day of surgery O'k to take amlodipine, skip lisinopril.

## 2021-11-13 LAB — SARS-COV-2 RNA RESP QL NAA+PROBE: NOT DETECTED

## 2021-12-02 ENCOUNTER — LAB (OUTPATIENT)
Dept: LAB | Facility: CLINIC | Age: 66
End: 2021-12-02
Attending: INTERNAL MEDICINE
Payer: COMMERCIAL

## 2021-12-02 ENCOUNTER — E-VISIT (OUTPATIENT)
Dept: INTERNAL MEDICINE | Facility: CLINIC | Age: 66
End: 2021-12-02

## 2021-12-02 DIAGNOSIS — Z20.822 ENCOUNTER FOR LABORATORY TESTING FOR COVID-19 VIRUS: ICD-10-CM

## 2021-12-02 DIAGNOSIS — Z20.822 ENCOUNTER FOR LABORATORY TESTING FOR COVID-19 VIRUS: Primary | ICD-10-CM

## 2021-12-02 PROCEDURE — U0005 INFEC AGEN DETEC AMPLI PROBE: HCPCS

## 2021-12-02 PROCEDURE — 99421 OL DIG E/M SVC 5-10 MIN: CPT | Performed by: INTERNAL MEDICINE

## 2021-12-02 PROCEDURE — U0003 INFECTIOUS AGENT DETECTION BY NUCLEIC ACID (DNA OR RNA); SEVERE ACUTE RESPIRATORY SYNDROME CORONAVIRUS 2 (SARS-COV-2) (CORONAVIRUS DISEASE [COVID-19]), AMPLIFIED PROBE TECHNIQUE, MAKING USE OF HIGH THROUGHPUT TECHNOLOGIES AS DESCRIBED BY CMS-2020-01-R: HCPCS

## 2021-12-02 NOTE — TELEPHONE ENCOUNTER
Provider E-Visit time total (minutes): 5 minutes    Patient exposed to Covid.  She has had 1 - Covid test and requires another before her surgery can be scheduled.  Orders placed

## 2021-12-03 LAB — SARS-COV-2 RNA RESP QL NAA+PROBE: NEGATIVE

## 2021-12-06 DIAGNOSIS — R05.9 COUGH: ICD-10-CM

## 2021-12-07 RX ORDER — FAMOTIDINE 40 MG/1
40 TABLET, FILM COATED ORAL
Qty: 30 TABLET | Refills: 3 | OUTPATIENT
Start: 2021-12-07

## 2021-12-07 NOTE — TELEPHONE ENCOUNTER
Last Written Prescription Date:  11/11/21  Last Fill Quantity: 30,  # refills: 3       Kathia Delgado RN 12/07/21 5:20 PM

## 2021-12-13 DIAGNOSIS — F34.1 DYSTHYMIC DISORDER: ICD-10-CM

## 2021-12-13 DIAGNOSIS — I10 ESSENTIAL (PRIMARY) HYPERTENSION: ICD-10-CM

## 2021-12-13 DIAGNOSIS — E03.9 HYPOTHYROIDISM, UNSPECIFIED: ICD-10-CM

## 2021-12-15 ENCOUNTER — TRANSFERRED RECORDS (OUTPATIENT)
Dept: HEALTH INFORMATION MANAGEMENT | Facility: CLINIC | Age: 66
End: 2021-12-15
Payer: COMMERCIAL

## 2021-12-15 LAB
CREATININE (EXTERNAL): 0.95 MG/DL (ref 0.57–1.11)
GFR ESTIMATED (EXTERNAL): 58 ML/MIN/1.73M2
GFR ESTIMATED (IF AFRICAN AMERICAN) (EXTERNAL): >60 ML/MIN/1.73M2
GLUCOSE (EXTERNAL): 115 MG/DL (ref 65–100)
POTASSIUM (EXTERNAL): 3.7 MMOL/L (ref 3.5–5)

## 2021-12-15 RX ORDER — BUPROPION HYDROCHLORIDE 150 MG/1
TABLET ORAL
Qty: 270 TABLET | Refills: 2 | Status: SHIPPED | OUTPATIENT
Start: 2021-12-15 | End: 2022-11-06

## 2021-12-15 RX ORDER — LEVOTHYROXINE SODIUM 150 UG/1
TABLET ORAL
Qty: 45 TABLET | Refills: 1 | Status: SHIPPED | OUTPATIENT
Start: 2021-12-15 | End: 2022-07-01

## 2021-12-15 RX ORDER — ATORVASTATIN CALCIUM 40 MG/1
TABLET, FILM COATED ORAL
Qty: 90 TABLET | Refills: 2 | Status: SHIPPED | OUTPATIENT
Start: 2021-12-15 | End: 2022-02-16

## 2021-12-15 RX ORDER — LISINOPRIL 5 MG/1
TABLET ORAL
Qty: 90 TABLET | Refills: 1 | Status: SHIPPED | OUTPATIENT
Start: 2021-12-15 | End: 2022-01-05

## 2021-12-15 RX ORDER — LEVOTHYROXINE SODIUM 125 UG/1
TABLET ORAL
Qty: 45 TABLET | Refills: 1 | Status: SHIPPED | OUTPATIENT
Start: 2021-12-15 | End: 2022-07-01

## 2021-12-15 NOTE — TELEPHONE ENCOUNTER
"Routing refill request to provider for review/approval because:  A break in medication-Lisinopril.   Labs not current: PHQ9-Wellbutrin.   Clarification on Levothyroxine, is patient to be on both 150 mcg and also 125 mcg?   Drug to drug interaction-Atorvastatin     Last Written Prescription Date:  6/12/21 Levothyroxine 125 mcg  Last Fill Quantity: 45,  # refills: 1   Last office visit provider: 11/11/21          Last Written Prescription Date:  11/23/2020 Lisinopril   Last Fill Quantity: 90,  # refills: 1           Last Written Prescription Date:  2/4/21 Atorvastatin   Last Fill Quantity: 90,  # refills: 2           Last Written Prescription Date:  6/12/21 Levothyroxine 150 mcg  Last Fill Quantity: 45,  # refills: 1        Last Written Prescription Date:  2/28/21 Wellbutrin   Last Fill Quantity: 270,  # refills: 2         Requested Prescriptions   Pending Prescriptions Disp Refills     levothyroxine (SYNTHROID/LEVOTHROID) 125 MCG tablet [Pharmacy Med Name: LEVOTHYROXINE 125 MCG TABLET] 45 tablet 1     Sig: TAKE 1 TABLET BY MOUTH EVERY OTHER DAY, ALTERNATIVE WITH 150MCG       Thyroid Protocol Passed - 12/13/2021  9:12 AM        Passed - Patient is 12 years or older        Passed - Recent (12 mo) or future (30 days) visit within the authorizing provider's specialty     Patient has had an office visit with the authorizing provider or a provider within the authorizing providers department within the previous 12 mos or has a future within next 30 days. See \"Patient Info\" tab in inbasket, or \"Choose Columns\" in Meds & Orders section of the refill encounter.              Passed - Medication is active on med list        Passed - Normal TSH on file in past 12 months     Recent Labs   Lab Test 10/01/21  0811   TSH 1.51              Passed - No active pregnancy on record     If patient is pregnant or has had a positive pregnancy test, please check TSH.          Passed - No positive pregnancy test in past 12 months     If " "patient is pregnant or has had a positive pregnancy test, please check TSH.             lisinopril (ZESTRIL) 5 MG tablet [Pharmacy Med Name: LISINOPRIL 5 MG TABLET] 90 tablet 1     Sig: TAKE 1 TABLET BY MOUTH EVERY DAY       ACE Inhibitors (Including Combos) Protocol Passed - 12/13/2021  9:12 AM        Passed - Blood pressure under 140/90 in past 12 months     BP Readings from Last 3 Encounters:   11/11/21 120/76   08/19/21 117/65   05/25/21 118/64                 Passed - Recent (12 mo) or future (30 days) visit within the authorizing provider's specialty     Patient has had an office visit with the authorizing provider or a provider within the authorizing providers department within the previous 12 mos or has a future within next 30 days. See \"Patient Info\" tab in inbasket, or \"Choose Columns\" in Meds & Orders section of the refill encounter.              Passed - Medication is active on med list        Passed - Patient is age 18 or older        Passed - No active pregnancy on record        Passed - Normal serum creatinine on file in past 12 months     Recent Labs   Lab Test 11/11/21  1251   CR 0.85       Ok to refill medication if creatinine is low          Passed - Normal serum potassium on file in past 12 months     Recent Labs   Lab Test 11/11/21  1251   POTASSIUM 4.1             Passed - No positive pregnancy test within past 12 months           atorvastatin (LIPITOR) 40 MG tablet [Pharmacy Med Name: ATORVASTATIN 40 MG TABLET] 90 tablet 2     Sig: TAKE 1 TABLET BY MOUTH AT BEDTIME       Statins Protocol Passed - 12/13/2021  9:12 AM        Passed - LDL on file in past 12 months     Recent Labs   Lab Test 10/01/21  0811                Passed - No abnormal creatine kinase in past 12 months     No lab results found.             Passed - Recent (12 mo) or future (30 days) visit within the authorizing provider's specialty     Patient has had an office visit with the authorizing provider or a provider within " "the authorizing providers department within the previous 12 mos or has a future within next 30 days. See \"Patient Info\" tab in inbasket, or \"Choose Columns\" in Meds & Orders section of the refill encounter.              Passed - Medication is active on med list        Passed - Patient is age 18 or older        Passed - No active pregnancy on record        Passed - No positive pregnancy test in past 12 months           levothyroxine (SYNTHROID/LEVOTHROID) 150 MCG tablet [Pharmacy Med Name: LEVOTHYROXINE 150 MCG TABLET] 45 tablet 1     Sig: TAKE EVERY OTHER DAY AND ALTERNATE WITH 125 DOSE       Thyroid Protocol Passed - 12/13/2021  9:12 AM        Passed - Patient is 12 years or older        Passed - Recent (12 mo) or future (30 days) visit within the authorizing provider's specialty     Patient has had an office visit with the authorizing provider or a provider within the authorizing providers department within the previous 12 mos or has a future within next 30 days. See \"Patient Info\" tab in inbasket, or \"Choose Columns\" in Meds & Orders section of the refill encounter.              Passed - Medication is active on med list        Passed - Normal TSH on file in past 12 months     Recent Labs   Lab Test 10/01/21  0811   TSH 1.51              Passed - No active pregnancy on record     If patient is pregnant or has had a positive pregnancy test, please check TSH.          Passed - No positive pregnancy test in past 12 months     If patient is pregnant or has had a positive pregnancy test, please check TSH.             buPROPion (WELLBUTRIN XL) 150 MG 24 hr tablet [Pharmacy Med Name: BUPROPION HCL  MG TABLET] 270 tablet 2     Sig: TAKE 3 TABLETS BY MOUTH EVERY DAY       SSRIs Protocol Failed - 12/13/2021  9:12 AM        Failed - PHQ-9 score less than 5 in past 6 months     Please review last PHQ-9 score.           Passed - Medication is Bupropion     If the medication is Bupropion (Wellbutrin), and the patient is " "taking for smoking cessation; OK to refill.          Passed - Medication is active on med list        Passed - Patient is age 18 or older        Passed - No active pregnancy on record        Passed - No positive pregnancy test in last 12 months        Passed - Recent (6 mo) or future (30 days) visit within the authorizing provider's specialty     Patient had office visit in the last 6 months or has a visit in the next 30 days with authorizing provider or within the authorizing provider's specialty.  See \"Patient Info\" tab in inbasket, or \"Choose Columns\" in Meds & Orders section of the refill encounter.                 Shea Raya RN 12/15/21 8:36 AM  "

## 2022-01-05 ENCOUNTER — OFFICE VISIT (OUTPATIENT)
Dept: INTERNAL MEDICINE | Facility: CLINIC | Age: 67
End: 2022-01-05
Payer: COMMERCIAL

## 2022-01-05 VITALS
DIASTOLIC BLOOD PRESSURE: 76 MMHG | SYSTOLIC BLOOD PRESSURE: 138 MMHG | BODY MASS INDEX: 29.9 KG/M2 | OXYGEN SATURATION: 99 % | HEART RATE: 80 BPM | WEIGHT: 171.5 LBS

## 2022-01-05 DIAGNOSIS — R07.89 ATYPICAL CHEST PAIN: ICD-10-CM

## 2022-01-05 DIAGNOSIS — R30.0 DYSURIA: Primary | ICD-10-CM

## 2022-01-05 DIAGNOSIS — N63.0 LUMP OR MASS IN BREAST: ICD-10-CM

## 2022-01-05 DIAGNOSIS — Z92.29 PERSONAL HISTORY OF OTHER DRUG THERAPY: ICD-10-CM

## 2022-01-05 DIAGNOSIS — C50.212 MALIGNANT NEOPLASM OF UPPER-INNER QUADRANT OF LEFT BREAST IN FEMALE, ESTROGEN RECEPTOR POSITIVE (H): ICD-10-CM

## 2022-01-05 DIAGNOSIS — M81.0 SENILE OSTEOPOROSIS: ICD-10-CM

## 2022-01-05 DIAGNOSIS — Z17.0 MALIGNANT NEOPLASM OF UPPER-INNER QUADRANT OF LEFT BREAST IN FEMALE, ESTROGEN RECEPTOR POSITIVE (H): ICD-10-CM

## 2022-01-05 DIAGNOSIS — F33.1 MODERATE RECURRENT MAJOR DEPRESSION (H): ICD-10-CM

## 2022-01-05 LAB
ALBUMIN UR-MCNC: NEGATIVE MG/DL
APPEARANCE UR: CLEAR
BACTERIA #/AREA URNS HPF: ABNORMAL /HPF
BILIRUB UR QL STRIP: NEGATIVE
COLOR UR AUTO: YELLOW
GLUCOSE UR STRIP-MCNC: NEGATIVE MG/DL
HGB UR QL STRIP: ABNORMAL
KETONES UR STRIP-MCNC: NEGATIVE MG/DL
LEUKOCYTE ESTERASE UR QL STRIP: ABNORMAL
NITRATE UR QL: NEGATIVE
PH UR STRIP: 7.5 [PH] (ref 5–8)
RBC #/AREA URNS AUTO: ABNORMAL /HPF
SP GR UR STRIP: 1.01 (ref 1–1.03)
SQUAMOUS #/AREA URNS AUTO: ABNORMAL /LPF
UROBILINOGEN UR STRIP-ACNC: 0.2 E.U./DL
WBC #/AREA URNS AUTO: ABNORMAL /HPF

## 2022-01-05 PROCEDURE — 99215 OFFICE O/P EST HI 40 MIN: CPT | Performed by: INTERNAL MEDICINE

## 2022-01-05 PROCEDURE — 81001 URINALYSIS AUTO W/SCOPE: CPT | Performed by: INTERNAL MEDICINE

## 2022-01-05 RX ORDER — SULFAMETHOXAZOLE/TRIMETHOPRIM 800-160 MG
1 TABLET ORAL 2 TIMES DAILY
Qty: 12 TABLET | Refills: 3 | Status: SHIPPED | OUTPATIENT
Start: 2022-01-05 | End: 2022-02-16

## 2022-01-05 RX ORDER — ARIPIPRAZOLE 2 MG/1
1 TABLET ORAL DAILY
Qty: 30 TABLET | Refills: 1 | Status: SHIPPED | OUTPATIENT
Start: 2022-01-05 | End: 2022-02-16

## 2022-01-05 NOTE — PROGRESS NOTES
[facility] Follow Up Note    Assessment/Plan:  1. Dysuria  History of occasional UTI.  Now with symptoms.  Labs suggestive of UTI.  Recommendation: Bactrim prescribed.  She is given some additional refills if she has recurrent infection to self treat.  She will let me know if she has more than 4-5 UTIs per year.  - sulfamethoxazole-trimethoprim (BACTRIM DS) 800-160 MG tablet; Take 1 tablet by mouth 2 times daily  Dispense: 12 tablet; Refill: 3    2. Senile osteoporosis  Discussion was had today with regard to ongoing use of Prolia.  She has a history of osteoporosis and has had significant improvements.  She has chronic kidney disease/history of breast cancer/no recent fractures.  Options as follows: Continue Prolia for now/consider bisphosphonate therapy weekly/consider asymptomatic estrogen receptor modulator.  Patient is going to contemplate  - denosumab (PROLIA) injection 60 mg    3. Personal history of other drug therapy    - denosumab (PROLIA) injection 60 mg    4. Moderate recurrent major depression (H)  PHQ-9 equals 10-exacerbation of depression for variety of reasons.  Some external.  Not suicidal.  On Wellbutrin and BuSpar.  Recommendation: We will add very low-dose Abilify at 1 mg.  Follow-up via video visit in 4 weeks  - ARIPiprazole (ABILIFY) 2 MG tablet; Take 0.5 tablets (1 mg) by mouth daily  Dispense: 30 tablet; Refill: 1    5. Lump or mass in breast  Known history of breast cancer-known history of seroma on the left.  Normal mammogram in April.  Now with small nodular mass along the incision.  Recommendation: Diagnostic ultrasound and follow-up with breast surgeon  - MA Diagnostic Digital Left; Future  - US Breast Left Complete 4 Quadrants; Future    6. Malignant neoplasm of upper-inner quadrant of left breast in female, estrogen receptor positive (H)    - MA Diagnostic Digital Left; Future  - US Breast Left Complete 4 Quadrants; Future    7.  Atypical chest pain  Cardiology notes  reviewed.  Will await results of echocardiogram and coronary calcium scan for further risk factor modification.  Of note, she is hypertensive with hyperlipidemia.      Heidi Valencia MD, MD    Chief Complaint:  Follow-up-multiple issues    History of Present Illness:  Citnhya is a 66 year old female who is here today for follow-up with regard to a multitude of issues.  She does have a history of prior urinary tract infections.  She has some dysuria today and would like a urinalysis.    Also, she was seen in the emergency department in December for costochondritis and atypical chest pain.  A new left bundle branch block was noted.  She was referred to [facility] and is scheduled for an echocardiogram and coronary calcium scan.  Her chest pain was deemed noncardiac.  Risk factor modification will be based on coronary calcium scan.    She has noted some depression recently.  A variety of external causes are contributing i.e. the pandemic/loss of a friend from Covid/friend with cancer/etc.  PHQ-9 equals 10.  She is not sure if she should use medication.  She feels that she has all the therapy tools to work through this.  She has some issues with sleep.    Additionally, she has a history of breast cancer with new left breast lump.    She is also here for discussion of Prolia usage in the setting of osteoporosis with improvement to low bone mass.  Here to discuss whether she should continue with Prolia versus switch to another agent.    Review of Systems:  A comprehensive review of systems was performed and was otherwise negative    PFSH:  Social History:  with adult children.  History   Smoking Status     Former Smoker     Types: Cigarettes     Start date: 8/28/1971     Quit date: 5/18/1975   Smokeless Tobacco     Never Used     Comment: Smoked in 1975 for 1 year       Past History:   Past Medical History:   Diagnosis Date     Abnormal Pap smear      Breast cancer (H) 7/2007    T1N0 left breast cancer      Depressive disorder, not elsewhere classified     Buspar, wellbutrin zoloft  sees Dr. Lim      Lichen sclerosus et atrophicus      Pure hypercholesterolemia     Lipitor     Raynaud's disease      Unspecified essential hypertension      Unspecified hypothyroidism     synthroid   \  Current Outpatient Medications   Medication Sig Dispense Refill     ARIPiprazole (ABILIFY) 2 MG tablet Take 0.5 tablets (1 mg) by mouth daily 30 tablet 1     atorvastatin (LIPITOR) 40 MG tablet TAKE 1 TABLET BY MOUTH AT BEDTIME 90 tablet 2     buPROPion (WELLBUTRIN XL) 150 MG 24 hr tablet TAKE 3 TABLETS BY MOUTH EVERY  tablet 2     levothyroxine (SYNTHROID/LEVOTHROID) 125 MCG tablet TAKE 1 TABLET BY MOUTH EVERY OTHER DAY, ALTERNATIVE WITH 150MCG 45 tablet 1     levothyroxine (SYNTHROID/LEVOTHROID) 150 MCG tablet TAKE EVERY OTHER DAY AND ALTERNATE WITH 125 DOSE 45 tablet 1     sulfamethoxazole-trimethoprim (BACTRIM DS) 800-160 MG tablet Take 1 tablet by mouth 2 times daily 12 tablet 3     amLODIPine (NORVASC) 5 MG tablet Take 5 mg by mouth daily  11     aspirin 81 MG tablet Take 81 mg by mouth daily       busPIRone (BUSPAR) 15 MG tablet TAKE 1 TABLET BY MOUTH TWICE A DAY       Calcium Citrate 200 MG TABS Take 1 tablet by mouth daily       Cholecalciferol 100 MCG (4000 UT) CAPS Take 4,000 Units by mouth       clobetasol (TEMOVATE) 0.05 % external ointment Apply in thin layer to vulva as instructed, twice weekly.  Do not apply to face. 45 g 3     Denosumab (PROLIA SC)        famotidine (PEPCID) 40 MG tablet Take 1 tablet (40 mg) by mouth nightly as needed for heartburn 30 tablet 3     hydrocortisone 2.5 % cream PLEASE SEE ATTACHED FOR DETAILED DIRECTIONS       ketoconazole (NIZORAL) 2 % external cream PLEASE SEE ATTACHED FOR DETAILED DIRECTIONS       lisinopril (ZESTRIL) 2.5 MG tablet Take 2.5 mg by mouth daily       Magnesium Carbonate 250 MG/GM POWD Take 250 mg by mouth       magnesium oxide 200 MG TABS Take by mouth daily        melatonin 0.25 mg TABS quarter-tab        meloxicam (MOBIC) 15 MG tablet Take 1 tablet (15 mg) by mouth daily 14 tablet 0     Vitamin D, Cholecalciferol, 1000 UNITS TABS Take 2,000 Units by mouth daily         Family History:     Physical Exam:  General Appearance:   She appears quite well and in no acute distress  Vitals:    01/05/22 0741   BP: 138/76   BP Location: Left arm   Patient Position: Sitting   Cuff Size: Adult Large   Pulse: 80   SpO2: 99%   Weight: 77.8 kg (171 lb 8 oz)     Wt Readings from Last 3 Encounters:   01/05/22 77.8 kg (171 lb 8 oz)   11/11/21 76.6 kg (168 lb 14.4 oz)   08/19/21 76.6 kg (168 lb 12.8 oz)     Body mass index is 29.9 kg/m .    Breast examination reveals a firm nodular mass along the incision line of the left chest wall.    Data Review:    Analysis and Summary of Old Records (2): Reviewed ER and cardiology records    Records Requested (1):       Other History Summarized (from other people in the room) (2):     Radiology Tests Summarized (XRAY/CT/MRI/DXA) (1): Reviewed x-ray    Labs Reviewed (1): Reviewed labs from ED    Medicine Tests Reviewed (EKG/ECHO/COLONOSCOPY/EGD) (1): Reviewed EKG    Independent Review of EKG or X-RAY (2):     Time spent over 40 minutes on assessment of multiple medical problems

## 2022-01-11 ENCOUNTER — ANCILLARY PROCEDURE (OUTPATIENT)
Dept: MAMMOGRAPHY | Facility: CLINIC | Age: 67
End: 2022-01-11
Attending: INTERNAL MEDICINE
Payer: COMMERCIAL

## 2022-01-11 DIAGNOSIS — C50.212 MALIGNANT NEOPLASM OF UPPER-INNER QUADRANT OF LEFT BREAST IN FEMALE, ESTROGEN RECEPTOR POSITIVE (H): ICD-10-CM

## 2022-01-11 DIAGNOSIS — N63.0 LUMP OR MASS IN BREAST: ICD-10-CM

## 2022-01-11 DIAGNOSIS — Z17.0 MALIGNANT NEOPLASM OF UPPER-INNER QUADRANT OF LEFT BREAST IN FEMALE, ESTROGEN RECEPTOR POSITIVE (H): ICD-10-CM

## 2022-01-11 PROCEDURE — G0279 TOMOSYNTHESIS, MAMMO: HCPCS | Performed by: RADIOLOGY

## 2022-01-11 PROCEDURE — 77065 DX MAMMO INCL CAD UNI: CPT | Mod: LT | Performed by: RADIOLOGY

## 2022-01-11 PROCEDURE — 76642 ULTRASOUND BREAST LIMITED: CPT | Mod: LT | Performed by: RADIOLOGY

## 2022-02-03 ENCOUNTER — ALLIED HEALTH/NURSE VISIT (OUTPATIENT)
Dept: FAMILY MEDICINE | Facility: CLINIC | Age: 67
End: 2022-02-03
Payer: COMMERCIAL

## 2022-02-03 DIAGNOSIS — M19.90 OSTEOARTHRITIS: Primary | ICD-10-CM

## 2022-02-03 PROCEDURE — 96372 THER/PROPH/DIAG INJ SC/IM: CPT | Performed by: INTERNAL MEDICINE

## 2022-02-03 PROCEDURE — 99207 PR NO CHARGE NURSE ONLY: CPT

## 2022-02-16 ENCOUNTER — VIRTUAL VISIT (OUTPATIENT)
Dept: INTERNAL MEDICINE | Facility: CLINIC | Age: 67
End: 2022-02-16
Payer: COMMERCIAL

## 2022-02-16 DIAGNOSIS — I10 ESSENTIAL (PRIMARY) HYPERTENSION: ICD-10-CM

## 2022-02-16 DIAGNOSIS — E78.00 PURE HYPERCHOLESTEROLEMIA: ICD-10-CM

## 2022-02-16 DIAGNOSIS — R79.9 ABNORMAL FINDING OF BLOOD CHEMISTRY, UNSPECIFIED: ICD-10-CM

## 2022-02-16 DIAGNOSIS — E55.9 VITAMIN D DEFICIENCY: ICD-10-CM

## 2022-02-16 DIAGNOSIS — Z00.00 ENCOUNTER FOR PREVENTIVE CARE: ICD-10-CM

## 2022-02-16 DIAGNOSIS — Z86.16 PERSONAL HISTORY OF COVID-19: Primary | ICD-10-CM

## 2022-02-16 DIAGNOSIS — E03.9 ACQUIRED HYPOTHYROIDISM: ICD-10-CM

## 2022-02-16 DIAGNOSIS — I25.10 CORONARY ARTERY CALCIFICATION SEEN ON CAT SCAN: ICD-10-CM

## 2022-02-16 DIAGNOSIS — F33.1 MODERATE RECURRENT MAJOR DEPRESSION (H): ICD-10-CM

## 2022-02-16 PROCEDURE — 99214 OFFICE O/P EST MOD 30 MIN: CPT | Mod: TEL | Performed by: INTERNAL MEDICINE

## 2022-02-16 RX ORDER — ATORVASTATIN CALCIUM 80 MG/1
80 TABLET, FILM COATED ORAL DAILY
Qty: 90 TABLET | Refills: 3 | Status: SHIPPED | OUTPATIENT
Start: 2022-02-16 | End: 2022-12-02

## 2022-02-16 ASSESSMENT — PATIENT HEALTH QUESTIONNAIRE - PHQ9: SUM OF ALL RESPONSES TO PHQ QUESTIONS 1-9: 2

## 2022-02-16 NOTE — PROGRESS NOTES
Cinthya is a 66 year old female being evaluated via a billable phone visit, and would like to be contacted via the following  Cell number on file:    Telephone Information:   Mobile [phone]            ASSESSMENT and PLAN:  1. Personal history of COVID-19  She has recovered well from her recent history of COVID-19 in January.  No current sequelae    2. Moderate recurrent major depression (H)  Was seen and evaluated for moderate recurrent major depression.  A prescription for Abilify was ordered.  However, she felt that her situation contributed to her mood.  Since she is recovered from Covid, a remodeling project has ensued in her home, she is feeling much better.    Recommendation: Continue to monitor and remain on current medications    3. Essential (primary) hypertension  Had one episode of elevated blood pressure to 188-she took an extra Zestril/lisinopril.  Otherwise-BPs have been stable  Recommendation: Continue to monitor blood pressures.  Contact me if she is needing to take additional medication      4. Encounter for preventive care  She is due for an annual wellness in May.  We will do prephysical labs  - Hemoglobin A1c; Future  - Comprehensive metabolic panel; Future  - CBC with Platelets & Differential; Future    5. Acquired hypothyroidism  We will do prephysical labs  - Hemoglobin A1c; Future  - TSH with free T4 reflex; Future    6. Pure hypercholesterolemia/coronary artery calcifications  In the setting of coronary calcifications with a score of 49.  Echocardiogram within normal limits.  Recommendations: Continue with aspirin.  Increase atorvastatin to 80 mg.  Labs and lipids in 8 weeks  - Hemoglobin A1c; Future  - Comprehensive metabolic panel; Future  - CBC with Platelets & Differential; Future  - Lipid panel reflex to direct LDL Fasting; Future    7. Vitamin D deficiency    - Vitamin D Deficiency; Future    8. Abnormal finding of blood chemistry, unspecified     - Hemoglobin A1c; Future        Preventive Care Assessed:          Medication list carefully reviewed and corrected  Epic Merger Problem list addressed and updated    CHIEF COMPLAINT:  Chief Complaint   Patient presents with     Follow Up     4-6 weeks        HISTORY OF PRESENT ILLNESS:  Cinthya is a 66 year old female contacting the clinic today via phone for discussion and follow-up of the following medical problems:    Since last visit, she has had a Covid infection.  She has since recovered without sequelae.  She is feeling emotionally relieved that she got through this illness without any major problems.    Additionally, at last visit, she was experiencing an exacerbation of major depression.  Abilify was added to her current regimen.  She states she did not start the medication.  She developed Covid.  Shortly thereafter, a remodeling project began in her home.  She states that with recovery of Covid and feeling less daunting feelings about the pandemic, initiation of the remodeling project, her mood improved.  She states that she believes her depression at the time was very situational and she is doing better.  She continues to work on exercise.  She is not with any suicidal ideation/etc.    She does report that she had an elevation of blood pressure to 180.  She cannot identify any triggering causes.  She did take her blood pressure when resting.  She took an extra lisinopril and her blood pressure has improved.  She will continue to monitor.    She followed up with cardiology on her atypical chest pain.  She had an echocardiogram to look at left bundle branch block/coronary calcium scan/etc. these results are reviewed    REVIEW OF SYSTEMS:        PFSH:  Social History     Social History Narrative    Caffeine intake/servings daily - 3-4    Calcium intake/servings daily - 23-3 plus vitamins    Exercise 5-7 times weekly - describe walking,cardio    Sunscreen used - sometimes    Seatbelts used - Yes    Guns stored in the home - No    Self  "Breast Exam - Yes    Pap test up to date -  Yes    Eye exam up to date -  Yes    Dental exam up to date -  Yes    DEXA scan up to date -  Yes, 2010    Flex Sig/Colonoscopy up to date -  Yes, 2003    Mammography up to date -  Yes, 2009    Immunizations reviewed and up to date - Yes, 2010    Abuse: Current or Past (Physical, Sexual or Emotional) - No    Do you feel safe in your environment - Yes    Do you cope well with stress - Sometimes    Do you suffer from insomnia - Yes    Last updated by: Alexa Apodaca  5/6/2010                         TOBACCO USE:  History   Smoking Status     Former Smoker     Types: Cigarettes     Start date: 8/28/1971     Quit date: 5/18/1975   Smokeless Tobacco     Never Used     Comment: Smoked in 1975 for 1 year       VITALS:  There were no vitals filed for this visit.  LMP 06/07/2007  Estimated body mass index is 29.9 kg/m  as calculated from the following:    Height as of 11/11/21: 1.613 m (5' 3.5\").    Weight as of 1/5/22: 77.8 kg (171 lb 8 oz).    PHYSICAL EXAM:  (observations via Phone)  Her voice sounds clear and healthy and happy.  And blood pressure 104/88    MEDICATIONS  Current Outpatient Medications   Medication Sig Dispense Refill     amLODIPine (NORVASC) 5 MG tablet Take 5 mg by mouth daily  11     ARIPiprazole (ABILIFY) 2 MG tablet Take 0.5 tablets (1 mg) by mouth daily 30 tablet 1     aspirin 81 MG tablet Take 81 mg by mouth daily       atorvastatin (LIPITOR) 40 MG tablet TAKE 1 TABLET BY MOUTH AT BEDTIME 90 tablet 2     buPROPion (WELLBUTRIN XL) 150 MG 24 hr tablet TAKE 3 TABLETS BY MOUTH EVERY  tablet 2     busPIRone (BUSPAR) 15 MG tablet daily        Calcium Citrate 200 MG TABS Take 1 tablet by mouth daily       Cholecalciferol 100 MCG (4000 UT) CAPS Take 4,000 Units by mouth       clobetasol (TEMOVATE) 0.05 % external ointment Apply in thin layer to vulva as instructed, twice weekly.  Do not apply to face. 45 g 3     Denosumab (PROLIA SC)        famotidine " (PEPCID) 40 MG tablet Take 1 tablet (40 mg) by mouth nightly as needed for heartburn 30 tablet 3     hydrocortisone 2.5 % cream PLEASE SEE ATTACHED FOR DETAILED DIRECTIONS       ketoconazole (NIZORAL) 2 % external cream PLEASE SEE ATTACHED FOR DETAILED DIRECTIONS       levothyroxine (SYNTHROID/LEVOTHROID) 125 MCG tablet TAKE 1 TABLET BY MOUTH EVERY OTHER DAY, ALTERNATIVE WITH 150MCG 45 tablet 1     levothyroxine (SYNTHROID/LEVOTHROID) 150 MCG tablet TAKE EVERY OTHER DAY AND ALTERNATE WITH 125 DOSE 45 tablet 1     lisinopril (ZESTRIL) 2.5 MG tablet Take 2.5 mg by mouth daily       Magnesium Carbonate 250 MG/GM POWD Take 250 mg by mouth       melatonin 0.25 mg TABS quarter-tab        magnesium oxide 200 MG TABS Take by mouth daily       meloxicam (MOBIC) 15 MG tablet Take 1 tablet (15 mg) by mouth daily (Patient not taking: Reported on 2/16/2022) 14 tablet 0     sulfamethoxazole-trimethoprim (BACTRIM DS) 800-160 MG tablet Take 1 tablet by mouth 2 times daily (Patient not taking: Reported on 2/16/2022) 12 tablet 3     Vitamin D, Cholecalciferol, 1000 UNITS TABS Take 2,000 Units by mouth daily         Notes summarized:   Labs, x-rays, cardiology, GI tests reviewed:   Recent Labs   Lab Test 11/11/21  1251 10/01/21  0811 02/05/21  0819     --  144   POTASSIUM 4.1  --  4.6   CR 0.85  --  0.81   TSH  --  1.51 0.66     Lab Results   Component Value Date    [id number] Negative 12/02/2021    [id number] Not Detected 11/11/2021     No components found for: VITD  Lab Results   Component Value Date    CHOL 189 10/01/2021    CHOL 276 08/31/2012     New orders: No orders of the defined types were placed in this encounter.      Independent review of:  Supplemental history by:     Patient would like to receive their AVS by Vamsi    Cyndee FELIBERTO Wyatt  [facility]    Phone Start Time: 8:32 AM  Phone End time:  8:48 AM  Conversation plus orders: 16 minutes  Dictation time:  4 minutes    The visit lasted a  total of 20 minutes

## 2022-03-04 ENCOUNTER — TELEPHONE (OUTPATIENT)
Dept: ONCOLOGY | Facility: CLINIC | Age: 67
End: 2022-03-04
Payer: COMMERCIAL

## 2022-03-04 DIAGNOSIS — Z85.3 HX OF BREAST CANCER: Primary | ICD-10-CM

## 2022-03-04 DIAGNOSIS — Z80.3 FAMILY HISTORY OF MALIGNANT NEOPLASM OF BREAST: ICD-10-CM

## 2022-03-04 NOTE — TELEPHONE ENCOUNTER
3/4/2022    Referring Provider: Eloise Santana PA-C    Presenting Information:   I spoke with Yvonne over the phone today as she is ready to move forward with genetic testing. We reviewed that a prior authorization was submitted to her insurance for the CancerNext-Expanded panel (77 genes) through [employer] and the determination came back that no authorization was required for testing and she has a $0 OOP given her plan/ploicy. She expressed understanding and shared that she'd like to complete the testing.    Discussion:    We previously reviewed the details of Cinthya's family and personal history when we initially met on 10/11/2021. Please see the clinic note from our previous appointment for details.   Genetic testing is available for 77 genes associated with increased risk for many different cancers: CancerNext-Expanded (AIP, ALK, APC, RUKHSANA, AXIN2, BAP1, BARD1, BLM, BMPR1A, BRCA1, BRCA2, BRIP1, CDC73, CDH1, CDK4, CDKN1B, CDKN2A, CHEK2, CTNNA1, DICER1, EGFR, EGLN1, EPCAM, FANCC, FH, FLCN, GALNT12, GREM1, HOXB13, KIF1B, KIT, LZTR1, MAX, MEN1, MET, MITF, MLH1, MSH2, MSH3, MSH6, MUTYH, NBN, NF1, NF2, NTHL1, PALB2, PDGFRA, PHOX2B, PMS2, POLD1, POLE, POT1, HBFKP8N, PTCH1, PTEN, RAD51C, RAD51D, RB1, RECQL, RET, SDHA, SDHAF2, SDHB, SDHC, SDHD, SMAD4, SMARCA4, SMARCB1, SMARCE1, STK11, SUFU, KGCN276, TP53, TSC1, TSC2, VHL, and XRCC2).  We discussed that many of the genes in the CancerNext-Expanded panel are associated with specific hereditary cancer syndromes and have published management guidelines:  Hereditary Breast and Ovarian Cancer syndrome (BRCA1, BRCA2), Wise syndrome (MLH1, MSH2, MSH6, PMS2, EPCAM), Familial Adenomatous Polyposis (APC), Hereditary Diffuse Gastric Cancer (CDH1), Familial Atypical Multiple Mole Melanoma syndrome (CDK4, CDKN2A), Juvenile Polyposis syndrome (BMPR1A, SMAD4), Cowden syndrome (PTEN), Li Fraumeni syndrome (TP53), Peutz-Jeghers syndrome (STK11), MUTYH Associated Polyposis (MUTYH),  Hereditary Leiomyomatosis and Renal Cell Cancer (FH), Tmed-Zxql-Dsjq (FLCN), Hereditary Papillary Renal Carcinoma (MET), Hereditary Paraganglioma and Pheochromocytoma syndrome (SDHA, SDHAF2, SDHB, SDHC, SDHD), Multiple Endocrine Neoplasia type 2 (RET), Tuberous sclerosis complex (TSC1, TSC2), Von Hippel-Lindau disease (VHL), Neurofibromatosis type 1 (NF1), Neurofibromatosis type 2 (NF2), Multiple Endocrine Neoplasia type 1 (MEN1), Multiple Endocrine Neoplasia type 4 (CDKN1B), Gorlin syndrome (SUFU, PTCH1), and Islas complex (WMQKB9Y).  The RUKHSANA, AXIN2, BARD1, BRIP1, CHEK2, GREM1, MSH3, NBN, NTHL1, PALB2, POLD1, POLE, RAD51C, and RAD51D genes are associated with increased cancer risk and have published management guidelines for certain cancers.    The remaining genes (AIP, ALK, BAP1, BLM, CDC73, CTNNA1, DICER1, EGFR, EGLN1, FANCC, GALNT12, HOXB13, KIF1B, KIT, LZTR1, MAX, MITF, PDGFRA, PHOX2B, POT1, RECQL, RB1, SMARCA4, SMARCB1, SMARCE1, LTUY038, and XRCC2) are associated with increased cancer risk and may allow us to make medical recommendations when mutations are identified.  Consent was obtained over the phone with no witness required due to the current covid19 global pandemic.    Medical Management: For Cinthya, we reviewed that the information from genetic testing may determine:    additional cancer screening for which Cinthya may qualify (i.e. mammogram and breast MRI, more frequent colonoscopies, more frequent dermatologic exams, etc.),    options for risk reducing surgeries Cinthya could consider (i.e. bilateral mastectomy, etc.),      and targeted chemotherapies for Cinthya if she were to develop certain cancers in the future (i.e. immunotherapy for individuals with Wise syndrome, PARP inhibitors, etc.).     These recommendations will be discussed in detail once genetic testing is completed.     Given the current COVID19 global pandemic, we discussed genetic testing options for Cinthya including having [facility] send a saliva kit to her home or coming into the clinic for a blood draw. Cinthya opted to have a salivia kit sent to her home to complete the genetic testing. A kit was ordered from [facility] and a saliva collection kit will be sent to Cinthya's home address. I confirmed with Cinthya that the address we have on file is her current and correct address. We discussed some of the limitations of completing genetic testing via saliva and Cinthya was instructed to follow the instruction provided in the kit to ensure the best possibility of success for saliva genetic testing.     Plan:  1)  Today Cinthya elected to proceed with CancerNext-Expanded panel genetic testing through [facility].  2)  Results should be available approximately 3 weeks from the time the lab receives her saliva sample.   3) She will be contacted by our scheduling team to set up a virtual results visit as soon as results are available.     Face to face time: 15 minutes    Rosalind Byrd MS, [facility] [place]  Licensed, Certified Genetic Counselor  [facility]  [phone]  [email or web address]

## 2022-04-01 DIAGNOSIS — F34.1 DYSTHYMIC DISORDER: ICD-10-CM

## 2022-04-04 RX ORDER — BUSPIRONE HYDROCHLORIDE 15 MG/1
TABLET ORAL
Qty: 180 TABLET | Refills: 0 | Status: SHIPPED | OUTPATIENT
Start: 2022-04-04 | End: 2022-07-06

## 2022-04-04 NOTE — TELEPHONE ENCOUNTER
Outpatient Medication Detail     Disp Refills Start End ROB   busPIRone (BUSPAR) 15 MG tablet 180 tablet 2 2021  No   Sig - Route: Take 1 tablet (15 mg total) by mouth 2 (two) times a day. - Oral   Patient taking differently: Take 15 mg by mouth every morning.        Sent to pharmacy as: busPIRone 15 mg tablet (BUSPAR)   Notes to Pharmacy: DX Code Needed  PATIENT REQUESTING NEW RX, CURRENT ONE HAS . THANKS.   E-Prescribing Status: Receipt confirmed by pharmacy (2021  8:56 PM CST)       busPIRone (BUSPAR) 15 MG tablet [[id number]]    Electronically signed by: Pamela Zaragoza RN on 21 Status: Active   Ordering user: Pamela Zaragoza RN 21 Ordering provider: Heidi Valencia MD   Authorized by: Heidi Valencia MD   Frequency: BID 21 - Until Discontinued Released by: Pamela Zaragoza RN 21   Diagnoses  Dysthymic disorder [F34.1]   Medication comments: DX Code Needed  PATIENT REQUESTING NEW RX, CURRENT ONE HAS . THANKS.     Routing refill request to provider for review/approval because:  Patient reports taking medication differently.    Last office visit provider:  22     Requested Prescriptions   Pending Prescriptions Disp Refills     busPIRone (BUSPAR) 15 MG tablet [Pharmacy Med Name: BUSPIRONE HCL 15 MG TABLET] 180 tablet 2     Sig: TAKE 1 TABLET BY MOUTH TWICE A DAY       Atypical Antidepressants Protocol Passed - 2022  7:46 AM        Passed - Patient has PHQ-9 score less than 5 in past 6 months.     Please review last PHQ-9 score.           Passed - Medication active on med list        Passed - Patient is age 18 or older        Passed - No active pregnancy on record        Passed - No positive pregnancy test in past 12 mos        Passed - Recent (6 mo) or future (30 days) visit within the authorizing provider's specialty     Patient had office visit in the last 6 months or has a visit in the next 30 days with authorizing provider or  "within the authorizing provider's specialty.  See \"Patient Info\" tab in inbasket, or \"Choose Columns\" in Meds & Orders section of the refill encounter.                 Pedrito Garcia RN 04/04/22 7:46 AM  "

## 2022-05-06 ENCOUNTER — MYC MEDICAL ADVICE (OUTPATIENT)
Dept: INTERNAL MEDICINE | Facility: CLINIC | Age: 67
End: 2022-05-06
Payer: COMMERCIAL

## 2022-05-06 DIAGNOSIS — N39.0 URINARY TRACT INFECTION WITHOUT HEMATURIA, SITE UNSPECIFIED: Primary | ICD-10-CM

## 2022-05-09 ENCOUNTER — LAB (OUTPATIENT)
Dept: LAB | Facility: CLINIC | Age: 67
End: 2022-05-09
Payer: COMMERCIAL

## 2022-05-09 DIAGNOSIS — N39.0 URINARY TRACT INFECTION WITHOUT HEMATURIA, SITE UNSPECIFIED: ICD-10-CM

## 2022-05-09 DIAGNOSIS — Z00.00 ENCOUNTER FOR PREVENTIVE CARE: ICD-10-CM

## 2022-05-09 DIAGNOSIS — R79.9 ABNORMAL FINDING OF BLOOD CHEMISTRY, UNSPECIFIED: ICD-10-CM

## 2022-05-09 DIAGNOSIS — E78.00 PURE HYPERCHOLESTEROLEMIA: ICD-10-CM

## 2022-05-09 DIAGNOSIS — E03.9 ACQUIRED HYPOTHYROIDISM: ICD-10-CM

## 2022-05-09 DIAGNOSIS — E55.9 VITAMIN D DEFICIENCY: ICD-10-CM

## 2022-05-09 LAB
ALBUMIN SERPL-MCNC: 3.7 G/DL (ref 3.5–5)
ALBUMIN UR-MCNC: NEGATIVE MG/DL
ALP SERPL-CCNC: 71 U/L (ref 45–120)
ALT SERPL W P-5'-P-CCNC: 18 U/L (ref 0–45)
ANION GAP SERPL CALCULATED.3IONS-SCNC: 12 MMOL/L (ref 5–18)
APPEARANCE UR: CLEAR
AST SERPL W P-5'-P-CCNC: 18 U/L (ref 0–40)
BACTERIA #/AREA URNS HPF: ABNORMAL /HPF
BASOPHILS # BLD AUTO: 0.1 10E3/UL (ref 0–0.2)
BASOPHILS NFR BLD AUTO: 1 %
BILIRUB SERPL-MCNC: 0.8 MG/DL (ref 0–1)
BILIRUB UR QL STRIP: NEGATIVE
BUN SERPL-MCNC: 15 MG/DL (ref 8–22)
CALCIUM SERPL-MCNC: 9.1 MG/DL (ref 8.5–10.5)
CHLORIDE BLD-SCNC: 108 MMOL/L (ref 98–107)
CHOLEST SERPL-MCNC: 177 MG/DL
CO2 SERPL-SCNC: 24 MMOL/L (ref 22–31)
COLOR UR AUTO: YELLOW
CREAT SERPL-MCNC: 0.9 MG/DL (ref 0.6–1.1)
EOSINOPHIL # BLD AUTO: 0.4 10E3/UL (ref 0–0.7)
EOSINOPHIL NFR BLD AUTO: 5 %
ERYTHROCYTE [DISTWIDTH] IN BLOOD BY AUTOMATED COUNT: 12.4 % (ref 10–15)
FASTING STATUS PATIENT QL REPORTED: NO
GFR SERPL CREATININE-BSD FRML MDRD: 69 ML/MIN/1.73M2
GLUCOSE BLD-MCNC: 105 MG/DL (ref 70–125)
GLUCOSE UR STRIP-MCNC: NEGATIVE MG/DL
HBA1C MFR BLD: 5.6 % (ref 0–5.6)
HCT VFR BLD AUTO: 44.1 % (ref 35–47)
HDLC SERPL-MCNC: 59 MG/DL
HGB BLD-MCNC: 14.3 G/DL (ref 11.7–15.7)
HGB UR QL STRIP: ABNORMAL
IMM GRANULOCYTES # BLD: 0 10E3/UL
IMM GRANULOCYTES NFR BLD: 0 %
KETONES UR STRIP-MCNC: NEGATIVE MG/DL
LDLC SERPL CALC-MCNC: 90 MG/DL
LEUKOCYTE ESTERASE UR QL STRIP: NEGATIVE
LYMPHOCYTES # BLD AUTO: 1.5 10E3/UL (ref 0.8–5.3)
LYMPHOCYTES NFR BLD AUTO: 20 %
MCH RBC QN AUTO: 30.2 PG (ref 26.5–33)
MCHC RBC AUTO-ENTMCNC: 32.4 G/DL (ref 31.5–36.5)
MCV RBC AUTO: 93 FL (ref 78–100)
MONOCYTES # BLD AUTO: 0.5 10E3/UL (ref 0–1.3)
MONOCYTES NFR BLD AUTO: 7 %
NEUTROPHILS # BLD AUTO: 5 10E3/UL (ref 1.6–8.3)
NEUTROPHILS NFR BLD AUTO: 68 %
NITRATE UR QL: NEGATIVE
PH UR STRIP: 6 [PH] (ref 5–8)
PLATELET # BLD AUTO: 228 10E3/UL (ref 150–450)
POTASSIUM BLD-SCNC: 3.6 MMOL/L (ref 3.5–5)
PROT SERPL-MCNC: 6.7 G/DL (ref 6–8)
RBC # BLD AUTO: 4.73 10E6/UL (ref 3.8–5.2)
RBC #/AREA URNS AUTO: ABNORMAL /HPF
SODIUM SERPL-SCNC: 144 MMOL/L (ref 136–145)
SP GR UR STRIP: 1.02 (ref 1–1.03)
SQUAMOUS #/AREA URNS AUTO: ABNORMAL /LPF
TRIGL SERPL-MCNC: 142 MG/DL
TSH SERPL DL<=0.005 MIU/L-ACNC: 0.47 UIU/ML (ref 0.3–5)
UROBILINOGEN UR STRIP-ACNC: 0.2 E.U./DL
WBC # BLD AUTO: 7.4 10E3/UL (ref 4–11)
WBC #/AREA URNS AUTO: ABNORMAL /HPF
WBC CLUMPS #/AREA URNS HPF: PRESENT /HPF

## 2022-05-09 PROCEDURE — 80050 GENERAL HEALTH PANEL: CPT

## 2022-05-09 PROCEDURE — 36415 COLL VENOUS BLD VENIPUNCTURE: CPT

## 2022-05-09 PROCEDURE — 82306 VITAMIN D 25 HYDROXY: CPT

## 2022-05-09 PROCEDURE — 81001 URINALYSIS AUTO W/SCOPE: CPT

## 2022-05-09 PROCEDURE — 83036 HEMOGLOBIN GLYCOSYLATED A1C: CPT

## 2022-05-09 PROCEDURE — 80061 LIPID PANEL: CPT

## 2022-05-10 LAB — DEPRECATED CALCIDIOL+CALCIFEROL SERPL-MC: 59 UG/L (ref 20–75)

## 2022-05-31 ENCOUNTER — OFFICE VISIT (OUTPATIENT)
Dept: INTERNAL MEDICINE | Facility: CLINIC | Age: 67
End: 2022-05-31
Payer: COMMERCIAL

## 2022-05-31 VITALS
DIASTOLIC BLOOD PRESSURE: 64 MMHG | SYSTOLIC BLOOD PRESSURE: 108 MMHG | OXYGEN SATURATION: 98 % | HEIGHT: 64 IN | BODY MASS INDEX: 29.16 KG/M2 | HEART RATE: 70 BPM | WEIGHT: 170.8 LBS | RESPIRATION RATE: 20 BRPM

## 2022-05-31 DIAGNOSIS — I25.10 CORONARY ARTERY CALCIFICATION SEEN ON CAT SCAN: ICD-10-CM

## 2022-05-31 DIAGNOSIS — E03.9 ACQUIRED HYPOTHYROIDISM: ICD-10-CM

## 2022-05-31 DIAGNOSIS — M81.0 SENILE OSTEOPOROSIS: ICD-10-CM

## 2022-05-31 DIAGNOSIS — F51.01 PRIMARY INSOMNIA: ICD-10-CM

## 2022-05-31 DIAGNOSIS — F33.1 MODERATE RECURRENT MAJOR DEPRESSION (H): ICD-10-CM

## 2022-05-31 DIAGNOSIS — E55.9 VITAMIN D DEFICIENCY: ICD-10-CM

## 2022-05-31 DIAGNOSIS — Z00.00 ENCOUNTER FOR PREVENTIVE CARE: Primary | ICD-10-CM

## 2022-05-31 DIAGNOSIS — I10 ESSENTIAL (PRIMARY) HYPERTENSION: ICD-10-CM

## 2022-05-31 DIAGNOSIS — Z51.81 MEDICATION MONITORING ENCOUNTER: ICD-10-CM

## 2022-05-31 PROCEDURE — G0438 PPPS, INITIAL VISIT: HCPCS | Performed by: INTERNAL MEDICINE

## 2022-05-31 RX ORDER — TRAZODONE HYDROCHLORIDE 50 MG/1
50 TABLET, FILM COATED ORAL AT BEDTIME
Qty: 90 TABLET | Refills: 1 | Status: SHIPPED | OUTPATIENT
Start: 2022-05-31 | End: 2022-11-25

## 2022-05-31 RX ORDER — EZETIMIBE 10 MG/1
10 TABLET ORAL DAILY
Qty: 30 TABLET | Refills: 3 | Status: SHIPPED | OUTPATIENT
Start: 2022-05-31 | End: 2022-12-02

## 2022-05-31 ASSESSMENT — ENCOUNTER SYMPTOMS
MYALGIAS: 1
PALPITATIONS: 0
SHORTNESS OF BREATH: 0
HEARTBURN: 1
DIARRHEA: 1
EYE PAIN: 0
NAUSEA: 0
PARESTHESIAS: 0
CHILLS: 0
HEMATURIA: 0
DYSURIA: 0
FEVER: 0
ARTHRALGIAS: 1
WEAKNESS: 0
CONSTIPATION: 1
DIZZINESS: 0
NERVOUS/ANXIOUS: 1
JOINT SWELLING: 0
HEMATOCHEZIA: 0
BREAST MASS: 0
ABDOMINAL PAIN: 0
SORE THROAT: 0
COUGH: 0
HEADACHES: 0
FREQUENCY: 1

## 2022-05-31 ASSESSMENT — ACTIVITIES OF DAILY LIVING (ADL): CURRENT_FUNCTION: NO ASSISTANCE NEEDED

## 2022-05-31 NOTE — PROGRESS NOTES
ANNUAL WELLNESS VISIT--Face to Face    Assessment and Plan:     ASSESSMENT and PLAN:  1. Encounter for preventive care  She is up-to-date on ophthalmologic, dental and Derm care.  She did have a COVID fourth shot.  She is exercising vigilantly and regularly.  Weight is stable.    2. Essential (primary) hypertension  Stable on current medications-we will continue the same    3. Acquired hypothyroidism  Stable labs    4. Coronary artery calcification seen on CAT scan  Lipitor increased to 80 mg in February.  Would like to see LDL closer to 70.  Will add in Zetia  - Lipid panel reflex to direct LDL Fasting; Future  - ezetimibe (ZETIA) 10 MG tablet; Take 1 tablet (10 mg) by mouth daily  Dispense: 30 tablet; Refill: 3    5. Vitamin D deficiency  Stable    6. Moderate recurrent major depression (H)  Stable- however-insomnia contributing to fatigue and lower mood.  See below    7. Primary insomnia  Has tolerated trazodone in the past well at  - traZODone (DESYREL) 50 MG tablet; Take 1 tablet (50 mg) by mouth At Bedtime  Dispense: 90 tablet; Refill: 1    8. Senile osteoporosis  With improvement from osteoporosis to low bone mass with moderate fracture risk.  We will complete Prolia this year and segue into oral bisphosphonates.  She will be due for this in February.  Would like to see bone mineral density before cessation of Prolia  - DX Hip/Pelvis/Spine; Future    9. Medication monitoring encounter  As above  - DX Hip/Pelvis/Spine; Future              Subjective:   Cinthya is a 66 year old female who presents to the clinic today for an Annual Wellness Visit.    CHIEF COMPLAINT:  Chief Complaint   Patient presents with     Wellness Visit       HPI: Cinthya is a delightful 66-year-old female who is status post treatment for breast cancer-completed in 2019.  She is here today for an annual wellness visit.  Overall, she is doing well.  Her stable medical problems are reviewed and she has no new symptoms.    Laboratory studies  were acquired at last visit and were reviewed with her today.    Health maintenance is reviewed.  It was noted in the chart.    Lifestyle is reviewed, she is active and exercises very regularly.  She enjoys biking, exercise classes/etc.  She eats a very healthful diet.    Review of Systems: Comprehensive review of systems is performed and is negative.  She did have some gastrointestinal side effects with initiation of increased doses of Lipitor.    Patient Care Team:  Heidi Valencia MD as PCP - General (Internal Medicine)  Cyndee Caballero MD as MD (OB/Gyn)  Eloise Santana PA-C as Assigned Cancer Care Provider  Heidi Valencia MD as Assigned PCP     Patient Active Problem List   Diagnosis     Hypothyroidism     Essential hypertension     Pure hypercholesterolemia     Depressive disorder, not elsewhere classified     HYPERLIPIDEMIA LDL GOAL <130     ACP (advance care planning)     Pulmonary nodule     Malignant neoplasm of upper-inner quadrant of left breast in female, estrogen receptor positive (H)     S/P laparoscopic hysterectomy     Status post hysterectomy     Lichen sclerosus     Coronary artery calcification seen on CAT scan     Past Medical History:   Diagnosis Date     Abnormal Pap smear      Breast cancer (H) 2007    T1N0 left breast cancer     Depressive disorder, not elsewhere classified     Buspar, wellbutrin zoloft  sees Dr. Lim      Lichen sclerosus et atrophicus      Pure hypercholesterolemia     Lipitor     Raynaud's disease      Unspecified essential hypertension      Unspecified hypothyroidism     synthroid      Past Surgical History:   Procedure Laterality Date     ABDOMEN SURGERY      c- section x 2     APPENDECTOMY       APPENDECTOMY       BIOPSY       BREAST LUMPECTOMY, RT/LT  2007    Breast Lumpectomy /LT     BREAST SURGERY      lumpectomy      SECTION       COLONOSCOPY       CYSTOSCOPY N/A 8/3/2018    Procedure: CYSTOSCOPY;;  Surgeon: Cyndee Caballero MD;   Location: [facility] OR     DAVINCI HYSTERECTOMY TOTAL, BILATERAL SALPINGO-OOPHORECTOMY, COMBINED Bilateral 8/3/2018    Procedure: COMBINED DAVINCI HYSTERECTOMY TOTAL, SALPINGO-OOPHORECTOMY;  Davinci Total Laparoscopic Hysterectomy, Bilateral Salpingo Oophorectomy, and Cystoscopy ;  Surgeon: Cyndee Caballero MD;  Location: [facility] OR     DILATION AND CURETTAGE, OPERATIVE HYSTEROSCOPY WITH MORCELLATOR, COMBINED N/A 10/28/2015    Procedure: COMBINED DILATION AND CURETTAGE, OPERATIVE HYSTEROSCOPY WITH MORCELLATOR;  Surgeon: Cyndee Wyman MD;  Location: [facility] OR     HYSTERECTOMY       [facility] APPENDECTOMY      Description: Appendectomy;  Recorded: 10/25/2011;     [facility]  DELIVERY ONLY      Description:  Section;  Recorded: 10/25/2011;  Comments: x2.     [facility] NONSPECIFIC PROCEDURE  10/82,     x2     [facility] NONSPECIFIC PROCEDURE      Appendicitis     Z NONSPECIFIC PROCEDURE      Hysteroscopy, D & C      Family History   Problem Relation Age of Onset     Neurologic Disorder Mother         migraines     Breast Cancer Mother      Heart Disease Father      Diabetes Father      Hypertension Father      Cerebrovascular Disease Father      Thyroid Disease Father      Heart Disease Sister      Heart Disease Brother      Genitourinary Problems Sister         gallstones     Depression Sister      Depression Brother      Depression Sister      Depression Brother      Diabetes Brother      Cerebrovascular Disease Mother      Cerebrovascular Disease Brother       Social History     Socioeconomic History     Marital status:      Spouse name: Not on file     Number of children: Not on file     Years of education: Not on file     Highest education level: Not on file   Occupational History     Not on file   Tobacco Use     Smoking status: Former Smoker     Types: Cigarettes     Start date: 1971     Quit date: 1975     Years since quittin.0     Smokeless tobacco: Never Used     Tobacco comment:  Smoked in 1975 for 1 year   Substance and Sexual Activity     Alcohol use: Not on file     Comment: occ     Drug use: Not on file     Sexual activity: Not on file     Comment: vasectomy   Other Topics Concern     Not on file   Social History Narrative    Caffeine intake/servings daily - 3-4    Calcium intake/servings daily - 23-3 plus vitamins    Exercise 5-7 times weekly - describe walking,cardio    Sunscreen used - sometimes    Seatbelts used - Yes    Guns stored in the home - No    Self Breast Exam - Yes    Pap test up to date -  Yes    Eye exam up to date -  Yes    Dental exam up to date -  Yes    DEXA scan up to date -  Yes, 2010    Flex Sig/Colonoscopy up to date -  Yes, 2003    Mammography up to date -  Yes, 2009    Immunizations reviewed and up to date - Yes, 2010    Abuse: Current or Past (Physical, Sexual or Emotional) - No    Do you feel safe in your environment - Yes    Do you cope well with stress - Sometimes    Do you suffer from insomnia - Yes    Last updated by: Alexa Apodaca  5/6/2010                     Social Determinants of Health     Financial Resource Strain: Not on file   Food Insecurity: Not on file   Transportation Needs: Not on file   Physical Activity: Not on file   Stress: Not on file   Social Connections: Not on file   Intimate Partner Violence: Not on file   Housing Stability: Not on file      Social History     Social History Narrative    Caffeine intake/servings daily - 3-4    Calcium intake/servings daily - 23-3 plus vitamins    Exercise 5-7 times weekly - describe walking,cardio    Sunscreen used - sometimes    Seatbelts used - Yes    Guns stored in the home - No    Self Breast Exam - Yes    Pap test up to date -  Yes    Eye exam up to date -  Yes    Dental exam up to date -  Yes    DEXA scan up to date -  Yes, 2010    Flex Sig/Colonoscopy up to date -  Yes, 2003    Mammography up to date -  Yes, 2009    Immunizations reviewed and up to date - Yes, 2010    Abuse: Current or Past  "(Physical, Sexual or Emotional) - No    Do you feel safe in your environment - Yes    Do you cope well with stress - Sometimes    Do you suffer from insomnia - Yes    Last updated by: Alexa Apodaca  5/6/2010                       Current Outpatient Medications   Medication Sig Dispense Refill     amLODIPine (NORVASC) 5 MG tablet Take 5 mg by mouth daily  11     aspirin 81 MG tablet Take 81 mg by mouth daily       atorvastatin (LIPITOR) 80 MG tablet Take 1 tablet (80 mg) by mouth daily 90 tablet 3     buPROPion (WELLBUTRIN XL) 150 MG 24 hr tablet TAKE 3 TABLETS BY MOUTH EVERY  tablet 2     busPIRone (BUSPAR) 15 MG tablet TAKE 1 TABLET BY MOUTH TWICE A  tablet 0     Calcium Citrate 200 MG TABS Take 1 tablet by mouth daily       Cholecalciferol 100 MCG (4000 UT) CAPS Take 4,000 Units by mouth       clobetasol (TEMOVATE) 0.05 % external ointment Apply in thin layer to vulva as instructed, twice weekly.  Do not apply to face. 45 g 3     famotidine (PEPCID) 40 MG tablet Take 1 tablet (40 mg) by mouth nightly as needed for heartburn 30 tablet 3     hydrocortisone 2.5 % cream PLEASE SEE ATTACHED FOR DETAILED DIRECTIONS       ketoconazole (NIZORAL) 2 % external cream PLEASE SEE ATTACHED FOR DETAILED DIRECTIONS       levothyroxine (SYNTHROID/LEVOTHROID) 125 MCG tablet TAKE 1 TABLET BY MOUTH EVERY OTHER DAY, ALTERNATIVE WITH 150MCG 45 tablet 1     levothyroxine (SYNTHROID/LEVOTHROID) 150 MCG tablet TAKE EVERY OTHER DAY AND ALTERNATE WITH 125 DOSE 45 tablet 1     lisinopril (ZESTRIL) 2.5 MG tablet Take 2.5 mg by mouth daily       Magnesium Carbonate 250 MG/GM POWD Take 250 mg by mouth       Denosumab (PROLIA SC)        melatonin 0.25 mg TABS quarter-tab         Objective:   LMP 06/07/2007  Estimated body mass index is 29.9 kg/m  as calculated from the following:    Height as of 11/11/21: 1.613 m (5' 3.5\").    Weight as of 1/5/22: 77.8 kg (171 lb 8 oz).    VisionScreening:  No exam data present     PHYSICAL " EXAM:  Wearing mask when entering room?  Yes  EYES: Eyelids, conjunctiva, and sclera were normal. Pupils were normal. Cornea, iris, and lens were normal bilaterally.  HEAD, EARS, NOSE, MOUTH, AND THROAT: Head and face were normal. Hearing was normal to voice and the ears were normal to external exam. Nose appearance was normal and there was no discharge. Oropharynx was normal.  TMs were normal.  NECK: Neck appearance was normal. There were no neck masses and the thyroid was not enlarged.  RESPIRATORY: Breathing pattern was normal and the chest moved symmetrically.   Lung sounds were equal bilaterally.  CARDIOVASCULAR: Heart rate and rhythm were normal.  S1 and S2 were normal and there were no extra sounds or murmurs. Peripheral pulses in arms and legs were normal.  Jugular venous pressure was normal.  There was no peripheral edema.  GASTROINTESTINAL: The abdomen was normal in contour.  Bowel sounds were present.   Palpation detected no tenderness, mass, or enlarged organs.   MUSCULOSKELETAL: Skeletal configuration was normal and muscle mass was normal for age. Joint appearance was overall normal.  LYMPHATIC: There were no enlarged nodes.  SKIN/HAIR/NAILS: Skin color was normal.  There were no abnormal skin lesions.  Hair and nails were normal.  NEUROLOGIC: The patient was alert and oriented to person, place, time, and circumstance. Speech was normal. Cranial nerves were normal. Motor strength was normal for age. The patient was normally coordinated.  PSYCHIATRIC:  Mood and affect were normal and the patient had normal recent and remote memory. The patient's judgment and insight were normal.      A breast exam was performed and within normal limits      Review Of Systems  Answers for HPI/ROS submitted by the patient on 5/31/2022  abdominal pain: No  Blood in stool: No  Blood in urine: No  chest pain: No  chills: No  congestion: No  constipation: Yes  cough: No  diarrhea: Yes  dizziness: No  ear pain: No  eye pain:  "No  nervous/anxious: Yes  fever: No  frequency: Yes  genital sores: No  headaches: No  hearing loss: Yes  heartburn: Yes  arthralgias: Yes  joint swelling: No  peripheral edema: No  mood changes: Yes  myalgias: Yes  nausea: No  dysuria: No  palpitations: No  Skin sensation changes: No  sore throat: No  urgency: No  rash: Yes  shortness of breath: No  visual disturbance: No  weakness: No  pelvic pain: No  vaginal bleeding: No  vaginal discharge: No  tenderness: No  breast mass: No  breast discharge: No      Recent Labs   Lab Test 05/09/22  1502 11/11/21  1251 10/01/21  0811   CHOL 177  --  189    96  --    VITDT 59  --   --        Lab Results   Component Value Date    VITDT 59 05/09/2022       No flowsheet data found.  Cognitive Screening   1) Repeat 3 items (Leader, Season, Table)    2) Clock draw: NORMAL  3) 3 item recall: Recalls 3 objects  Results: 3 items recalled: COGNITIVE IMPAIRMENT LESS LIKELY    Mini-CogTM Copyright HENOK Richey. Licensed by the author for use in [place] [employer]; reprinted with permission (pro@[facility]). All rights reserved.    A Mini-Cog score of 0-2 suggests the possibility of dementia, score of 3-5 suggests no dementia    ROOMING STAFF:    Patient has been advised of split billing requirements and indicates understanding: Yes   Are you in the first 12 months of your Medicare coverage?  No      Do you feel safe in your environment? Yes      Have you ever done Advance Care Planning? (For example, a Health Directive, POLST, or a discussion with a medical provider or your loved ones about your wishes): Yes, advance care planning is on file.    Healthy Habits:     In general, how would you rate your overall health?  Good    Frequency of exercise:  4-5 days/week    Duration of exercise:  45-60 minutes    Do you usually eat at least 4 servings of fruit and vegetables a day, include whole grains    & fiber and avoid regularly eating high fat or \"junk\" foods?  Yes    Taking medications " regularly:  Yes    Medication side effects:  None    Ability to successfully perform activities of daily living:  No assistance needed    Home Safety:  No safety concerns identified    Hearing Impairment:  Difficulty understanding soft or whispered speech    In the past 6 months, have you been bothered by leaking of urine? Yes    In general, how would you rate your overall mental or emotional health?  Fair      PHQ-2 Total Score: 2    Additional concerns today:  Yes      Do you have sleep apnea, excessive snoring or daytime drowsiness?: no    The patient's current medical problems were reviewed.      Weight management plan: Discussed healthy diet and exercise guidelines    The following health maintenance items are reviewed in Epic and correct as of today:  Health Maintenance Due   Topic Date Due     ANNUAL REVIEW OF HM ORDERS  Never done     DEPRESSION ACTION PLAN  Never done     MEDICARE ANNUAL WELLNESS VISIT  05/25/2022       Appropriate preventive services were discussed with this patient, including applicable screening as appropriate for cardiovascular disease, diabetes, osteopenia/osteoporosis, and glaucoma.  As appropriate for age/gender, discussed screening for colorectal cancer, prostate cancer, breast cancer, and cervical cancer. Checklist reviewing preventive services available has been given to the patient.    Reviewed patients plan of care and provided an AVS. The Basic Care Plan for Cinthya meets the Care Plan requirement. This Care Plan has been established and reviewed with the Patient, and printed in the AVS.    The following health maintenance schedule was reviewed with the patient and provided in printed form in the after visit summary:   Health Maintenance   Topic Date Due     ANNUAL REVIEW OF HM ORDERS  Never done     DEPRESSION ACTION PLAN  Never done     MEDICARE ANNUAL WELLNESS VISIT  05/25/2022     PHQ-9  08/16/2022     MAMMO SCREENING  01/11/2023     TSH W/FREE T4 REFLEX  05/09/2023     FALL  RISK ASSESSMENT  05/31/2023     DEXA  04/05/2024     ADVANCE CARE PLANNING  05/25/2026     LIPID  05/09/2027     DTAP/TDAP/TD IMMUNIZATION (4 - Td or Tdap) 09/24/2029     COLORECTAL CANCER SCREENING  05/27/2031     HEPATITIS C SCREENING  Completed     INFLUENZA VACCINE  Completed     Pneumococcal Vaccine: 65+ Years  Completed     ZOSTER IMMUNIZATION  Completed     COVID-19 Vaccine  Completed     IPV IMMUNIZATION  Aged Out     MENINGITIS IMMUNIZATION  Aged Out     HEPATITIS B IMMUNIZATION  Aged Out              Reviewed and updated as needed this visit by clinical staff     Meds                Heidi Valencia MD  [facility]

## 2022-06-22 DIAGNOSIS — I25.10 CORONARY ARTERY CALCIFICATION SEEN ON CAT SCAN: ICD-10-CM

## 2022-06-23 RX ORDER — EZETIMIBE 10 MG/1
10 TABLET ORAL DAILY
Qty: 30 TABLET | Refills: 3 | OUTPATIENT
Start: 2022-06-23

## 2022-07-01 DIAGNOSIS — E03.9 HYPOTHYROIDISM, UNSPECIFIED: ICD-10-CM

## 2022-07-01 RX ORDER — LEVOTHYROXINE SODIUM 150 UG/1
TABLET ORAL
Qty: 45 TABLET | Refills: 2 | Status: SHIPPED | OUTPATIENT
Start: 2022-07-01

## 2022-07-01 RX ORDER — LEVOTHYROXINE SODIUM 125 UG/1
TABLET ORAL
Qty: 45 TABLET | Refills: 2 | Status: SHIPPED | OUTPATIENT
Start: 2022-07-01

## 2022-07-01 NOTE — TELEPHONE ENCOUNTER
"Last Written Prescription Date:  12/15/21  Last Fill Quantity: 45,  # refills: 1   Last office visit provider:  5/31/22     Requested Prescriptions   Pending Prescriptions Disp Refills     levothyroxine (SYNTHROID/LEVOTHROID) 125 MCG tablet [Pharmacy Med Name: LEVOTHYROXINE 125 MCG TABLET] 45 tablet 1     Sig: TAKE 1 TABLET BY MOUTH EVERY OTHER DAY, ALTERNATIVE WITH 150MCG       Thyroid Protocol Passed - 7/1/2022  1:03 AM        Passed - Patient is 12 years or older        Passed - Recent (12 mo) or future (30 days) visit within the authorizing provider's specialty     Patient has had an office visit with the authorizing provider or a provider within the authorizing providers department within the previous 12 mos or has a future within next 30 days. See \"Patient Info\" tab in inbasket, or \"Choose Columns\" in Meds & Orders section of the refill encounter.              Passed - Medication is active on med list        Passed - Normal TSH on file in past 12 months     Recent Labs   Lab Test 05/09/22  1502   TSH 0.47              Passed - No active pregnancy on record     If patient is pregnant or has had a positive pregnancy test, please check TSH.          Passed - No positive pregnancy test in past 12 months     If patient is pregnant or has had a positive pregnancy test, please check TSH.             levothyroxine (SYNTHROID/LEVOTHROID) 150 MCG tablet [Pharmacy Med Name: LEVOTHYROXINE 150 MCG TABLET] 45 tablet 1     Sig: TAKE 1 TABLET BY MOUTH EVERY OTHER DAY AND ALTERNATE WITH 125 DOSE       Thyroid Protocol Passed - 7/1/2022  1:03 AM        Passed - Patient is 12 years or older        Passed - Recent (12 mo) or future (30 days) visit within the authorizing provider's specialty     Patient has had an office visit with the authorizing provider or a provider within the authorizing providers department within the previous 12 mos or has a future within next 30 days. See \"Patient Info\" tab in inbasket, or \"Choose Columns\" " in Meds & Orders section of the refill encounter.              Passed - Medication is active on med list        Passed - Normal TSH on file in past 12 months     Recent Labs   Lab Test 05/09/22  1502   TSH 0.47              Passed - No active pregnancy on record     If patient is pregnant or has had a positive pregnancy test, please check TSH.          Passed - No positive pregnancy test in past 12 months     If patient is pregnant or has had a positive pregnancy test, please check TSH.               Lona Bacon RN 07/01/22 11:10 AM

## 2022-07-03 DIAGNOSIS — I10 ESSENTIAL (PRIMARY) HYPERTENSION: ICD-10-CM

## 2022-07-04 RX ORDER — AMLODIPINE BESYLATE 5 MG/1
5 TABLET ORAL DAILY
Qty: 90 TABLET | Refills: 3 | Status: SHIPPED | OUTPATIENT
Start: 2022-07-04 | End: 2022-12-05

## 2022-07-04 NOTE — TELEPHONE ENCOUNTER
"Outpatient Medication Detail     Disp Refills Start End ROB   amLODIPine (NORVASC) 5 MG tablet 90 tablet 3 7/9/2021  No   Sig: TAKE 1 TABLET BY MOUTH EVERY DAY   Sent to pharmacy as: amLODIPine 5 mg tablet (NORVASC)   E-Prescribing Status: Receipt confirmed by pharmacy (7/9/2021  8:01 AM CDT)     Last office visit provider:  5/31/22     Requested Prescriptions   Pending Prescriptions Disp Refills     amLODIPine (NORVASC) 5 MG tablet [Pharmacy Med Name: AMLODIPINE BESYLATE 5 MG TAB] 90 tablet 3     Sig: TAKE 1 TABLET BY MOUTH EVERY DAY       Calcium Channel Blockers Protocol  Passed - 7/4/2022  8:49 AM        Passed - Blood pressure under 140/90 in past 12 months     BP Readings from Last 3 Encounters:   05/31/22 108/64   01/05/22 138/76   11/11/21 120/76                 Passed - Recent (12 mo) or future (30 days) visit within the authorizing provider's specialty     Patient has had an office visit with the authorizing provider or a provider within the authorizing providers department within the previous 12 mos or has a future within next 30 days. See \"Patient Info\" tab in inbasket, or \"Choose Columns\" in Meds & Orders section of the refill encounter.              Passed - Medication is active on med list        Passed - Patient is age 18 or older        Passed - No active pregnancy on record        Passed - Normal serum creatinine on file in past 12 months     Recent Labs   Lab Test 05/09/22  1502   CR 0.90       Ok to refill medication if creatinine is low          Passed - No positive pregnancy test in past 12 months             Pedrito Gracia RN 07/04/22 8:49 AM  "

## 2022-07-05 DIAGNOSIS — F34.1 DYSTHYMIC DISORDER: ICD-10-CM

## 2022-07-06 RX ORDER — BUSPIRONE HYDROCHLORIDE 15 MG/1
TABLET ORAL
Qty: 180 TABLET | Refills: 0 | Status: SHIPPED | OUTPATIENT
Start: 2022-07-06 | End: 2022-09-26

## 2022-07-06 NOTE — TELEPHONE ENCOUNTER
"Last Written Prescription Date:  4/4/22  Last Fill Quantity: 180,  # refills: 0   Last office visit provider:  5/31/22     Requested Prescriptions   Pending Prescriptions Disp Refills     busPIRone (BUSPAR) 15 MG tablet [Pharmacy Med Name: BUSPIRONE HCL 15 MG TABLET] 180 tablet 0     Sig: TAKE 1 TABLET BY MOUTH TWICE A DAY       Atypical Antidepressants Protocol Passed - 7/5/2022  8:12 PM        Passed - Patient has PHQ-9 score less than 5 in past 6 months.     Please review last PHQ-9 score.           Passed - Medication active on med list        Passed - Patient is age 18 or older        Passed - No active pregnancy on record        Passed - No positive pregnancy test in past 12 mos        Passed - Recent (6 mo) or future (30 days) visit within the authorizing provider's specialty     Patient had office visit in the last 6 months or has a visit in the next 30 days with authorizing provider or within the authorizing provider's specialty.  See \"Patient Info\" tab in inbasket, or \"Choose Columns\" in Meds & Orders section of the refill encounter.                 Lona Bacon RN 07/06/22 6:23 PM  "

## 2022-07-22 ENCOUNTER — TRANSFERRED RECORDS (OUTPATIENT)
Dept: HEALTH INFORMATION MANAGEMENT | Facility: CLINIC | Age: 67
End: 2022-07-22

## 2022-08-19 ENCOUNTER — OFFICE VISIT (OUTPATIENT)
Dept: OBGYN | Facility: CLINIC | Age: 67
End: 2022-08-19
Attending: OBSTETRICS & GYNECOLOGY
Payer: COMMERCIAL

## 2022-08-19 VITALS
SYSTOLIC BLOOD PRESSURE: 105 MMHG | HEART RATE: 83 BPM | BODY MASS INDEX: 29.64 KG/M2 | WEIGHT: 167.3 LBS | HEIGHT: 63 IN | DIASTOLIC BLOOD PRESSURE: 72 MMHG

## 2022-08-19 DIAGNOSIS — N95.2 VAGINAL ATROPHY: ICD-10-CM

## 2022-08-19 DIAGNOSIS — L90.0 LICHEN SCLEROSUS: Primary | ICD-10-CM

## 2022-08-19 PROCEDURE — 99213 OFFICE O/P EST LOW 20 MIN: CPT | Mod: GC | Performed by: OBSTETRICS & GYNECOLOGY

## 2022-08-19 PROCEDURE — G0463 HOSPITAL OUTPT CLINIC VISIT: HCPCS

## 2022-08-19 NOTE — PROGRESS NOTES
"[facility]  Follow-Up Visit    S: Ms. Cinthya Waite is a 66 year old  here for follow up of her lichen sclerosis. She was last seen in 2019. She reports that she has generally felt asymptomatic from her lichen sclerosis. She has a few flares a year, at which time she will use the clobetasol for 2-3 days. She does not use it regularly otherwise. She also notes some vaginal dryness that is most noticeable when she is riding her bike. She states that it is not bothersome enough to consider vaginal estrogen, and she is hesitant to use the vaginal estrogen given her history of breast cancer. She is not sexually active and has no plans to become sexually active in the next few years.    O:  /72 (BP Location: Right arm, Patient Position: Chair)   Pulse 83   Ht 1.6 m (5' 3\")   Wt 75.9 kg (167 lb 4.8 oz)   LMP 2007   BMI 29.64 kg/m    Gen: Well-appearing, NAD  HEENT: Normocephalic, atraumatic  CV:        Well perfused; no LE edema  Pulm:  Normal respiratory effort and rate  Abd: Soft, non-tender, non-distended    Pelvic:  Agglutination of bilateral labia minora. Adhesion of clitoral valencia. Urethra not visible on visual examination. Flat, non-tender red macules with no ulceration. Thin, white, papery plaque on left labia, ~1.5cm in size. Normal hair distribution. Narrow introitus, patent to index finger. Adhesion within vagina but able to pass index finger through adhesions, no blood on glove following this procedure. Did not use speculum. No discharge.    A/P:  Ms. Cinthya Waite is a 66 year old  here for follow up of lichen sclerosis.    Lichen Sclerosis  - Some worsening of lichen sclerosis. Exam with plaque on labia that was not present at last exam in 2019.   - Encouraged patient to use clobetasol around introitus twice weekly  - Will follow up in 4-6 weeks to evaluate any changes with more frequent clobetasol use and for biopsy of white plaque. Pt given topical " lidocaine to apply prior to visit.    Vaginal atrophy with adhesions  - Stable on exam today  - Overall asymptomatic  - Discussed vaginal estrogen, but pt would like to avoid given history of breast cancer and desire to avoid estrogen exposure. Will consider if becomes symptomatic.    Return to clinic in 4-6 weeks for follow up and biopsy    Rogelio Quezada MD  OB/GYN PGY-1  08/19/2022 9:07 AM    Appreciate Dr. Quezada's note above, patient also seen and examined by me. I agree with the note above.   Cyndee Caballero MD

## 2022-08-19 NOTE — LETTER
"2022       RE: Cinthya Waite  [address]  [facility] [address]     Dear Colleague,    Thank you for referring your patient, Cinthya Waite, to the [facility] [place] at [facility]. Please see a copy of my visit note below.    [facility]  Follow-Up Visit    S: Ms. Cinthya Waite is a 66 year old  here for follow up of her lichen sclerosis. She was last seen in 2019. She reports that she has generally felt asymptomatic from her lichen sclerosis. She has a few flares a year, at which time she will use the clobetasol for 2-3 days. She does not use it regularly otherwise. She also notes some vaginal dryness that is most noticeable when she is riding her bike. She states that it is not bothersome enough to consider vaginal estrogen, and she is hesitant to use the vaginal estrogen given her history of breast cancer. She is not sexually active and has no plans to become sexually active in the next few years.    O:  /72 (BP Location: Right arm, Patient Position: Chair)   Pulse 83   Ht 1.6 m (5' 3\")   Wt 75.9 kg (167 lb 4.8 oz)   LMP 2007   BMI 29.64 kg/m    Gen: Well-appearing, NAD  HEENT: Normocephalic, atraumatic  CV:        Well perfused; no LE edema  Pulm:  Normal respiratory effort and rate  Abd: Soft, non-tender, non-distended    Pelvic:  Agglutination of bilateral labia minora. Adhesion of clitoral valencia. Urethra not visible on visual examination. Flat, non-tender red macules with no ulceration. Thin, white, papery plaque on left labia, ~1.5cm in size. Normal hair distribution. Narrow introitus, patent to index finger. Adhesion within vagina but able to pass index finger through adhesions, no blood on glove following this procedure. Did not use speculum. No discharge.    A/P:  Ms. Cinthya Waite is a 66 year old  here for follow up of lichen " sclerosis.    Lichen Sclerosis  - Some worsening of lichen sclerosis. Exam with plaque on labia that was not present at last exam in 2019.   - Encouraged patient to use clobetasol around introitus twice weekly  - Will follow up in 4-6 weeks to evaluate any changes with more frequent clobetasol use and for biopsy of white plaque. Pt given topical lidocaine to apply prior to visit.    Vaginal atrophy with adhesions  - Stable on exam today  - Overall asymptomatic  - Discussed vaginal estrogen, but pt would like to avoid given history of breast cancer and desire to avoid estrogen exposure. Will consider if becomes symptomatic.    Return to clinic in 4-6 weeks for follow up and biopsy    Rogelio Quezada MD  OB/GYN PGY-1  08/19/2022 9:07 AM    Appreciate Dr. Quezada's note above, patient also seen and examined by me. I agree with the note above.   Cyndee Caballero MD

## 2022-08-23 ENCOUNTER — TRANSFERRED RECORDS (OUTPATIENT)
Dept: HEALTH INFORMATION MANAGEMENT | Facility: CLINIC | Age: 67
End: 2022-08-23

## 2022-08-24 ENCOUNTER — ANCILLARY PROCEDURE (OUTPATIENT)
Dept: MAMMOGRAPHY | Facility: CLINIC | Age: 67
End: 2022-08-24
Payer: COMMERCIAL

## 2022-08-24 DIAGNOSIS — Z85.3 HX OF BREAST CANCER: ICD-10-CM

## 2022-08-24 DIAGNOSIS — Z12.31 ENCOUNTER FOR SCREENING MAMMOGRAM FOR MALIGNANT NEOPLASM OF BREAST: ICD-10-CM

## 2022-08-24 PROCEDURE — 77063 BREAST TOMOSYNTHESIS BI: CPT | Mod: GC | Performed by: RADIOLOGY

## 2022-08-24 PROCEDURE — 77067 SCR MAMMO BI INCL CAD: CPT | Mod: GC | Performed by: RADIOLOGY

## 2022-09-26 DIAGNOSIS — F34.1 DYSTHYMIC DISORDER: ICD-10-CM

## 2022-09-26 RX ORDER — BUSPIRONE HYDROCHLORIDE 15 MG/1
TABLET ORAL
Qty: 180 TABLET | Refills: 0 | Status: SHIPPED | OUTPATIENT
Start: 2022-09-26

## 2022-09-26 NOTE — TELEPHONE ENCOUNTER
"Routing refill request to provider for review/approval because:  phq 9    Last Written Prescription Date:  7/6/22  Last Fill Quantity: 180,  # refills: 0   Last office visit provider:  5/31/22     Requested Prescriptions   Pending Prescriptions Disp Refills     busPIRone (BUSPAR) 15 MG tablet [Pharmacy Med Name: BUSPIRONE HCL 15 MG TABLET] 180 tablet 0     Sig: TAKE 1 TABLET BY MOUTH TWICE A DAY       Atypical Antidepressants Protocol Failed - 9/26/2022  9:44 AM        Failed - Patient has PHQ-9 score less than 5 in past 6 months.     Please review last PHQ-9 score.           Passed - Medication active on med list        Passed - Patient is age 18 or older        Passed - No active pregnancy on record        Passed - No positive pregnancy test in past 12 mos        Passed - Recent (6 mo) or future (30 days) visit within the authorizing provider's specialty     Patient had office visit in the last 6 months or has a visit in the next 30 days with authorizing provider or within the authorizing provider's specialty.  See \"Patient Info\" tab in inbasket, or \"Choose Columns\" in Meds & Orders section of the refill encounter.                 Lona Bacon RN 09/26/22 5:24 PM  "

## 2022-10-07 ENCOUNTER — OFFICE VISIT (OUTPATIENT)
Dept: URGENT CARE | Facility: URGENT CARE | Age: 67
End: 2022-10-07
Payer: COMMERCIAL

## 2022-10-07 VITALS — DIASTOLIC BLOOD PRESSURE: 79 MMHG | OXYGEN SATURATION: 100 % | TEMPERATURE: 99.1 F | SYSTOLIC BLOOD PRESSURE: 127 MMHG

## 2022-10-07 DIAGNOSIS — J22 LOWER RESPIRATORY TRACT INFECTION: ICD-10-CM

## 2022-10-07 DIAGNOSIS — R05.1 ACUTE COUGH: Primary | ICD-10-CM

## 2022-10-07 PROCEDURE — 99214 OFFICE O/P EST MOD 30 MIN: CPT | Performed by: FAMILY MEDICINE

## 2022-10-07 RX ORDER — BENZONATATE 200 MG/1
200 CAPSULE ORAL 3 TIMES DAILY PRN
Qty: 30 CAPSULE | Refills: 0 | Status: SHIPPED | OUTPATIENT
Start: 2022-10-07 | End: 2022-11-03

## 2022-10-07 RX ORDER — CODEINE PHOSPHATE AND GUAIFENESIN 10; 100 MG/5ML; MG/5ML
2 SOLUTION ORAL EVERY 6 HOURS PRN
Qty: 118 ML | Refills: 0 | Status: SHIPPED | OUTPATIENT
Start: 2022-10-07 | End: 2022-11-03

## 2022-10-07 RX ORDER — ALBUTEROL SULFATE 90 UG/1
2 AEROSOL, METERED RESPIRATORY (INHALATION) EVERY 6 HOURS PRN
Qty: 18 G | Refills: 0 | Status: SHIPPED | OUTPATIENT
Start: 2022-10-07

## 2022-10-07 NOTE — PROGRESS NOTES
SUBJECTIVE:   Cinthya Waite is a 66 year old female presenting with a chief complaint of cough, congestion, sore throat, chest hurts from coughing.  No fever  Onset of symptoms was 1 week(s) ago.  Course of illness is worsening.    Severity moderate  Current and Associated symptoms: cough, chest pain.  Sinus pressure temple area  Treatment measures tried include OTC Cough med and tylenol.  Predisposing factors include HTN, CAD, depression, hypothyroid.    Completed COVID vaccinations, boosted  Home COVID rapid test negative, last one was yesterday    No close positive COVID exposure    Past Medical History:   Diagnosis Date     Abnormal Pap smear      Breast cancer (H) 7/2007    T1N0 left breast cancer     Depressive disorder, not elsewhere classified     Buspar, wellbutrin zoloft  sees Dr. Lim      Lichen sclerosus et atrophicus      Pure hypercholesterolemia     Lipitor     Raynaud's disease      Unspecified essential hypertension      Unspecified hypothyroidism     synthroid     Current Outpatient Medications   Medication Sig Dispense Refill     aspirin 81 MG tablet Take 81 mg by mouth daily       atorvastatin (LIPITOR) 80 MG tablet Take 1 tablet (80 mg) by mouth daily 90 tablet 3     buPROPion (WELLBUTRIN XL) 150 MG 24 hr tablet TAKE 3 TABLETS BY MOUTH EVERY  tablet 2     busPIRone (BUSPAR) 15 MG tablet TAKE 1 TABLET BY MOUTH TWICE A  tablet 0     Calcium Citrate 200 MG TABS Take 1 tablet by mouth daily       Cholecalciferol 100 MCG (4000 UT) CAPS Take 4,000 Units by mouth       ezetimibe (ZETIA) 10 MG tablet Take 1 tablet (10 mg) by mouth daily 30 tablet 3     levothyroxine (SYNTHROID/LEVOTHROID) 125 MCG tablet TAKE 1 TABLET BY MOUTH EVERY OTHER DAY, ALTERNATIVE WITH 150MCG 45 tablet 2     levothyroxine (SYNTHROID/LEVOTHROID) 150 MCG tablet TAKE 1 TABLET BY MOUTH EVERY OTHER DAY AND ALTERNATE WITH 125 DOSE 45 tablet 2     lisinopril (ZESTRIL) 2.5 MG tablet Take 2.5 mg by mouth  daily       traZODone (DESYREL) 50 MG tablet Take 1 tablet (50 mg) by mouth At Bedtime 90 tablet 1     amLODIPine (NORVASC) 5 MG tablet Take 1 tablet (5 mg) by mouth daily (Patient not taking: No sig reported) 90 tablet 3     clobetasol (TEMOVATE) 0.05 % external ointment Apply in thin layer to vulva as instructed, twice weekly.  Do not apply to face. (Patient not taking: No sig reported) 45 g 3     famotidine (PEPCID) 40 MG tablet Take 1 tablet (40 mg) by mouth nightly as needed for heartburn (Patient not taking: No sig reported) 30 tablet 3     hydrocortisone 2.5 % cream PLEASE SEE ATTACHED FOR DETAILED DIRECTIONS (Patient not taking: No sig reported)       ketoconazole (NIZORAL) 2 % external cream PLEASE SEE ATTACHED FOR DETAILED DIRECTIONS (Patient not taking: No sig reported)       Magnesium Carbonate 250 MG/GM POWD Take 250 mg by mouth (Patient not taking: No sig reported)       Social History     Tobacco Use     Smoking status: Former Smoker     Types: Cigarettes     Start date: 1971     Quit date: 1975     Years since quittin.4     Smokeless tobacco: Never Used     Tobacco comment: Smoked in  for 1 year   Substance Use Topics     Alcohol use: Not on file     Comment: occ       ROS:  Review of systems negative except as stated above.    OBJECTIVE:  /79   Temp 99.1  F (37.3  C) (Tympanic)   LMP 2007   SpO2 100%   GENERAL APPEARANCE: healthy, alert and no distress  EYES: EOMI,  PERRL, conjunctiva clear  HENT: ear canals normal.  Bilateral TM - faint pink.  Nose and mouth without ulcers, erythema or lesions  RESP: lungs coarse with decrease BS on right, no crackles or wheezes  CV: regular rates and rhythm, normal S1 S2, no murmur noted  PSYCH: mentation appears normal, affect normal/bright and fatigued    CXR - no acute infiltrate, no pleural effusion, no pneumothorax, hiatal hernia personally viewed by me    ASSESSMENT/PLAN:  (R05.1) Acute cough  (primary encounter  diagnosis)  Plan: XR Chest 2 Views, benzonatate (TESSALON) 200 MG        capsule, guaiFENesin-codeine (ROBITUSSIN AC)         100-10 MG/5ML solution            (J22) Lower respiratory tract infection  Plan: benzonatate (TESSALON) 200 MG capsule,         guaiFENesin-codeine (ROBITUSSIN AC) 100-10         MG/5ML solution, albuterol (PROAIR         HFA/PROVENTIL HFA/VENTOLIN HFA) 108 (90 Base)         MCG/ACT inhaler, amoxicillin-clavulanate         (AUGMENTIN) 875-125 MG tablet            Reassurance given, reviewed symptomatic treatment with tylenol, plenty of fluids and rest.  RX tessalon perles and RX man AC given to help with cough, RX albuterol inhaler given to help with cough and bronchospasm symptoms.  Due to co-morbid medical health history, age and worsening symptoms, RX Augmentin given for treatment for lower respiratory tract infection and reviewed that this will also cross cover for sinus infection.  Will follow up on formal Xray report and notify if any abnormalities    Follow up with primary provider if no improvement of symptoms in 1-2 weeks    Jared Rodríguez MD  October 7, 2022 11:59 AM

## 2022-11-03 ENCOUNTER — OFFICE VISIT (OUTPATIENT)
Dept: OBGYN | Facility: CLINIC | Age: 67
End: 2022-11-03
Attending: OBSTETRICS & GYNECOLOGY
Payer: COMMERCIAL

## 2022-11-03 VITALS
SYSTOLIC BLOOD PRESSURE: 121 MMHG | WEIGHT: 169 LBS | BODY MASS INDEX: 29.94 KG/M2 | DIASTOLIC BLOOD PRESSURE: 77 MMHG | HEART RATE: 82 BPM

## 2022-11-03 DIAGNOSIS — L90.0 LICHEN SCLEROSUS: Primary | ICD-10-CM

## 2022-11-03 PROCEDURE — 88305 TISSUE EXAM BY PATHOLOGIST: CPT | Mod: 26 | Performed by: PATHOLOGY

## 2022-11-03 PROCEDURE — G0463 HOSPITAL OUTPT CLINIC VISIT: HCPCS

## 2022-11-03 PROCEDURE — 56605 BIOPSY OF VULVA/PERINEUM: CPT | Performed by: OBSTETRICS & GYNECOLOGY

## 2022-11-03 PROCEDURE — 88305 TISSUE EXAM BY PATHOLOGIST: CPT | Mod: TC | Performed by: OBSTETRICS & GYNECOLOGY

## 2022-11-03 ASSESSMENT — PAIN SCALES - GENERAL: PAINLEVEL: NO PAIN (0)

## 2022-11-03 NOTE — PROGRESS NOTES
"  SUBJECTIVE: Cinthya Waite,  66 year old ,  female returns for vulvar biopsy. Has history of lichen sclerosis and has been using clobetasol more regularly.  Plan for biopsy today given new plaque on last exam.  She thinks this has improved with more regular use of clobetasol.    Menstrual History:  Menstrual History 2006   LAST MENSTRUAL PERIOD 2006       OBJECTIVE: /77   Pulse 82   Wt 76.7 kg (169 lb)   LMP 2007   BMI 29.94 kg/m      EG/BUS: Estrogen loss atrophy present. Agglutination of bilateral labia minora. Adhesion of clitoral valencia. Urethra not visible on visual examination.  Thin, white, papery plaque on left labia, <1 cm in size. Normal hair distribution.Bartholin's, Urethra, [place]'s normal     ASSESSMENT:   Lichen Sclerosis     PLAN:     Vulvar Punch Biopsy Procedure:    Time Out - \"Pause for the Cause\"  Just before the procedure begins, through verbal and active participation of team members, verify:   Initials   Patient Name mip   Patient  mip   Procedure to be performed mip     Preoperative Diagnosis: Lichen sclerosis    Postoperative Diagnosis:  same    Consent:   Risks, benefits of treatment, and alternate forms of evaluation were discussed.  Patient's questions were elicited and answered.Verbal consent obtained. Written consent signed and scanned    Skin Preparation:  Betadine    Anesthesia:1% lidocaine      A 3 mm punch biopsy obtained at left labium majus, just lateral to the introitus.  Specimens were placed in labeled Formalin Jar.    EBL:  2ml  Stasis achieved with silver nitrate and pressure. Complications:  None    Specimen sent to Pathology.  Will call with pathology results and recommended follow-up.     Tolerance of Procedure: Patient did tolerate the procedure well.     Follow Up: Rinse with cool water after voiding and defecation. Keep wound dry for 24 hours then may shower. Take OTC pain medications as " needed.     Written instructions on wound care and vulvodynia were reviewed and given. Call if redness, swelling, severe pain or any other concerns.     Verbalized understanding and agreement with visit plan.    Cyndee Caballero MD

## 2022-11-03 NOTE — LETTER
"11/3/2022       RE: Cinthya Waite  [address]  [facility] [place] [address]     Dear Colleague,    Thank you for referring your patient, Cinthya Waite, to the [facility] [place] at [facility]. Please see a copy of my visit note below.    Chief Complaint   Patient presents with     Minor Procedure     Vulvar BX   Carline Parks LPN      SUBJECTIVE: Cinthya Waite,  66 year old ,  female returns for vulvar biopsy. Has history of lichen sclerosis and has been using clobetasol more regularly.  Plan for biopsy today given new plaque on last exam.  She thinks this has improved with more regular use of clobetasol.    Menstrual History:  Menstrual History 2006   LAST MENSTRUAL PERIOD 2006       OBJECTIVE: /77   Pulse 82   Wt 76.7 kg (169 lb)   LMP 2007   BMI 29.94 kg/m      EG/BUS: Estrogen loss atrophy present. Agglutination of bilateral labia minora. Adhesion of clitoral valencia. Urethra not visible on visual examination.  Thin, white, papery plaque on left labia, <1 cm in size. Normal hair distribution.Bartholin's, Urethra, [place]'s normal     ASSESSMENT:   Lichen Sclerosis     PLAN:     Vulvar Punch Biopsy Procedure:    Time Out - \"Pause for the Cause\"  Just before the procedure begins, through verbal and active participation of team members, verify:   Initials   Patient Name mip   Patient  mip   Procedure to be performed mip     Preoperative Diagnosis: Lichen sclerosis    Postoperative Diagnosis:  same    Consent:   Risks, benefits of treatment, and alternate forms of evaluation were discussed.  Patient's questions were elicited and answered.Verbal consent obtained. Written consent signed and scanned    Skin Preparation:  Betadine    Anesthesia:1% lidocaine      A 3 mm punch biopsy obtained at left labium majus, just lateral to the introitus. "  Specimens were placed in labeled Formalin Jar.    EBL:  2ml  Stasis achieved with silver nitrate and pressure. Complications:  None    Specimen sent to Pathology.  Will call with pathology results and recommended follow-up.     Tolerance of Procedure: Patient did tolerate the procedure well.     Follow Up: Rinse with cool water after voiding and defecation. Keep wound dry for 24 hours then may shower. Take OTC pain medications as needed.     Written instructions on wound care and vulvodynia were reviewed and given. Call if redness, swelling, severe pain or any other concerns.     Verbalized understanding and agreement with visit plan.    Cyndee Caballero MD

## 2022-11-04 DIAGNOSIS — F34.1 DYSTHYMIC DISORDER: ICD-10-CM

## 2022-11-06 RX ORDER — BUPROPION HYDROCHLORIDE 150 MG/1
TABLET ORAL
Qty: 270 TABLET | Refills: 2 | Status: SHIPPED | OUTPATIENT
Start: 2022-11-06

## 2022-11-06 NOTE — TELEPHONE ENCOUNTER
"Routing refill request to provider for review/approval because:  phq 9    Last Written Prescription Date:  12/15/21  Last Fill Quantity: 270,  # refills: 2   Last office visit provider:  5/31//22     Requested Prescriptions   Pending Prescriptions Disp Refills     buPROPion (WELLBUTRIN XL) 150 MG 24 hr tablet [Pharmacy Med Name: BUPROPION HCL  MG TABLET] 270 tablet 2     Sig: TAKE 3 TABLETS BY MOUTH EVERY DAY       SSRIs Protocol Failed - 11/4/2022  4:07 PM        Failed - PHQ-9 score less than 5 in past 6 months     Please review last PHQ-9 score.           Passed - Medication is Bupropion     If the medication is Bupropion (Wellbutrin), and the patient is taking for smoking cessation; OK to refill.          Passed - Medication is active on med list        Passed - Patient is age 18 or older        Passed - No active pregnancy on record        Passed - No positive pregnancy test in last 12 months        Passed - Recent (6 mo) or future (30 days) visit within the authorizing provider's specialty     Patient had office visit in the last 6 months or has a visit in the next 30 days with authorizing provider or within the authorizing provider's specialty.  See \"Patient Info\" tab in inbasket, or \"Choose Columns\" in Meds & Orders section of the refill encounter.                 Lona Bacon RN 11/06/22 12:18 PM  "

## 2022-11-09 LAB
PATH REPORT.COMMENTS IMP SPEC: NORMAL
PATH REPORT.FINAL DX SPEC: NORMAL
PATH REPORT.GROSS SPEC: NORMAL
PATH REPORT.MICROSCOPIC SPEC OTHER STN: NORMAL
PATH REPORT.RELEVANT HX SPEC: NORMAL
PHOTO IMAGE: NORMAL

## 2022-11-10 ENCOUNTER — TELEPHONE (OUTPATIENT)
Dept: INTERNAL MEDICINE | Facility: CLINIC | Age: 67
End: 2022-11-10

## 2022-11-10 DIAGNOSIS — F41.9 ANXIETY: Primary | ICD-10-CM

## 2022-11-10 RX ORDER — LORAZEPAM 0.5 MG/1
TABLET ORAL
Qty: 2 TABLET | Refills: 0 | Status: SHIPPED | OUTPATIENT
Start: 2022-11-10 | End: 2022-12-02

## 2022-11-10 NOTE — TELEPHONE ENCOUNTER
New Medication Request        What medication are you requesting?: Anxiety medication     Reason for medication request: Having MRI done tomorrow at noon and want to see if anxiety medication can be prescribe. Can covering provider prescribe this?    Have you taken this medication before?: N/A    Controlled Substance Agreement on file:   CSA -- Patient Level:    CSA: None found at the patient level.         Patient offered an appointment? No    Preferred Pharmacy:   [id number] [employer] - [place], [place] - [address] [place] [address]  Phone: [phone] Fax: [phone]      Could we send this information to you in [employer] or would you prefer to receive a phone call?:   Patient would prefer a phone call   Okay to leave a detailed message?: Yes at Cell number on file:    Telephone Information:   Mobile [phone]

## 2022-11-10 NOTE — TELEPHONE ENCOUNTER
Spoke with patient and relayed prescription information. Patient stated that her  is going to be driving her to and from the procedure tomorrow, she will not be driving.

## 2022-11-10 NOTE — TELEPHONE ENCOUNTER
Please let patient know, I sent prescription for lorazepam to her pharmacy.  She should bring it with her to the imaging center and take it half an hour before her procedure.  Because of the sedative somebody should drive her back, she should not drive herself.

## 2022-11-11 ENCOUNTER — NURSE TRIAGE (OUTPATIENT)
Dept: NURSING | Facility: CLINIC | Age: 67
End: 2022-11-11

## 2022-11-11 NOTE — TELEPHONE ENCOUNTER
Pt reports she has been seeing an [facility] orthopedics provider after a fall and was told she has a clavicle and pelvic fractures. Pt is wondering what restrictions on activity she should have.    Advised pt to contact her ortho provider now.    Pt verbalizes understanding and agrees to plan.     Reason for Disposition    Caller has URGENT question and triager unable to answer question    Protocols used: RECENT MEDICAL VISIT FOR INJURY FOLLOW-UP CALL-A-OH

## 2022-11-24 DIAGNOSIS — F51.01 PRIMARY INSOMNIA: ICD-10-CM

## 2022-11-25 RX ORDER — TRAZODONE HYDROCHLORIDE 50 MG/1
50 TABLET, FILM COATED ORAL AT BEDTIME
Qty: 90 TABLET | Refills: 2 | Status: SHIPPED | OUTPATIENT
Start: 2022-11-25

## 2022-11-25 NOTE — TELEPHONE ENCOUNTER
"Last Written Prescription Date:  5/31/22  Last Fill Quantity: 90,  # refills: 1   Last office visit provider:  5/31/22     Requested Prescriptions   Pending Prescriptions Disp Refills     traZODone (DESYREL) 50 MG tablet [Pharmacy Med Name: TRAZODONE 50 MG TABLET] 90 tablet 1     Sig: TAKE 1 TABLET BY MOUTH AT BEDTIME       Serotonin Modulators Passed - 11/25/2022  7:43 AM        Passed - Recent (12 mo) or future (30 days) visit within the authorizing provider's specialty     Patient has had an office visit with the authorizing provider or a provider within the authorizing providers department within the previous 12 mos or has a future within next 30 days. See \"Patient Info\" tab in inbasket, or \"Choose Columns\" in Meds & Orders section of the refill encounter.              Passed - Medication is active on med list        Passed - Patient is age 18 or older        Passed - No active pregnancy on record        Passed - No positive pregnancy test in past 12 months             Pedrito Garcia RN 11/25/22 7:43 AM  "

## 2022-12-02 ENCOUNTER — OFFICE VISIT (OUTPATIENT)
Dept: INTERNAL MEDICINE | Facility: CLINIC | Age: 67
End: 2022-12-02
Payer: COMMERCIAL

## 2022-12-02 VITALS
TEMPERATURE: 98.1 F | DIASTOLIC BLOOD PRESSURE: 77 MMHG | SYSTOLIC BLOOD PRESSURE: 117 MMHG | HEART RATE: 82 BPM | BODY MASS INDEX: 29.23 KG/M2 | WEIGHT: 165 LBS | HEIGHT: 63 IN | OXYGEN SATURATION: 98 %

## 2022-12-02 DIAGNOSIS — I25.10 CORONARY ARTERY CALCIFICATION SEEN ON CAT SCAN: ICD-10-CM

## 2022-12-02 DIAGNOSIS — Z85.3 PERSONAL HISTORY OF MALIGNANT NEOPLASM OF BREAST: ICD-10-CM

## 2022-12-02 DIAGNOSIS — K44.9 HIATAL HERNIA: ICD-10-CM

## 2022-12-02 DIAGNOSIS — I10 ESSENTIAL (PRIMARY) HYPERTENSION: Primary | ICD-10-CM
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
## 2022-12-02 DIAGNOSIS — M81.0 AGE-RELATED OSTEOPOROSIS WITHOUT CURRENT PATHOLOGICAL FRACTURE: ICD-10-CM

## 2022-12-02 DIAGNOSIS — E03.9 ACQUIRED HYPOTHYROIDISM: ICD-10-CM

## 2022-12-02 DIAGNOSIS — T14.8XXA FRACTURE OF BONE DUE TO ACCIDENTAL FALL: ICD-10-CM

## 2022-12-02 LAB
ALBUMIN SERPL BCG-MCNC: 4.4 G/DL (ref 3.5–5.2)
ALP SERPL-CCNC: 154 U/L (ref 35–104)
ALT SERPL W P-5'-P-CCNC: 15 U/L (ref 10–35)
AST SERPL W P-5'-P-CCNC: 23 U/L (ref 10–35)
BILIRUB DIRECT SERPL-MCNC: <0.2 MG/DL (ref 0–0.3)
BILIRUB SERPL-MCNC: 0.5 MG/DL
CHOLEST SERPL-MCNC: 206 MG/DL
ERYTHROCYTE [DISTWIDTH] IN BLOOD BY AUTOMATED COUNT: 12.4 % (ref 10–15)
HCT VFR BLD AUTO: 47.4 % (ref 35–47)
HDLC SERPL-MCNC: 62 MG/DL
HGB BLD-MCNC: 15.5 G/DL (ref 11.7–15.7)
LDLC SERPL CALC-MCNC: 113 MG/DL
MCH RBC QN AUTO: 30.5 PG (ref 26.5–33)
MCHC RBC AUTO-ENTMCNC: 32.7 G/DL (ref 31.5–36.5)
MCV RBC AUTO: 93 FL (ref 78–100)
NONHDLC SERPL-MCNC: 144 MG/DL
PLATELET # BLD AUTO: 267 10E3/UL (ref 150–450)
PROT SERPL-MCNC: 7.4 G/DL (ref 6.4–8.3)
RBC # BLD AUTO: 5.08 10E6/UL (ref 3.8–5.2)
TRIGL SERPL-MCNC: 156 MG/DL
TSH SERPL DL<=0.005 MIU/L-ACNC: 2.65 UIU/ML (ref 0.3–4.2)
WBC # BLD AUTO: 7.5 10E3/UL (ref 4–11)

## 2022-12-02 PROCEDURE — 80061 LIPID PANEL: CPT | Performed by: INTERNAL MEDICINE

## 2022-12-02 PROCEDURE — 82306 VITAMIN D 25 HYDROXY: CPT | Performed by: INTERNAL MEDICINE

## 2022-12-02 PROCEDURE — 99214 OFFICE O/P EST MOD 30 MIN: CPT | Performed by: INTERNAL MEDICINE

## 2022-12-02 PROCEDURE — 85027 COMPLETE CBC AUTOMATED: CPT | Performed by: INTERNAL MEDICINE

## 2022-12-02 PROCEDURE — 80076 HEPATIC FUNCTION PANEL: CPT | Performed by: INTERNAL MEDICINE

## 2022-12-02 PROCEDURE — 36415 COLL VENOUS BLD VENIPUNCTURE: CPT | Performed by: INTERNAL MEDICINE

## 2022-12-02 PROCEDURE — 84443 ASSAY THYROID STIM HORMONE: CPT | Performed by: INTERNAL MEDICINE

## 2022-12-02 RX ORDER — LISINOPRIL 2.5 MG/1
2.5 TABLET ORAL DAILY
Qty: 90 TABLET | Refills: 1 | Status: SHIPPED | OUTPATIENT
Start: 2022-12-02

## 2022-12-02 RX ORDER — ATORVASTATIN CALCIUM 80 MG/1
80 TABLET, FILM COATED ORAL DAILY
Qty: 90 TABLET | Refills: 3 | Status: SHIPPED | OUTPATIENT
Start: 2022-12-02

## 2022-12-02 RX ORDER — KETOCONAZOLE 20 MG/G
CREAM TOPICAL
COMMUNITY
Start: 2022-11-04

## 2022-12-02 ASSESSMENT — PATIENT HEALTH QUESTIONNAIRE - PHQ9
SUM OF ALL RESPONSES TO PHQ QUESTIONS 1-9: 7
10. IF YOU CHECKED OFF ANY PROBLEMS, HOW DIFFICULT HAVE THESE PROBLEMS MADE IT FOR YOU TO DO YOUR WORK, TAKE CARE OF THINGS AT HOME, OR GET ALONG WITH OTHER PEOPLE: SOMEWHAT DIFFICULT
SUM OF ALL RESPONSES TO PHQ QUESTIONS 1-9: 7

## 2022-12-02 ASSESSMENT — PAIN SCALES - GENERAL: PAINLEVEL: MODERATE PAIN (4)

## 2022-12-02 NOTE — PROGRESS NOTES
"  Assessment & Plan     Essential (primary) hypertension  Stable on current medications.  Will renew for 1 year.    - atorvastatin (LIPITOR) 80 MG tablet  Dispense: 90 tablet; Refill: 3  - lisinopril (ZESTRIL) 2.5 MG tablet  Dispense: 90 tablet; Refill: 1  - Lipid panel reflex to direct LDL Fasting  - Hepatic function panel  - CBC with platelets  - Lipid panel reflex to direct LDL Fasting  - Hepatic function panel  - CBC with platelets    Coronary artery calcification seen on CAT scan-score equaled 49  On atorvastatin-80 mg-due for update on labs goal is to optimize risk factors by lowering LDL.  To less than 100.    We will update labs    Acquired hypothyroidism  Due for an update on labs  - TSH with free T4 reflex  - TSH with free T4 reflex    Age-related osteoporosis without current pathological fracture  Remains on Prolia-of note, she has had a traumatic injury with fracture while on Prolia.  Of note, this was NOT a fragility fracture.  She was walking her dog on wet leaves.  He went after squirrel and she felt heavily on cement.  Clavicle and nondisplaced/closed pelvic fractures.  Of note, her bone density had improved to low bone mass with Prolia.  We were contemplating a transition.  However, with recent fractures, I think she should stay the course.  Of note, improvements from Prolia are transient.  All Prolia needs to be followed up with an antiresorptive drug like a bisphosphonate.  Bone mineral density due in April.    - Vitamin D Deficiency  - Vitamin D Deficiency    Fracture of bone due to accidental fall  As above-following with [facility] orthopedic surgery.    Hiatal hernia  Noted/Discussed antireflux measures/head of bed elevation/small frequent meals/etc.    Personal history of malignant neoplasm of breast  Stable        BMI:   Estimated body mass index is 29.23 kg/m  as calculated from the following:    Height as of this encounter: 1.6 m (5' 3\").    Weight as of this encounter: 74.8 kg (165 lb). "         No follow-ups on file.    Heidi Valencia MD  [facility] TATYANA Mendes is a 67-year-old female presenting for the following health issues:  Fall (Follow up bone care. Unsure of her BP medications?)    She is here for follow-up of her usual medical problems and to discuss her recent fall.    Of note, she has a history of osteoporosis and had been making excellent progress on Prolia with improvement of bone mineral densities such that she has been in the low bone mass range.  She is due for a bone density in April 2023 and this test is ordered.  She reports today that she had a significant fall.  She was walking her dog on wet leaves.  A squirrel appeared and the dog took off pulling her down onto the cement quite aggressively.  She has multiple small closed pelvic fractures and fractured clavicle.  She has been followed by orthopedic surgery for this and is healing well.  She is currently supplementing with calcium.  Her vitamin D levels have been ideal.  She seems to be healing unremarkably.  She will be starting physical therapy soon.      Additionally, she is here to discuss her other medical problems and health maintenance.  Her chart is reviewed.  Her blood pressure is stable on current medications and renewals will be provided.  She has known coronary artery calcifications with an seep score of 49.  Our goals with this have been to minimize coronary artery risk factors by keeping LDL cholesterol is well under 100.  She is on a statin medication and due for an update on labs.    She has no other complaints.       Current Outpatient Medications   Medication     aspirin 81 MG tablet     atorvastatin (LIPITOR) 80 MG tablet     buPROPion (WELLBUTRIN XL) 150 MG 24 hr tablet     busPIRone (BUSPAR) 15 MG tablet     Calcium Citrate 200 MG TABS     Cholecalciferol 100 MCG (4000 UT) CAPS     ketoconazole (NIZORAL) 2 % external cream     levothyroxine (SYNTHROID/LEVOTHROID)  "125 MCG tablet     levothyroxine (SYNTHROID/LEVOTHROID) 150 MCG tablet     lisinopril (ZESTRIL) 2.5 MG tablet     albuterol (PROAIR HFA/PROVENTIL HFA/VENTOLIN HFA) 108 (90 Base) MCG/ACT inhaler     traZODone (DESYREL) 50 MG tablet     Current Facility-Administered Medications   Medication     denosumab (PROLIA) injection 60 mg           Objective    /77 (BP Location: Right arm, Patient Position: Sitting, Cuff Size: Adult Regular)   Pulse 82   Temp 98.1  F (36.7  C)   Ht 1.6 m (5' 3\")   Wt 74.8 kg (165 lb)   LMP 06/07/2007   SpO2 98%   BMI 29.23 kg/m    Body mass index is 29.23 kg/m .  Physical Exam                 Answers for HPI/ROS submitted by the patient on 12/2/2022  If you checked off any problems, how difficult have these problems made it for you to do your work, take care of things at home, or get along with other people?: Somewhat difficult  PHQ9 TOTAL SCORE: 7  How many servings of fruits and vegetables do you eat daily?: 4 or more  On average, how many sweetened beverages do you drink each day (Examples: soda, juice, sweet tea, etc.  Do NOT count diet or artificially sweetened beverages)?: 0  How many minutes a day do you exercise enough to make your heart beat faster?: 20 to 29  How many days per week do you miss taking your medication?: 0  What is the reason for your visit today?: follow clean fractures  When did your symptoms begin?: 3-4 weeks ago      "

## 2022-12-03 LAB — DEPRECATED CALCIDIOL+CALCIFEROL SERPL-MC: 86 UG/L (ref 20–75)

## 2022-12-04 ENCOUNTER — MYC MEDICAL ADVICE (OUTPATIENT)
Dept: INTERNAL MEDICINE | Facility: CLINIC | Age: 67
End: 2022-12-04

## 2022-12-05 DIAGNOSIS — I10 ESSENTIAL (PRIMARY) HYPERTENSION: ICD-10-CM

## 2022-12-05 RX ORDER — AMLODIPINE BESYLATE 5 MG/1
5 TABLET ORAL DAILY
Qty: 90 TABLET | Refills: 3 | Status: SHIPPED | OUTPATIENT
Start: 2022-12-05

## 2022-12-05 NOTE — TELEPHONE ENCOUNTER
Medication Question or Refill        What medication are you calling about (include dose and sig)?:   amLODIPine (NORVASC) 5 MG tablet (Discontinued) 90 tablet 3 7/4/2022 11/3/2022 No   Sig - Route: Take 1 tablet (5 mg) by mouth daily - Oral         Pt states it was not discontiued, looks like the nurse from her OBGYN dr vasquez.  She is needing a refill please.    Controlled Substance Agreement on file:   CSA -- Patient Level:    CSA: None found at the patient level.       Who prescribed the medication?: Dr Valencia    Do you need a refill? Yes:     When did you use the medication last? today    Patient offered an appointment? No    Do you have any questions or concerns?  No    Preferred Pharmacy:   [facility] [id number] [facility] - [place], [place] - [address] [place] [address]  Phone: [phone] Fax: [phone]      Could we send this information to you in [facility] or would you prefer to receive a phone call?:   Patient would prefer a phone call   Okay to leave a detailed message?: Yes at Cell number on file:    Telephone Information:   Mobile [phone]

## 2022-12-08 ENCOUNTER — ALLIED HEALTH/NURSE VISIT (OUTPATIENT)
Dept: FAMILY MEDICINE | Facility: CLINIC | Age: 67
End: 2022-12-08
Payer: COMMERCIAL

## 2022-12-08 DIAGNOSIS — M81.0 OSTEOPOROSIS: Primary | ICD-10-CM

## 2022-12-08 PROCEDURE — 99207 PR NO CHARGE NURSE ONLY: CPT

## 2022-12-08 PROCEDURE — 96372 THER/PROPH/DIAG INJ SC/IM: CPT | Performed by: INTERNAL MEDICINE

## 2022-12-08 NOTE — PROGRESS NOTES
Indication: Prolia  (denosumab) is a prescription medicine used to treat osteoporosis in patients who:     Are at high risk for fracture, meaning patients who have had a fracture related to osteoporosis, or who have multiple risk factors for fracture     Cannot use another osteoporosis medicine or other osteoporosis medicines did not work well   The timeline for early/late injections would be 4 weeks early and any time after the 6 month eduardo. If a patient receives their injection late, then the subsequent injection would be 6 months from the date that they actually received the injection    1.  When was the last injection?  02/03/2022  2.  Did they check with their insurance for this calendar year?  yes  3.  Is there an order in the chart?  yes  4.  Has the patient had dental work involving the bone in the past month or will have work in the next 6 months?  NO  5.  Did you have the patient wait 15 minutes after the injection?  yes  6.  Remember to use .injection under the medication notes    The following steps were completed to comply with the REMS program for Prolia:    Reviewed information in the Medication Guide and Patient Counseling Chart, including the serious risks of Prolia  and the symptoms of each risk.    Advised patient to seek prompt medical attention if they have signs or symptoms of any of the serious risks.  Provided each patient a copy of the Medication Guide and Patient Brochure.    Clinic Administered Medication Documentation      Administrations This Visit     denosumab (PROLIA) injection 60 mg     Admin Date  12/08/2022 Action  Given Dose  60 mg Route  Subcutaneous Site  Right Arm Administered By  Christine Machado RN    Ordering Provider: Heidi Valencia MD    Patient Supplied?: No                Prolia Documentation    Prior to injection, verified patient identity using patient's name and date of birth. Medication was administered. Please see MAR and medication order for additional information.  Patient instructed to remain in clinic for 15 minutes.    Indication: Prolia  (denosumab) is a prescription medicine used to treat osteoporosis in patients who:     Are at high risk for fracture, meaning patients who have had a fracture related to osteoporosis, or who have multiple risk factors for fracture.    Cannot use another osteoporosis medicine or other osteoporosis medicines did not work well.    The timeline for early/late injections would be 4 weeks early and any time after the 6 month eduardo. If a patient receives their injection late, then the subsequent injection would be 6 months from the date that they actually received the injection.    When was the last injection?  02/03/2022  Was the last injection at least 6 months ago? Yes  Has the prior authorization been completed?  Yes  Is there an active order (within the past 365 days) in the chart?  Yes  Patient denies any dental work involving the bone (e.g. tooth extraction or dental implants) in the past 4 weeks?  Yes  Patient denies plans for any dental work involving the bone (e.g. tooth extraction or dental implants) in the next 4 weeks? Yes    The following steps were completed to comply with the REMS program for Prolia:    Reviewed information in the Medication Guide and Patient Counseling Chart, including the serious risks of Prolia  and the symptoms of each risk.    Advised patient to seek prompt medical attention if they have signs or symptoms of any of the serious risks.    Provided each patient a copy of the Medication Guide and Patient Brochure.      Was entire vial of medication used? Yes  Vial/Syringe: Single dose vial  Expiration Date:  03/31/2024  Was this medication supplied by the patient? No

## 2023-01-03 ENCOUNTER — TELEPHONE (OUTPATIENT)
Dept: INTERNAL MEDICINE | Facility: CLINIC | Age: 68
End: 2023-01-03

## 2023-01-03 DIAGNOSIS — Z92.29 PERSONAL HISTORY OF OTHER DRUG THERAPY: ICD-10-CM

## 2023-01-03 DIAGNOSIS — M81.0 AGE-RELATED OSTEOPOROSIS WITHOUT CURRENT PATHOLOGICAL FRACTURE: Primary | ICD-10-CM

## 2023-01-03 NOTE — TELEPHONE ENCOUNTER
----- Message from Alise Douglas sent at 2023 11:30 PM CST -----  Regarding: CAM order expiring soon  Hello,    This patient is scheduled for prolia on 23.  Their med order  on 23. Please enter a new CAM order.     Thanks!  Alise Rubio     [facility]  HE Clinically Administered Medications

## 2023-04-07 ENCOUNTER — ANCILLARY PROCEDURE (OUTPATIENT)
Dept: BONE DENSITY | Facility: CLINIC | Age: 68
End: 2023-04-07
Attending: INTERNAL MEDICINE
Payer: COMMERCIAL

## 2023-04-07 PROCEDURE — 77080 DXA BONE DENSITY AXIAL: CPT | Mod: TC | Performed by: RADIOLOGY

## 2023-07-08 ENCOUNTER — HEALTH MAINTENANCE LETTER (OUTPATIENT)
Age: 68
End: 2023-07-08

## 2023-07-28 ENCOUNTER — TRANSFERRED RECORDS (OUTPATIENT)
Dept: HEALTH INFORMATION MANAGEMENT | Facility: CLINIC | Age: 68
End: 2023-07-28
Payer: COMMERCIAL

## 2023-09-06 ENCOUNTER — TRANSFERRED RECORDS (OUTPATIENT)
Dept: HEALTH INFORMATION MANAGEMENT | Facility: CLINIC | Age: 68
End: 2023-09-06
Payer: COMMERCIAL

## 2023-09-07 ENCOUNTER — ANCILLARY PROCEDURE (OUTPATIENT)
Dept: MAMMOGRAPHY | Facility: CLINIC | Age: 68
End: 2023-09-07
Attending: FAMILY MEDICINE
Payer: COMMERCIAL

## 2023-09-07 DIAGNOSIS — Z12.31 VISIT FOR SCREENING MAMMOGRAM: ICD-10-CM

## 2023-09-07 PROCEDURE — 77063 BREAST TOMOSYNTHESIS BI: CPT | Performed by: RADIOLOGY

## 2023-09-07 PROCEDURE — 77067 SCR MAMMO BI INCL CAD: CPT | Performed by: RADIOLOGY

## 2024-08-13 NOTE — TELEPHONE ENCOUNTER
"All weight progressions in therapy sessions are dictated by the patient tolerance and therapist discretion. Testing on MedX equipment is completed on 4-week intervals to allow for physiological change in muscle structure and neuromuscular re-education.    Lumbar MedX 4-week Re-test  7/23/24 Initial testing    AROM (full=  0-72  lumbar) 0-54 0-54   Max Extension Torque  416 401#   Flex: ext ratio (ideal 1.4:1) 1.48:1 2.95:1     Date 8/13/24 7/30/24 7/23/24 7/19/2024 7/16/24 7/5/2024 7/2/24 6/28/2024 6/25/24 6/12/2024   Lumbar Parameters             Top Dead Center (TDC) 18 18 18 18 18 18 18 18 18 18   Counterbalance (CB) 579 579 579 579 579 579 579 579 579 579   Seat Pad 0 0 0 0 0 0 0 0 0 0   Femur Restraint 6 6 6 6 6 6 6 6 6 6   Week/Visit             Enter Week/Visit # 6 5 4/2 4/1 3/2 3/1 2/2 2/1 1/2 1/1   Weight (lbs) 171# 169# 167# 166# 163# 160# 157# 155#'s 152# 150#   Reps (#) 22 19 17 19 25 20 17  17 20 15   Time 109s 9916  123 132 s 157 141 s 126 s 139 s 147   ROM (degrees) 0-54 0-54 0-54 0-54 0-54 0-54 0-54 0-54 0-54 0-54   Pain          fatigue   Therapist cues         Pace and ROM Pace, ROM     Date 8/13/24 7/30/24 7/23/24 7/19/2024 7/16/24 7/5/2024 7/2/24 6/28/2024 6/25/24 6/12/2024   Exercise             Treadmill  X 3 min 30\" X 3 min 30\" X 4 min X 5 min X 5 min X 4 min  X 4 min X 5 min  X 5 min X 5 min    Rotary torso 90 seconds 60# to L 58# to R 56# to L 54#'s to R 52# to L 50#'s to R 48# to L 46#'s to R 46# to L 40#'s started R   Piriformis stretch         X 30\" X 30 seconds   Supine HS stretch, nerve sliders         X 10/leg X 10    Prone press ups          X 10    Plank- straight arms       Encouraged to inc to 60 sec  3x 20\"    Reformer- leg press  B x 30, all  SL, x10, all   B x 30, all All springs X 30   SL X 15 all springs All x 30 All X 20 all springs All x 30    Reformer- Bridge with press      X 10 all springs  X 10 2R no press       Reformer 90/90 pulldown  X 15 3R1B   X 15 3R1B  X 12 3R 1B " Patient Returning Call  Reason for call:  Below message relayed to patient.  Information relayed to patient: Below message relayed to patient  Patient states she is experiencing symptoms, so she is going to move forward with starting the antibiotic.  Patient has additional questions:  No  If YES, what are your questions/concerns:  No additional questions at this time.  Okay to leave a detailed message?: No call back needed   "X 10 3R 1B     Reformer hamstring pulldown        X 10 2R      Reformer- stand hip ABD     X 15 2R  X 10 2R      Reformer ab rollouts      X 10 no springs       Reformer trunk rotations      X 10 2R       Rodrigue chair     X 5, instructed on proper positioning        Carlota pose with SB    X 15 seconds bilat         Thread the needle    X 15 seconds bilat         Physioball Bridge    X 10         Physioball ab rollouts   X 4, walk out X 10         Bosu ball squats   X 15 X 10         Hamstring contract relax PT assisted    3 x 5 seconds         Supine trunk rotations  X 10 B           Supine knee to chest  X 30\"/leg             Modifications instructed by therapist:     "

## 2024-11-03 ENCOUNTER — HEALTH MAINTENANCE LETTER (OUTPATIENT)
Age: 69
End: 2024-11-03

## 2024-12-29 ENCOUNTER — HEALTH MAINTENANCE LETTER (OUTPATIENT)
Age: 69
End: 2024-12-29

## 2025-02-22 NOTE — TELEPHONE ENCOUNTER
----- Message from Alise Douglas sent at 2023 11:30 PM CST -----  Regarding: CAM order expiring soon  Hello,    This patient is scheduled for prolia on 23.  Their med order  on 23. Please enter a new CAM order.     Thanks!  Alise Rubio     [facility]  HE Clinically Administered Medications          [address] [place] [id number]

## (undated) DEVICE — DAVINCI OBTURATOR 8MM BLADELESS 420023

## (undated) DEVICE — SOL NACL 0.9% IRRIG 1000ML BOTTLE 2F7124

## (undated) DEVICE — PEN MARKING SKIN W/LABELS 31145918

## (undated) DEVICE — SU VICRYL 3-0 SH 27" UND J416H

## (undated) DEVICE — LINEN GOWN X4 5410

## (undated) DEVICE — SUCTION TIP YANKAUER W/O VENT K86

## (undated) DEVICE — PAD CHUX UNDERPAD 30X36" P3036C

## (undated) DEVICE — SUCTION MANIFOLD DORNOCH ULTRA CART UL-CL500

## (undated) DEVICE — COVER CAMERA IN-LIGHT DISP LT-C02

## (undated) DEVICE — DRAPE UNDER BUTTOCK 8483

## (undated) DEVICE — DAVINCI S CANNULA SEAL 8MM 400077

## (undated) DEVICE — WIPES FOLEY CARE SURESTEP PROVON DFC100

## (undated) DEVICE — ESU CORD BIPOLAR GREEN 10-4000

## (undated) DEVICE — TUBING SUCTION MEDI-VAC 1/4"X20' N620A

## (undated) DEVICE — CATH TRAY FOLEY SURESTEP 16FR WDRAIN BAG STLK LATEX A300316A

## (undated) DEVICE — PREP CHLORAPREP 26ML TINTED ORANGE  260815

## (undated) DEVICE — GLOVE PROTEXIS BLUE W/NEU-THERA 7.5  2D73EB75

## (undated) DEVICE — JELLY LUBRICATING SURGILUBE 2OZ TUBE 0281-0205-02

## (undated) DEVICE — ENDO TROCAR FIRST ENTRY KII FIOS ADV FIX 05X100MM CFF03

## (undated) DEVICE — SU DERMABOND ADVANCED .7ML DNX12

## (undated) DEVICE — KIT PATIENT POSITIONING PIGAZZI LATEX FREE 40580

## (undated) DEVICE — SYR BULB IRRIG 50ML LATEX FREE 0035280

## (undated) DEVICE — STABILIZER ARCH KOH-EFFICIENT 2.5CM KC-ARCH-25

## (undated) DEVICE — PAD PERI INDIV WRAP 11" 2022A

## (undated) DEVICE — SOL WATER IRRIG 1000ML BOTTLE 2F7114

## (undated) DEVICE — SUCTION IRR STRYKERFLOW II W/TIP 250-070-520

## (undated) DEVICE — DRAPE IOBAN INCISE 23X17" 6650EZ

## (undated) DEVICE — ADAPTER DRAPE ALLY AU-AD

## (undated) DEVICE — GLOVE PROTEXIS W/NEU-THERA 7.0  2D73TE70

## (undated) DEVICE — SOL WATER IRRIG 3000ML BAG 2B7117

## (undated) DEVICE — SU VICRYL 0 CT-2 27" J334H

## (undated) DEVICE — DRAPE WARMER 66X44" ORS-300

## (undated) DEVICE — SYR 10ML LL W/O NDL 302995

## (undated) DEVICE — SYR 50ML LL W/O NDL 309653

## (undated) DEVICE — LINEN TOWEL PACK X5 5464

## (undated) DEVICE — DAVINCI HOT SHEARS TIP COVER  400180

## (undated) DEVICE — LINEN ORTHO PACK 5446

## (undated) DEVICE — TUBING IRRIG CYSTO/BLADDER SET 81" LF 2C4040

## (undated) DEVICE — SU MONOCRYL 4-0 PS-2 18" UND Y496G

## (undated) DEVICE — BARRIER INTERCEED 3X4" 4350

## (undated) DEVICE — ESU GROUND PAD UNIVERSAL W/O CORD

## (undated) DEVICE — DAVINCI S CANNULA SEAL 8.5-13MM 420206

## (undated) DEVICE — SU WND CLOSURE VLOC 180 ABS 0 12" GS-21 VLOCL0316

## (undated) DEVICE — Device

## (undated) DEVICE — SU VICRYL 0 UR-6 27" J603H

## (undated) DEVICE — SOL NACL 0.9% INJ 1000ML BAG 2B1324X

## (undated) DEVICE — UTERINE MANIPULATOR RUMI 6.7MMX8CM UMB678

## (undated) DEVICE — TUBING SMOKE EVAC PNEUVIEW 9660-XE

## (undated) RX ORDER — CEFAZOLIN SODIUM 1 G/3ML
INJECTION, POWDER, FOR SOLUTION INTRAMUSCULAR; INTRAVENOUS
Status: DISPENSED
Start: 2018-08-03

## (undated) RX ORDER — DEXAMETHASONE SODIUM PHOSPHATE 4 MG/ML
INJECTION, SOLUTION INTRA-ARTICULAR; INTRALESIONAL; INTRAMUSCULAR; INTRAVENOUS; SOFT TISSUE
Status: DISPENSED
Start: 2018-08-03

## (undated) RX ORDER — FENTANYL CITRATE 50 UG/ML
INJECTION, SOLUTION INTRAMUSCULAR; INTRAVENOUS
Status: DISPENSED
Start: 2018-08-03

## (undated) RX ORDER — PHENAZOPYRIDINE HYDROCHLORIDE 200 MG/1
TABLET, FILM COATED ORAL
Status: DISPENSED
Start: 2018-08-03

## (undated) RX ORDER — ACETAMINOPHEN 325 MG/1
TABLET ORAL
Status: DISPENSED
Start: 2018-08-03

## (undated) RX ORDER — CEFAZOLIN SODIUM 2 G/100ML
INJECTION, SOLUTION INTRAVENOUS
Status: DISPENSED
Start: 2018-08-03

## (undated) RX ORDER — PROPOFOL 10 MG/ML
INJECTION, EMULSION INTRAVENOUS
Status: DISPENSED
Start: 2018-08-03

## (undated) RX ORDER — SODIUM CHLORIDE, SODIUM LACTATE, POTASSIUM CHLORIDE, CALCIUM CHLORIDE 600; 310; 30; 20 MG/100ML; MG/100ML; MG/100ML; MG/100ML
INJECTION, SOLUTION INTRAVENOUS
Status: DISPENSED
Start: 2018-08-03

## (undated) RX ORDER — ONDANSETRON 2 MG/ML
INJECTION INTRAMUSCULAR; INTRAVENOUS
Status: DISPENSED
Start: 2018-08-03

## (undated) RX ORDER — LIDOCAINE HYDROCHLORIDE 20 MG/ML
INJECTION, SOLUTION EPIDURAL; INFILTRATION; INTRACAUDAL; PERINEURAL
Status: DISPENSED
Start: 2018-08-03

## (undated) RX ORDER — HYDROMORPHONE HYDROCHLORIDE 1 MG/ML
INJECTION, SOLUTION INTRAMUSCULAR; INTRAVENOUS; SUBCUTANEOUS
Status: DISPENSED
Start: 2018-08-03